# Patient Record
Sex: FEMALE | Race: WHITE | NOT HISPANIC OR LATINO
[De-identification: names, ages, dates, MRNs, and addresses within clinical notes are randomized per-mention and may not be internally consistent; named-entity substitution may affect disease eponyms.]

---

## 2017-01-27 ENCOUNTER — APPOINTMENT (OUTPATIENT)
Dept: PULMONOLOGY | Facility: CLINIC | Age: 67
End: 2017-01-27

## 2017-01-27 VITALS
WEIGHT: 261 LBS | BODY MASS INDEX: 46.25 KG/M2 | RESPIRATION RATE: 17 BRPM | HEART RATE: 82 BPM | DIASTOLIC BLOOD PRESSURE: 80 MMHG | SYSTOLIC BLOOD PRESSURE: 130 MMHG | OXYGEN SATURATION: 95 % | HEIGHT: 63 IN

## 2017-01-27 DIAGNOSIS — K44.9 DIAPHRAGMATIC HERNIA W/OUT OBSTRUCTION OR GANGRENE: ICD-10-CM

## 2017-05-05 ENCOUNTER — APPOINTMENT (OUTPATIENT)
Dept: CT IMAGING | Facility: CLINIC | Age: 67
End: 2017-05-05

## 2017-05-05 ENCOUNTER — OUTPATIENT (OUTPATIENT)
Dept: OUTPATIENT SERVICES | Facility: HOSPITAL | Age: 67
LOS: 1 days | End: 2017-05-05
Payer: COMMERCIAL

## 2017-05-05 DIAGNOSIS — Z98.89 OTHER SPECIFIED POSTPROCEDURAL STATES: Chronic | ICD-10-CM

## 2017-05-05 DIAGNOSIS — Z00.8 ENCOUNTER FOR OTHER GENERAL EXAMINATION: ICD-10-CM

## 2017-05-05 PROCEDURE — 70450 CT HEAD/BRAIN W/O DYE: CPT

## 2017-07-13 ENCOUNTER — APPOINTMENT (OUTPATIENT)
Dept: PULMONOLOGY | Facility: CLINIC | Age: 67
End: 2017-07-13

## 2017-07-13 VITALS
RESPIRATION RATE: 15 BRPM | OXYGEN SATURATION: 93 % | HEIGHT: 63 IN | BODY MASS INDEX: 39.69 KG/M2 | HEART RATE: 65 BPM | SYSTOLIC BLOOD PRESSURE: 127 MMHG | WEIGHT: 224 LBS | DIASTOLIC BLOOD PRESSURE: 80 MMHG

## 2018-01-16 ENCOUNTER — APPOINTMENT (OUTPATIENT)
Dept: PULMONOLOGY | Facility: CLINIC | Age: 68
End: 2018-01-16
Payer: COMMERCIAL

## 2018-01-16 VITALS
DIASTOLIC BLOOD PRESSURE: 70 MMHG | HEART RATE: 70 BPM | SYSTOLIC BLOOD PRESSURE: 110 MMHG | OXYGEN SATURATION: 100 % | WEIGHT: 200 LBS | BODY MASS INDEX: 35.44 KG/M2 | RESPIRATION RATE: 17 BRPM | HEIGHT: 63 IN

## 2018-01-16 PROCEDURE — 99214 OFFICE O/P EST MOD 30 MIN: CPT | Mod: 25

## 2018-01-16 PROCEDURE — 94010 BREATHING CAPACITY TEST: CPT

## 2018-05-18 ENCOUNTER — APPOINTMENT (OUTPATIENT)
Dept: MRI IMAGING | Facility: CLINIC | Age: 68
End: 2018-05-18
Payer: COMMERCIAL

## 2018-05-18 ENCOUNTER — OUTPATIENT (OUTPATIENT)
Dept: OUTPATIENT SERVICES | Facility: HOSPITAL | Age: 68
LOS: 1 days | End: 2018-05-18
Payer: COMMERCIAL

## 2018-05-18 DIAGNOSIS — Z98.89 OTHER SPECIFIED POSTPROCEDURAL STATES: Chronic | ICD-10-CM

## 2018-05-18 DIAGNOSIS — Z00.8 ENCOUNTER FOR OTHER GENERAL EXAMINATION: ICD-10-CM

## 2018-05-18 PROCEDURE — A9585: CPT

## 2018-05-18 PROCEDURE — C8908: CPT

## 2018-05-18 PROCEDURE — 0159T: CPT | Mod: 26

## 2018-05-18 PROCEDURE — 82565 ASSAY OF CREATININE: CPT

## 2018-05-18 PROCEDURE — C8937: CPT

## 2018-05-18 PROCEDURE — 77059 MRI BREAST BILATERAL: CPT | Mod: 26

## 2018-05-22 ENCOUNTER — RESULT REVIEW (OUTPATIENT)
Age: 68
End: 2018-05-22

## 2018-05-22 ENCOUNTER — OUTPATIENT (OUTPATIENT)
Dept: OUTPATIENT SERVICES | Facility: HOSPITAL | Age: 68
LOS: 1 days | End: 2018-05-22
Payer: COMMERCIAL

## 2018-05-22 DIAGNOSIS — Z98.89 OTHER SPECIFIED POSTPROCEDURAL STATES: Chronic | ICD-10-CM

## 2018-05-22 DIAGNOSIS — D05.12 INTRADUCTAL CARCINOMA IN SITU OF LEFT BREAST: ICD-10-CM

## 2018-05-22 PROCEDURE — 88321 CONSLTJ&REPRT SLD PREP ELSWR: CPT

## 2018-05-23 LAB — SURGICAL PATHOLOGY STUDY: SIGNIFICANT CHANGE UP

## 2018-05-31 ENCOUNTER — TRANSCRIPTION ENCOUNTER (OUTPATIENT)
Age: 68
End: 2018-05-31

## 2018-06-06 ENCOUNTER — OUTPATIENT (OUTPATIENT)
Dept: OUTPATIENT SERVICES | Facility: HOSPITAL | Age: 68
LOS: 1 days | End: 2018-06-06
Payer: COMMERCIAL

## 2018-06-06 ENCOUNTER — APPOINTMENT (OUTPATIENT)
Dept: ULTRASOUND IMAGING | Facility: IMAGING CENTER | Age: 68
End: 2018-06-06
Payer: COMMERCIAL

## 2018-06-06 DIAGNOSIS — R92.8 OTHER ABNORMAL AND INCONCLUSIVE FINDINGS ON DIAGNOSTIC IMAGING OF BREAST: ICD-10-CM

## 2018-06-06 DIAGNOSIS — Z98.89 OTHER SPECIFIED POSTPROCEDURAL STATES: Chronic | ICD-10-CM

## 2018-06-06 PROCEDURE — 76642 ULTRASOUND BREAST LIMITED: CPT | Mod: 26,LT

## 2018-06-06 PROCEDURE — C1739: CPT

## 2018-06-06 PROCEDURE — 19285 PERQ DEV BREAST 1ST US IMAG: CPT | Mod: LT

## 2018-06-06 PROCEDURE — 76642 ULTRASOUND BREAST LIMITED: CPT

## 2018-06-06 PROCEDURE — 19285 PERQ DEV BREAST 1ST US IMAG: CPT

## 2018-06-12 ENCOUNTER — RESULT REVIEW (OUTPATIENT)
Age: 68
End: 2018-06-12

## 2018-06-12 ENCOUNTER — APPOINTMENT (OUTPATIENT)
Dept: MAMMOGRAPHY | Facility: IMAGING CENTER | Age: 68
End: 2018-06-12
Payer: COMMERCIAL

## 2018-06-12 ENCOUNTER — APPOINTMENT (OUTPATIENT)
Dept: MRI IMAGING | Facility: IMAGING CENTER | Age: 68
End: 2018-06-12
Payer: COMMERCIAL

## 2018-06-12 ENCOUNTER — OUTPATIENT (OUTPATIENT)
Dept: OUTPATIENT SERVICES | Facility: HOSPITAL | Age: 68
LOS: 1 days | End: 2018-06-12
Payer: COMMERCIAL

## 2018-06-12 DIAGNOSIS — Z98.89 OTHER SPECIFIED POSTPROCEDURAL STATES: Chronic | ICD-10-CM

## 2018-06-12 DIAGNOSIS — R92.8 OTHER ABNORMAL AND INCONCLUSIVE FINDINGS ON DIAGNOSTIC IMAGING OF BREAST: ICD-10-CM

## 2018-06-12 PROCEDURE — 88305 TISSUE EXAM BY PATHOLOGIST: CPT

## 2018-06-12 PROCEDURE — 88305 TISSUE EXAM BY PATHOLOGIST: CPT | Mod: 26

## 2018-06-12 PROCEDURE — 77065 DX MAMMO INCL CAD UNI: CPT

## 2018-06-12 PROCEDURE — C1739: CPT

## 2018-06-12 PROCEDURE — 19085 BX BREAST 1ST LESION MR IMAG: CPT | Mod: LT

## 2018-06-12 PROCEDURE — 77065 DX MAMMO INCL CAD UNI: CPT | Mod: 26,LT

## 2018-06-12 PROCEDURE — 88360 TUMOR IMMUNOHISTOCHEM/MANUAL: CPT | Mod: 26

## 2018-06-12 PROCEDURE — 19281 PERQ DEVICE BREAST 1ST IMAG: CPT | Mod: LT,59

## 2018-06-12 PROCEDURE — A9585: CPT

## 2018-06-12 PROCEDURE — 88360 TUMOR IMMUNOHISTOCHEM/MANUAL: CPT

## 2018-06-12 PROCEDURE — 19085 BX BREAST 1ST LESION MR IMAG: CPT

## 2018-06-12 PROCEDURE — 19281 PERQ DEVICE BREAST 1ST IMAG: CPT

## 2018-06-20 ENCOUNTER — OUTPATIENT (OUTPATIENT)
Dept: OUTPATIENT SERVICES | Facility: HOSPITAL | Age: 68
LOS: 1 days | Discharge: HOME | End: 2018-06-20
Payer: COMMERCIAL

## 2018-06-20 DIAGNOSIS — Z98.89 OTHER SPECIFIED POSTPROCEDURAL STATES: Chronic | ICD-10-CM

## 2018-06-20 DIAGNOSIS — C50.919 MALIGNANT NEOPLASM OF UNSPECIFIED SITE OF UNSPECIFIED FEMALE BREAST: ICD-10-CM

## 2018-06-21 ENCOUNTER — LABORATORY RESULT (OUTPATIENT)
Age: 68
End: 2018-06-21

## 2018-06-21 ENCOUNTER — RESULT REVIEW (OUTPATIENT)
Age: 68
End: 2018-06-21

## 2018-06-21 ENCOUNTER — APPOINTMENT (OUTPATIENT)
Dept: HEMATOLOGY ONCOLOGY | Facility: CLINIC | Age: 68
End: 2018-06-21
Payer: COMMERCIAL

## 2018-06-21 VITALS
DIASTOLIC BLOOD PRESSURE: 81 MMHG | HEIGHT: 61.61 IN | RESPIRATION RATE: 16 BRPM | WEIGHT: 200.62 LBS | HEART RATE: 68 BPM | SYSTOLIC BLOOD PRESSURE: 133 MMHG | BODY MASS INDEX: 37.39 KG/M2 | OXYGEN SATURATION: 100 % | TEMPERATURE: 98.1 F

## 2018-06-21 LAB
HCT VFR BLD CALC: 40 % — SIGNIFICANT CHANGE UP (ref 34.5–45)
HGB BLD-MCNC: 13.9 G/DL — SIGNIFICANT CHANGE UP (ref 11.5–15.5)
MCHC RBC-ENTMCNC: 31.8 PG — SIGNIFICANT CHANGE UP (ref 27–34)
MCHC RBC-ENTMCNC: 34.7 G/DL — SIGNIFICANT CHANGE UP (ref 32–36)
MCV RBC AUTO: 91.4 FL — SIGNIFICANT CHANGE UP (ref 80–100)
PLATELET # BLD AUTO: 259 K/UL — SIGNIFICANT CHANGE UP (ref 150–400)
RBC # BLD: 4.37 M/UL — SIGNIFICANT CHANGE UP (ref 3.8–5.2)
RBC # FLD: 12 % — SIGNIFICANT CHANGE UP (ref 10.3–14.5)
WBC # BLD: 6 K/UL — SIGNIFICANT CHANGE UP (ref 3.8–10.5)
WBC # FLD AUTO: 6 K/UL — SIGNIFICANT CHANGE UP (ref 3.8–10.5)

## 2018-06-21 PROCEDURE — 99245 OFF/OP CONSLTJ NEW/EST HI 55: CPT

## 2018-06-27 ENCOUNTER — OUTPATIENT (OUTPATIENT)
Dept: OUTPATIENT SERVICES | Facility: HOSPITAL | Age: 68
LOS: 1 days | End: 2018-06-27
Payer: COMMERCIAL

## 2018-06-27 ENCOUNTER — APPOINTMENT (OUTPATIENT)
Dept: CV DIAGNOSITCS | Facility: HOSPITAL | Age: 68
End: 2018-06-27

## 2018-06-27 DIAGNOSIS — I25.10 ATHEROSCLEROTIC HEART DISEASE OF NATIVE CORONARY ARTERY WITHOUT ANGINA PECTORIS: ICD-10-CM

## 2018-06-27 DIAGNOSIS — Z98.89 OTHER SPECIFIED POSTPROCEDURAL STATES: Chronic | ICD-10-CM

## 2018-06-27 PROCEDURE — 93306 TTE W/DOPPLER COMPLETE: CPT | Mod: 26

## 2018-06-27 PROCEDURE — 93306 TTE W/DOPPLER COMPLETE: CPT

## 2018-06-27 PROCEDURE — 0399T: CPT

## 2018-06-28 ENCOUNTER — MED ADMIN CHARGE (OUTPATIENT)
Age: 68
End: 2018-06-28

## 2018-07-03 ENCOUNTER — OUTPATIENT (OUTPATIENT)
Dept: OUTPATIENT SERVICES | Facility: HOSPITAL | Age: 68
LOS: 1 days | End: 2018-07-03
Payer: COMMERCIAL

## 2018-07-03 VITALS
TEMPERATURE: 98 F | WEIGHT: 199.96 LBS | HEIGHT: 62 IN | OXYGEN SATURATION: 97 % | DIASTOLIC BLOOD PRESSURE: 78 MMHG | RESPIRATION RATE: 15 BRPM | SYSTOLIC BLOOD PRESSURE: 126 MMHG | HEART RATE: 68 BPM

## 2018-07-03 DIAGNOSIS — Z98.89 OTHER SPECIFIED POSTPROCEDURAL STATES: Chronic | ICD-10-CM

## 2018-07-03 DIAGNOSIS — C50.919 MALIGNANT NEOPLASM OF UNSPECIFIED SITE OF UNSPECIFIED FEMALE BREAST: ICD-10-CM

## 2018-07-03 DIAGNOSIS — Z90.49 ACQUIRED ABSENCE OF OTHER SPECIFIED PARTS OF DIGESTIVE TRACT: Chronic | ICD-10-CM

## 2018-07-03 DIAGNOSIS — Z90.3 ACQUIRED ABSENCE OF STOMACH [PART OF]: Chronic | ICD-10-CM

## 2018-07-03 DIAGNOSIS — Z98.890 OTHER SPECIFIED POSTPROCEDURAL STATES: Chronic | ICD-10-CM

## 2018-07-03 DIAGNOSIS — Z01.818 ENCOUNTER FOR OTHER PREPROCEDURAL EXAMINATION: ICD-10-CM

## 2018-07-03 RX ORDER — LIDOCAINE HCL 20 MG/ML
0.2 VIAL (ML) INJECTION ONCE
Qty: 0 | Refills: 0 | Status: DISCONTINUED | OUTPATIENT
Start: 2018-07-11 | End: 2018-07-26

## 2018-07-03 RX ORDER — SODIUM CHLORIDE 9 MG/ML
3 INJECTION INTRAMUSCULAR; INTRAVENOUS; SUBCUTANEOUS EVERY 8 HOURS
Qty: 0 | Refills: 0 | Status: DISCONTINUED | OUTPATIENT
Start: 2018-07-11 | End: 2018-07-26

## 2018-07-03 RX ORDER — ACETAMINOPHEN 500 MG
1000 TABLET ORAL ONCE
Qty: 0 | Refills: 0 | Status: COMPLETED | OUTPATIENT
Start: 2018-07-11 | End: 2018-07-11

## 2018-07-03 NOTE — H&P PST ADULT - HISTORY OF PRESENT ILLNESS
68 y/o female with PMHx of 66 y/o female with PMHx of obesity (s/p gastric sleeve 2014), GERD (diet controlled) had left breast pain and while palpating her breast felt a lump. s/p left breast biopsy 4/2018 revealed a malignant neoplasm per pt. Presents to PST for a scheduled Mediport insertion on 7/11/2018. 68 y/o female with PMHx of obesity (s/p gastric sleeve 2014), GERD (diet controlled) had left breast pain, while palpating her breast she felt a lump. s/p left breast biopsy 4/2018 revealed a malignant neoplasm per pt. Presents to PST for a scheduled Mediport insertion on 7/11/2018.

## 2018-07-03 NOTE — H&P PST ADULT - PMH
Esophagitis    GERD (gastroesophageal reflux disease)    Hiatal hernia    Migraine    Morbid obesity with BMI of 40.0-44.9, adult    MVP (mitral valve prolapse)  recent echo 5/14/14  Shingles outbreak  left orbital area 5/7/14 Esophagitis    GERD (gastroesophageal reflux disease)    Hiatal hernia    Malignant neoplasm of breast    Migraine    Morbid obesity with BMI of 40.0-44.9, adult    MVP (mitral valve prolapse)  recent echo 5/14/14  Shingles outbreak  left orbital area 5/7/14 Esophagitis    GERD (gastroesophageal reflux disease)  diet-controlled  Hiatal hernia    Malignant neoplasm of breast  left, diagnosed 4/2018  Migraine    Morbid obesity with BMI of 40.0-44.9, adult  s/p sleeve gastrectomy 2014  MVP (mitral valve prolapse)  recent echo 6/27/18  Shingles outbreak  left orbital area 5/7/14

## 2018-07-03 NOTE — H&P PST ADULT - PROBLEM SELECTOR PLAN 1
Scheduled mediport insertion on 7/11/2018  CBC reviewed and in [facility]  Pre-op instructions given to pt including chlorhexidine soap

## 2018-07-03 NOTE — H&P PST ADULT - NSANTHOSAYNRD_GEN_A_CORE
No. XOCHITL screening performed.  STOP BANG Legend: 0-2 = LOW Risk; 3-4 = INTERMEDIATE Risk; 5-8 = HIGH Risk

## 2018-07-03 NOTE — H&P PST ADULT - OTHER CARE PROVIDERS
nahomi campoverde   Lorne Tan  ([facility]) [phone]//Андрей Rashid ([facility]) [phone]

## 2018-07-03 NOTE — H&P PST ADULT - ACTIVITY
walks 0.5 miles 3x week walks 0.5 miles 3x week, stationary bicycle, grocery shopping, climbs steps daily, able to participate in strenuous activity walks 3x week, stationary bicycle, grocery shopping, climbs steps daily, able to participate in strenuous activity

## 2018-07-03 NOTE — H&P PST ADULT - PSH
H/O hernia repair  2014  S/P appendectomy    S/P   x3 H/O hernia repair  Tamiko, Cristal  History of sleeve gastrectomy  2014  S/P appendectomy  1967  S/P   x3 (, , )  S/P laparoscopic cholecystectomy  3/8/18

## 2018-07-10 ENCOUNTER — TRANSCRIPTION ENCOUNTER (OUTPATIENT)
Age: 68
End: 2018-07-10

## 2018-07-11 ENCOUNTER — OUTPATIENT (OUTPATIENT)
Dept: OUTPATIENT SERVICES | Facility: HOSPITAL | Age: 68
LOS: 1 days | End: 2018-07-11
Payer: COMMERCIAL

## 2018-07-11 VITALS
OXYGEN SATURATION: 96 % | DIASTOLIC BLOOD PRESSURE: 60 MMHG | SYSTOLIC BLOOD PRESSURE: 125 MMHG | TEMPERATURE: 98 F | RESPIRATION RATE: 16 BRPM | HEART RATE: 56 BPM

## 2018-07-11 VITALS
OXYGEN SATURATION: 96 % | HEIGHT: 62 IN | WEIGHT: 199.96 LBS | DIASTOLIC BLOOD PRESSURE: 83 MMHG | HEART RATE: 69 BPM | SYSTOLIC BLOOD PRESSURE: 155 MMHG | TEMPERATURE: 98 F | RESPIRATION RATE: 18 BRPM

## 2018-07-11 DIAGNOSIS — Z98.890 OTHER SPECIFIED POSTPROCEDURAL STATES: Chronic | ICD-10-CM

## 2018-07-11 DIAGNOSIS — Z98.89 OTHER SPECIFIED POSTPROCEDURAL STATES: Chronic | ICD-10-CM

## 2018-07-11 DIAGNOSIS — Z90.49 ACQUIRED ABSENCE OF OTHER SPECIFIED PARTS OF DIGESTIVE TRACT: Chronic | ICD-10-CM

## 2018-07-11 DIAGNOSIS — Z90.3 ACQUIRED ABSENCE OF STOMACH [PART OF]: Chronic | ICD-10-CM

## 2018-07-11 DIAGNOSIS — C50.919 MALIGNANT NEOPLASM OF UNSPECIFIED SITE OF UNSPECIFIED FEMALE BREAST: ICD-10-CM

## 2018-07-11 PROCEDURE — G0463: CPT

## 2018-07-11 PROCEDURE — C1788: CPT

## 2018-07-11 PROCEDURE — 71045 X-RAY EXAM CHEST 1 VIEW: CPT

## 2018-07-11 PROCEDURE — 71045 X-RAY EXAM CHEST 1 VIEW: CPT | Mod: 26

## 2018-07-11 PROCEDURE — 36561 INSERT TUNNELED CV CATH: CPT

## 2018-07-11 PROCEDURE — 76000 FLUOROSCOPY <1 HR PHYS/QHP: CPT

## 2018-07-11 RX ORDER — CELECOXIB 200 MG/1
200 CAPSULE ORAL ONCE
Qty: 0 | Refills: 0 | Status: DISCONTINUED | OUTPATIENT
Start: 2018-07-11 | End: 2018-07-26

## 2018-07-11 RX ORDER — FAMOTIDINE 10 MG/ML
20 INJECTION INTRAVENOUS ONCE
Qty: 0 | Refills: 0 | Status: COMPLETED | OUTPATIENT
Start: 2018-07-11 | End: 2018-07-11

## 2018-07-11 RX ORDER — CELECOXIB 200 MG/1
200 CAPSULE ORAL ONCE
Qty: 0 | Refills: 0 | Status: COMPLETED | OUTPATIENT
Start: 2018-07-11 | End: 2018-07-11

## 2018-07-11 RX ORDER — SODIUM CHLORIDE 9 MG/ML
1000 INJECTION, SOLUTION INTRAVENOUS
Qty: 0 | Refills: 0 | Status: DISCONTINUED | OUTPATIENT
Start: 2018-07-11 | End: 2018-07-26

## 2018-07-11 RX ORDER — OXYCODONE HYDROCHLORIDE 5 MG/1
10 TABLET ORAL ONCE
Qty: 0 | Refills: 0 | Status: DISCONTINUED | OUTPATIENT
Start: 2018-07-11 | End: 2018-07-11

## 2018-07-11 RX ORDER — ONDANSETRON 8 MG/1
4 TABLET, FILM COATED ORAL ONCE
Qty: 0 | Refills: 0 | Status: DISCONTINUED | OUTPATIENT
Start: 2018-07-11 | End: 2018-07-26

## 2018-07-11 RX ORDER — OXYCODONE HYDROCHLORIDE 5 MG/1
5 TABLET ORAL ONCE
Qty: 0 | Refills: 0 | Status: DISCONTINUED | OUTPATIENT
Start: 2018-07-11 | End: 2018-07-11

## 2018-07-11 RX ADMIN — CELECOXIB 200 MILLIGRAM(S): 200 CAPSULE ORAL at 11:00

## 2018-07-11 RX ADMIN — Medication 1000 MILLIGRAM(S): at 11:00

## 2018-07-11 RX ADMIN — FAMOTIDINE 20 MILLIGRAM(S): 10 INJECTION INTRAVENOUS at 13:26

## 2018-07-11 NOTE — ASU PATIENT PROFILE, ADULT - PMH
Esophagitis    GERD (gastroesophageal reflux disease)  diet-controlled  Hiatal hernia    Malignant neoplasm of breast  left, diagnosed 4/2018  Migraine    Morbid obesity with BMI of 40.0-44.9, adult  s/p sleeve gastrectomy 2014  MVP (mitral valve prolapse)  recent echo 6/27/18  Shingles outbreak  left orbital area 5/7/14

## 2018-07-12 ENCOUNTER — APPOINTMENT (OUTPATIENT)
Dept: INFUSION THERAPY | Facility: HOSPITAL | Age: 68
End: 2018-07-12

## 2018-07-12 ENCOUNTER — RESULT REVIEW (OUTPATIENT)
Age: 68
End: 2018-07-12

## 2018-07-12 ENCOUNTER — APPOINTMENT (OUTPATIENT)
Dept: HEMATOLOGY ONCOLOGY | Facility: CLINIC | Age: 68
End: 2018-07-12
Payer: COMMERCIAL

## 2018-07-12 LAB
HCT VFR BLD CALC: 37.7 % — SIGNIFICANT CHANGE UP (ref 34.5–45)
HGB BLD-MCNC: 12.6 G/DL — SIGNIFICANT CHANGE UP (ref 11.5–15.5)
MCHC RBC-ENTMCNC: 30.3 PG — SIGNIFICANT CHANGE UP (ref 27–34)
MCHC RBC-ENTMCNC: 33.5 G/DL — SIGNIFICANT CHANGE UP (ref 32–36)
MCV RBC AUTO: 90.4 FL — SIGNIFICANT CHANGE UP (ref 80–100)
PLATELET # BLD AUTO: 266 K/UL — SIGNIFICANT CHANGE UP (ref 150–400)
RBC # BLD: 4.17 M/UL — SIGNIFICANT CHANGE UP (ref 3.8–5.2)
RBC # FLD: 11.9 % — SIGNIFICANT CHANGE UP (ref 10.3–14.5)
WBC # BLD: 11.6 K/UL — HIGH (ref 3.8–10.5)
WBC # FLD AUTO: 11.6 K/UL — HIGH (ref 3.8–10.5)

## 2018-07-12 PROCEDURE — 99213 OFFICE O/P EST LOW 20 MIN: CPT

## 2018-07-12 PROCEDURE — 93010 ELECTROCARDIOGRAM REPORT: CPT

## 2018-07-13 DIAGNOSIS — Z51.11 ENCOUNTER FOR ANTINEOPLASTIC CHEMOTHERAPY: ICD-10-CM

## 2018-07-13 DIAGNOSIS — Z51.89 ENCOUNTER FOR OTHER SPECIFIED AFTERCARE: ICD-10-CM

## 2018-07-13 DIAGNOSIS — R11.2 NAUSEA WITH VOMITING, UNSPECIFIED: ICD-10-CM

## 2018-07-16 ENCOUNTER — NON-APPOINTMENT (OUTPATIENT)
Age: 68
End: 2018-07-16

## 2018-07-16 ENCOUNTER — APPOINTMENT (OUTPATIENT)
Dept: PULMONOLOGY | Facility: CLINIC | Age: 68
End: 2018-07-16
Payer: COMMERCIAL

## 2018-07-16 VITALS
HEIGHT: 62 IN | BODY MASS INDEX: 36.8 KG/M2 | DIASTOLIC BLOOD PRESSURE: 70 MMHG | SYSTOLIC BLOOD PRESSURE: 120 MMHG | WEIGHT: 200 LBS | OXYGEN SATURATION: 96 % | RESPIRATION RATE: 17 BRPM | HEART RATE: 59 BPM

## 2018-07-16 PROCEDURE — 99214 OFFICE O/P EST MOD 30 MIN: CPT | Mod: 25

## 2018-07-16 PROCEDURE — 94010 BREATHING CAPACITY TEST: CPT

## 2018-07-17 PROBLEM — C50.919 MALIGNANT NEOPLASM OF UNSPECIFIED SITE OF UNSPECIFIED FEMALE BREAST: Chronic | Status: ACTIVE | Noted: 2018-07-03

## 2018-07-19 ENCOUNTER — OUTPATIENT (OUTPATIENT)
Dept: OUTPATIENT SERVICES | Facility: HOSPITAL | Age: 68
LOS: 1 days | Discharge: HOME | End: 2018-07-19

## 2018-07-19 ENCOUNTER — FORM ENCOUNTER (OUTPATIENT)
Age: 68
End: 2018-07-19

## 2018-07-19 DIAGNOSIS — C50.919 MALIGNANT NEOPLASM OF UNSPECIFIED SITE OF UNSPECIFIED FEMALE BREAST: ICD-10-CM

## 2018-07-19 DIAGNOSIS — Z98.89 OTHER SPECIFIED POSTPROCEDURAL STATES: Chronic | ICD-10-CM

## 2018-07-19 DIAGNOSIS — Z98.890 OTHER SPECIFIED POSTPROCEDURAL STATES: Chronic | ICD-10-CM

## 2018-07-19 DIAGNOSIS — Z90.3 ACQUIRED ABSENCE OF STOMACH [PART OF]: Chronic | ICD-10-CM

## 2018-07-19 DIAGNOSIS — Z90.49 ACQUIRED ABSENCE OF OTHER SPECIFIED PARTS OF DIGESTIVE TRACT: Chronic | ICD-10-CM

## 2018-07-20 ENCOUNTER — OUTPATIENT (OUTPATIENT)
Dept: OUTPATIENT SERVICES | Facility: HOSPITAL | Age: 68
LOS: 1 days | End: 2018-07-20
Payer: COMMERCIAL

## 2018-07-20 ENCOUNTER — APPOINTMENT (OUTPATIENT)
Dept: ULTRASOUND IMAGING | Facility: IMAGING CENTER | Age: 68
End: 2018-07-20
Payer: COMMERCIAL

## 2018-07-20 DIAGNOSIS — C50.912 MALIGNANT NEOPLASM OF UNSPECIFIED SITE OF LEFT FEMALE BREAST: ICD-10-CM

## 2018-07-20 DIAGNOSIS — Z90.3 ACQUIRED ABSENCE OF STOMACH [PART OF]: Chronic | ICD-10-CM

## 2018-07-20 DIAGNOSIS — Z90.49 ACQUIRED ABSENCE OF OTHER SPECIFIED PARTS OF DIGESTIVE TRACT: Chronic | ICD-10-CM

## 2018-07-20 DIAGNOSIS — Z98.890 OTHER SPECIFIED POSTPROCEDURAL STATES: Chronic | ICD-10-CM

## 2018-07-20 DIAGNOSIS — Z98.89 OTHER SPECIFIED POSTPROCEDURAL STATES: Chronic | ICD-10-CM

## 2018-07-20 PROCEDURE — 76641 ULTRASOUND BREAST COMPLETE: CPT

## 2018-07-20 PROCEDURE — 76641 ULTRASOUND BREAST COMPLETE: CPT | Mod: 26,LT

## 2018-07-26 ENCOUNTER — APPOINTMENT (OUTPATIENT)
Dept: HEMATOLOGY ONCOLOGY | Facility: CLINIC | Age: 68
End: 2018-07-26
Payer: COMMERCIAL

## 2018-07-26 ENCOUNTER — APPOINTMENT (OUTPATIENT)
Dept: INFUSION THERAPY | Facility: HOSPITAL | Age: 68
End: 2018-07-26

## 2018-07-26 ENCOUNTER — RESULT REVIEW (OUTPATIENT)
Age: 68
End: 2018-07-26

## 2018-07-26 ENCOUNTER — LABORATORY RESULT (OUTPATIENT)
Age: 68
End: 2018-07-26

## 2018-07-26 VITALS
WEIGHT: 201.72 LBS | RESPIRATION RATE: 16 BRPM | SYSTOLIC BLOOD PRESSURE: 120 MMHG | DIASTOLIC BLOOD PRESSURE: 72 MMHG | HEART RATE: 74 BPM | TEMPERATURE: 98.2 F | OXYGEN SATURATION: 98 % | BODY MASS INDEX: 36.9 KG/M2

## 2018-07-26 DIAGNOSIS — K59.00 CONSTIPATION, UNSPECIFIED: ICD-10-CM

## 2018-07-26 LAB
HCT VFR BLD CALC: 36.4 % — SIGNIFICANT CHANGE UP (ref 34.5–45)
HGB BLD-MCNC: 12.2 G/DL — SIGNIFICANT CHANGE UP (ref 11.5–15.5)
MCHC RBC-ENTMCNC: 30.9 PG — SIGNIFICANT CHANGE UP (ref 27–34)
MCHC RBC-ENTMCNC: 33.6 G/DL — SIGNIFICANT CHANGE UP (ref 32–36)
MCV RBC AUTO: 91.9 FL — SIGNIFICANT CHANGE UP (ref 80–100)
PLATELET # BLD AUTO: 230 K/UL — SIGNIFICANT CHANGE UP (ref 150–400)
RBC # BLD: 3.96 M/UL — SIGNIFICANT CHANGE UP (ref 3.8–5.2)
RBC # FLD: 12.3 % — SIGNIFICANT CHANGE UP (ref 10.3–14.5)
WBC # BLD: 10.8 K/UL — HIGH (ref 3.8–10.5)
WBC # FLD AUTO: 10.8 K/UL — HIGH (ref 3.8–10.5)

## 2018-07-26 PROCEDURE — 99214 OFFICE O/P EST MOD 30 MIN: CPT

## 2018-07-27 DIAGNOSIS — Z51.11 ENCOUNTER FOR ANTINEOPLASTIC CHEMOTHERAPY: ICD-10-CM

## 2018-07-27 DIAGNOSIS — Z51.89 ENCOUNTER FOR OTHER SPECIFIED AFTERCARE: ICD-10-CM

## 2018-07-27 DIAGNOSIS — R11.2 NAUSEA WITH VOMITING, UNSPECIFIED: ICD-10-CM

## 2018-08-09 ENCOUNTER — LABORATORY RESULT (OUTPATIENT)
Age: 68
End: 2018-08-09

## 2018-08-09 ENCOUNTER — APPOINTMENT (OUTPATIENT)
Dept: HEMATOLOGY ONCOLOGY | Facility: CLINIC | Age: 68
End: 2018-08-09
Payer: COMMERCIAL

## 2018-08-09 ENCOUNTER — APPOINTMENT (OUTPATIENT)
Dept: INFUSION THERAPY | Facility: HOSPITAL | Age: 68
End: 2018-08-09

## 2018-08-09 ENCOUNTER — RESULT REVIEW (OUTPATIENT)
Age: 68
End: 2018-08-09

## 2018-08-09 VITALS
OXYGEN SATURATION: 96 % | DIASTOLIC BLOOD PRESSURE: 74 MMHG | RESPIRATION RATE: 16 BRPM | WEIGHT: 203.93 LBS | BODY MASS INDEX: 37.3 KG/M2 | HEART RATE: 80 BPM | SYSTOLIC BLOOD PRESSURE: 120 MMHG | TEMPERATURE: 97.8 F

## 2018-08-09 LAB
HCT VFR BLD CALC: 32.8 % — LOW (ref 34.5–45)
HGB BLD-MCNC: 10.8 G/DL — LOW (ref 11.5–15.5)
MCHC RBC-ENTMCNC: 30.1 PG — SIGNIFICANT CHANGE UP (ref 27–34)
MCHC RBC-ENTMCNC: 33.1 G/DL — SIGNIFICANT CHANGE UP (ref 32–36)
MCV RBC AUTO: 90.9 FL — SIGNIFICANT CHANGE UP (ref 80–100)
PLATELET # BLD AUTO: 382 K/UL — SIGNIFICANT CHANGE UP (ref 150–400)
RBC # BLD: 3.6 M/UL — LOW (ref 3.8–5.2)
RBC # FLD: 12.6 % — SIGNIFICANT CHANGE UP (ref 10.3–14.5)
WBC # BLD: 10.3 K/UL — SIGNIFICANT CHANGE UP (ref 3.8–10.5)
WBC # FLD AUTO: 10.3 K/UL — SIGNIFICANT CHANGE UP (ref 3.8–10.5)

## 2018-08-09 PROCEDURE — 99215 OFFICE O/P EST HI 40 MIN: CPT

## 2018-08-09 RX ORDER — AMOXICILLIN 500 MG/1
500 CAPSULE ORAL
Qty: 21 | Refills: 0 | Status: DISCONTINUED | COMMUNITY
Start: 2018-08-02

## 2018-08-09 RX ORDER — OXYCODONE AND ACETAMINOPHEN 5; 325 MG/1; MG/1
5-325 TABLET ORAL
Qty: 14 | Refills: 0 | Status: DISCONTINUED | COMMUNITY
Start: 2018-03-08

## 2018-08-15 ENCOUNTER — OUTPATIENT (OUTPATIENT)
Dept: OUTPATIENT SERVICES | Facility: HOSPITAL | Age: 68
LOS: 1 days | Discharge: HOME | End: 2018-08-15

## 2018-08-15 DIAGNOSIS — Z98.89 OTHER SPECIFIED POSTPROCEDURAL STATES: Chronic | ICD-10-CM

## 2018-08-15 DIAGNOSIS — Z90.49 ACQUIRED ABSENCE OF OTHER SPECIFIED PARTS OF DIGESTIVE TRACT: Chronic | ICD-10-CM

## 2018-08-15 DIAGNOSIS — C50.919 MALIGNANT NEOPLASM OF UNSPECIFIED SITE OF UNSPECIFIED FEMALE BREAST: ICD-10-CM

## 2018-08-15 DIAGNOSIS — Z90.3 ACQUIRED ABSENCE OF STOMACH [PART OF]: Chronic | ICD-10-CM

## 2018-08-15 DIAGNOSIS — Z98.890 OTHER SPECIFIED POSTPROCEDURAL STATES: Chronic | ICD-10-CM

## 2018-08-23 ENCOUNTER — LABORATORY RESULT (OUTPATIENT)
Age: 68
End: 2018-08-23

## 2018-08-23 ENCOUNTER — APPOINTMENT (OUTPATIENT)
Dept: HEMATOLOGY ONCOLOGY | Facility: CLINIC | Age: 68
End: 2018-08-23
Payer: COMMERCIAL

## 2018-08-23 ENCOUNTER — APPOINTMENT (OUTPATIENT)
Dept: INFUSION THERAPY | Facility: HOSPITAL | Age: 68
End: 2018-08-23

## 2018-08-23 ENCOUNTER — RESULT REVIEW (OUTPATIENT)
Age: 68
End: 2018-08-23

## 2018-08-23 VITALS
BODY MASS INDEX: 36.29 KG/M2 | RESPIRATION RATE: 16 BRPM | DIASTOLIC BLOOD PRESSURE: 68 MMHG | WEIGHT: 198.42 LBS | TEMPERATURE: 97.9 F | OXYGEN SATURATION: 95 % | HEART RATE: 85 BPM | SYSTOLIC BLOOD PRESSURE: 96 MMHG

## 2018-08-23 LAB
HCT VFR BLD CALC: 34.3 % — LOW (ref 34.5–45)
HGB BLD-MCNC: 11.6 G/DL — SIGNIFICANT CHANGE UP (ref 11.5–15.5)
MCHC RBC-ENTMCNC: 31 PG — SIGNIFICANT CHANGE UP (ref 27–34)
MCHC RBC-ENTMCNC: 33.8 G/DL — SIGNIFICANT CHANGE UP (ref 32–36)
MCV RBC AUTO: 91.9 FL — SIGNIFICANT CHANGE UP (ref 80–100)
PLATELET # BLD AUTO: 335 K/UL — SIGNIFICANT CHANGE UP (ref 150–400)
RBC # BLD: 3.73 M/UL — LOW (ref 3.8–5.2)
RBC # FLD: 13.9 % — SIGNIFICANT CHANGE UP (ref 10.3–14.5)
WBC # BLD: 13.1 K/UL — HIGH (ref 3.8–10.5)
WBC # FLD AUTO: 13.1 K/UL — HIGH (ref 3.8–10.5)

## 2018-08-23 PROCEDURE — 99214 OFFICE O/P EST MOD 30 MIN: CPT

## 2018-08-24 DIAGNOSIS — Z51.11 ENCOUNTER FOR ANTINEOPLASTIC CHEMOTHERAPY: ICD-10-CM

## 2018-08-24 DIAGNOSIS — R11.2 NAUSEA WITH VOMITING, UNSPECIFIED: ICD-10-CM

## 2018-08-24 DIAGNOSIS — Z51.89 ENCOUNTER FOR OTHER SPECIFIED AFTERCARE: ICD-10-CM

## 2018-09-05 ENCOUNTER — FORM ENCOUNTER (OUTPATIENT)
Age: 68
End: 2018-09-05

## 2018-09-06 ENCOUNTER — APPOINTMENT (OUTPATIENT)
Dept: INFUSION THERAPY | Facility: HOSPITAL | Age: 68
End: 2018-09-06

## 2018-09-06 ENCOUNTER — RESULT REVIEW (OUTPATIENT)
Age: 68
End: 2018-09-06

## 2018-09-06 ENCOUNTER — APPOINTMENT (OUTPATIENT)
Dept: ULTRASOUND IMAGING | Facility: IMAGING CENTER | Age: 68
End: 2018-09-06
Payer: COMMERCIAL

## 2018-09-06 ENCOUNTER — OUTPATIENT (OUTPATIENT)
Dept: OUTPATIENT SERVICES | Facility: HOSPITAL | Age: 68
LOS: 1 days | End: 2018-09-06
Payer: COMMERCIAL

## 2018-09-06 DIAGNOSIS — Z98.89 OTHER SPECIFIED POSTPROCEDURAL STATES: Chronic | ICD-10-CM

## 2018-09-06 DIAGNOSIS — Z98.890 OTHER SPECIFIED POSTPROCEDURAL STATES: Chronic | ICD-10-CM

## 2018-09-06 DIAGNOSIS — Z00.8 ENCOUNTER FOR OTHER GENERAL EXAMINATION: ICD-10-CM

## 2018-09-06 DIAGNOSIS — Z90.3 ACQUIRED ABSENCE OF STOMACH [PART OF]: Chronic | ICD-10-CM

## 2018-09-06 DIAGNOSIS — Z90.49 ACQUIRED ABSENCE OF OTHER SPECIFIED PARTS OF DIGESTIVE TRACT: Chronic | ICD-10-CM

## 2018-09-06 LAB
HCT VFR BLD CALC: 30.1 % — LOW (ref 34.5–45)
HGB BLD-MCNC: 10.2 G/DL — LOW (ref 11.5–15.5)
MCHC RBC-ENTMCNC: 31.4 PG — SIGNIFICANT CHANGE UP (ref 27–34)
MCHC RBC-ENTMCNC: 33.7 G/DL — SIGNIFICANT CHANGE UP (ref 32–36)
MCV RBC AUTO: 93.2 FL — SIGNIFICANT CHANGE UP (ref 80–100)
PLATELET # BLD AUTO: 429 K/UL — HIGH (ref 150–400)
RBC # BLD: 3.23 M/UL — LOW (ref 3.8–5.2)
RBC # FLD: 14.2 % — SIGNIFICANT CHANGE UP (ref 10.3–14.5)
WBC # BLD: 12.1 K/UL — HIGH (ref 3.8–10.5)
WBC # FLD AUTO: 12.1 K/UL — HIGH (ref 3.8–10.5)

## 2018-09-06 PROCEDURE — 76641 ULTRASOUND BREAST COMPLETE: CPT | Mod: 26,50

## 2018-09-06 PROCEDURE — 76641 ULTRASOUND BREAST COMPLETE: CPT

## 2018-09-10 RX ORDER — APREPITANT 80 MG/1
80 CAPSULE ORAL DAILY
Qty: 2 | Refills: 3 | Status: DISCONTINUED | COMMUNITY
Start: 2018-06-21 | End: 2018-09-10

## 2018-09-10 RX ORDER — DEXAMETHASONE 4 MG/1
4 TABLET ORAL
Qty: 10 | Refills: 0 | Status: DISCONTINUED | COMMUNITY
Start: 2018-08-23 | End: 2018-09-10

## 2018-09-10 RX ORDER — DEXAMETHASONE 4 MG/1
4 TABLET ORAL DAILY
Qty: 6 | Refills: 3 | Status: DISCONTINUED | COMMUNITY
Start: 2018-06-21 | End: 2018-09-10

## 2018-09-12 ENCOUNTER — APPOINTMENT (OUTPATIENT)
Dept: HEMATOLOGY ONCOLOGY | Facility: CLINIC | Age: 68
End: 2018-09-12
Payer: COMMERCIAL

## 2018-09-12 ENCOUNTER — NON-APPOINTMENT (OUTPATIENT)
Age: 68
End: 2018-09-12

## 2018-09-12 ENCOUNTER — APPOINTMENT (OUTPATIENT)
Dept: INFUSION THERAPY | Facility: HOSPITAL | Age: 68
End: 2018-09-12

## 2018-09-12 ENCOUNTER — LABORATORY RESULT (OUTPATIENT)
Age: 68
End: 2018-09-12

## 2018-09-12 ENCOUNTER — RESULT REVIEW (OUTPATIENT)
Age: 68
End: 2018-09-12

## 2018-09-12 VITALS
OXYGEN SATURATION: 99 % | BODY MASS INDEX: 36.9 KG/M2 | WEIGHT: 201.72 LBS | HEART RATE: 92 BPM | TEMPERATURE: 98.4 F | DIASTOLIC BLOOD PRESSURE: 97 MMHG | SYSTOLIC BLOOD PRESSURE: 126 MMHG | RESPIRATION RATE: 16 BRPM

## 2018-09-12 LAB
HCT VFR BLD CALC: 28.2 % — LOW (ref 34.5–45)
HGB BLD-MCNC: 9.4 G/DL — LOW (ref 11.5–15.5)
MCHC RBC-ENTMCNC: 30.9 PG — SIGNIFICANT CHANGE UP (ref 27–34)
MCHC RBC-ENTMCNC: 33.2 G/DL — SIGNIFICANT CHANGE UP (ref 32–36)
MCV RBC AUTO: 93.2 FL — SIGNIFICANT CHANGE UP (ref 80–100)
PLATELET # BLD AUTO: 427 K/UL — HIGH (ref 150–400)
RBC # BLD: 3.02 M/UL — LOW (ref 3.8–5.2)
RBC # FLD: 13.8 % — SIGNIFICANT CHANGE UP (ref 10.3–14.5)
WBC # BLD: 4.6 K/UL — SIGNIFICANT CHANGE UP (ref 3.8–10.5)
WBC # FLD AUTO: 4.6 K/UL — SIGNIFICANT CHANGE UP (ref 3.8–10.5)

## 2018-09-12 PROCEDURE — 99214 OFFICE O/P EST MOD 30 MIN: CPT

## 2018-09-12 RX ORDER — AMOXICILLIN 500 MG/1
500 TABLET, FILM COATED ORAL
Qty: 21 | Refills: 0 | Status: DISCONTINUED | COMMUNITY
Start: 2018-08-22 | End: 2018-09-12

## 2018-09-13 ENCOUNTER — OUTPATIENT (OUTPATIENT)
Dept: OUTPATIENT SERVICES | Facility: HOSPITAL | Age: 68
LOS: 1 days | Discharge: HOME | End: 2018-09-13

## 2018-09-13 DIAGNOSIS — Z90.49 ACQUIRED ABSENCE OF OTHER SPECIFIED PARTS OF DIGESTIVE TRACT: Chronic | ICD-10-CM

## 2018-09-13 DIAGNOSIS — Z90.3 ACQUIRED ABSENCE OF STOMACH [PART OF]: Chronic | ICD-10-CM

## 2018-09-13 DIAGNOSIS — Z98.89 OTHER SPECIFIED POSTPROCEDURAL STATES: Chronic | ICD-10-CM

## 2018-09-13 DIAGNOSIS — Z98.890 OTHER SPECIFIED POSTPROCEDURAL STATES: Chronic | ICD-10-CM

## 2018-09-13 DIAGNOSIS — C50.919 MALIGNANT NEOPLASM OF UNSPECIFIED SITE OF UNSPECIFIED FEMALE BREAST: ICD-10-CM

## 2018-09-17 ENCOUNTER — EMERGENCY (EMERGENCY)
Facility: HOSPITAL | Age: 68
LOS: 1 days | Discharge: HOME | End: 2018-09-17
Attending: EMERGENCY MEDICINE | Admitting: EMERGENCY MEDICINE
Payer: COMMERCIAL

## 2018-09-17 VITALS
HEART RATE: 95 BPM | RESPIRATION RATE: 18 BRPM | SYSTOLIC BLOOD PRESSURE: 129 MMHG | DIASTOLIC BLOOD PRESSURE: 62 MMHG | TEMPERATURE: 98 F | OXYGEN SATURATION: 98 %

## 2018-09-17 VITALS
TEMPERATURE: 99 F | HEART RATE: 77 BPM | SYSTOLIC BLOOD PRESSURE: 114 MMHG | RESPIRATION RATE: 18 BRPM | OXYGEN SATURATION: 98 % | DIASTOLIC BLOOD PRESSURE: 57 MMHG

## 2018-09-17 DIAGNOSIS — Z90.3 ACQUIRED ABSENCE OF STOMACH [PART OF]: Chronic | ICD-10-CM

## 2018-09-17 DIAGNOSIS — Z98.89 OTHER SPECIFIED POSTPROCEDURAL STATES: Chronic | ICD-10-CM

## 2018-09-17 DIAGNOSIS — Z90.49 ACQUIRED ABSENCE OF OTHER SPECIFIED PARTS OF DIGESTIVE TRACT: Chronic | ICD-10-CM

## 2018-09-17 DIAGNOSIS — Z98.890 OTHER SPECIFIED POSTPROCEDURAL STATES: Chronic | ICD-10-CM

## 2018-09-17 LAB
ALBUMIN SERPL ELPH-MCNC: 3.8 G/DL — SIGNIFICANT CHANGE UP (ref 3.3–5)
ALP SERPL-CCNC: 60 U/L — SIGNIFICANT CHANGE UP (ref 40–120)
ALT FLD-CCNC: 13 U/L — SIGNIFICANT CHANGE UP (ref 4–33)
ANISOCYTOSIS BLD QL: SLIGHT — SIGNIFICANT CHANGE UP
AST SERPL-CCNC: 15 U/L — SIGNIFICANT CHANGE UP (ref 4–32)
BASE EXCESS BLDV CALC-SCNC: 1.9 MMOL/L — SIGNIFICANT CHANGE UP
BASOPHILS # BLD AUTO: 0.01 K/UL — SIGNIFICANT CHANGE UP (ref 0–0.2)
BASOPHILS NFR BLD AUTO: 1 % — SIGNIFICANT CHANGE UP (ref 0–2)
BASOPHILS NFR SPEC: 2 % — SIGNIFICANT CHANGE UP (ref 0–2)
BILIRUB SERPL-MCNC: 0.2 MG/DL — SIGNIFICANT CHANGE UP (ref 0.2–1.2)
BUN SERPL-MCNC: 21 MG/DL — SIGNIFICANT CHANGE UP (ref 7–23)
CALCIUM SERPL-MCNC: 8.9 MG/DL — SIGNIFICANT CHANGE UP (ref 8.4–10.5)
CHLORIDE SERPL-SCNC: 101 MMOL/L — SIGNIFICANT CHANGE UP (ref 98–107)
CO2 SERPL-SCNC: 24 MMOL/L — SIGNIFICANT CHANGE UP (ref 22–31)
CREAT SERPL-MCNC: 0.82 MG/DL — SIGNIFICANT CHANGE UP (ref 0.5–1.3)
EOSINOPHIL # BLD AUTO: 0 K/UL — SIGNIFICANT CHANGE UP (ref 0–0.5)
EOSINOPHIL NFR BLD AUTO: 0 % — SIGNIFICANT CHANGE UP (ref 0–6)
EOSINOPHIL NFR FLD: 0 % — SIGNIFICANT CHANGE UP (ref 0–6)
GAS PNL BLDV: 133 MMOL/L — LOW (ref 136–146)
GLUCOSE BLDV-MCNC: 111 — HIGH (ref 70–99)
GLUCOSE SERPL-MCNC: 112 MG/DL — HIGH (ref 70–99)
HCO3 BLDV-SCNC: 25 MMOL/L — SIGNIFICANT CHANGE UP (ref 20–27)
HCT VFR BLD CALC: 28 % — LOW (ref 34.5–45)
HCT VFR BLDV CALC: 30 % — LOW (ref 34.5–45)
HGB BLD-MCNC: 9.1 G/DL — LOW (ref 11.5–15.5)
HGB BLDV-MCNC: 9.7 G/DL — LOW (ref 11.5–15.5)
HYPOCHROMIA BLD QL: SLIGHT — SIGNIFICANT CHANGE UP
IMM GRANULOCYTES # BLD AUTO: 0.02 # — SIGNIFICANT CHANGE UP
IMM GRANULOCYTES NFR BLD AUTO: 2.1 % — HIGH (ref 0–1.5)
LYMPHOCYTES # BLD AUTO: 0.13 K/UL — LOW (ref 1–3.3)
LYMPHOCYTES # BLD AUTO: 13.4 % — SIGNIFICANT CHANGE UP (ref 13–44)
LYMPHOCYTES NFR SPEC AUTO: 12 % — LOW (ref 13–44)
MANUAL SMEAR VERIFICATION: SIGNIFICANT CHANGE UP
MCHC RBC-ENTMCNC: 29.9 PG — SIGNIFICANT CHANGE UP (ref 27–34)
MCHC RBC-ENTMCNC: 32.5 % — SIGNIFICANT CHANGE UP (ref 32–36)
MCV RBC AUTO: 92.1 FL — SIGNIFICANT CHANGE UP (ref 80–100)
MONOCYTES # BLD AUTO: 0.32 K/UL — SIGNIFICANT CHANGE UP (ref 0–0.9)
MONOCYTES NFR BLD AUTO: 33 % — HIGH (ref 2–14)
MONOCYTES NFR BLD: 24 % — HIGH (ref 2–9)
NEUTROPHIL AB SER-ACNC: 54 % — SIGNIFICANT CHANGE UP (ref 43–77)
NEUTROPHILS # BLD AUTO: 0.49 K/UL — LOW (ref 1.8–7.4)
NEUTROPHILS NFR BLD AUTO: 50.5 % — SIGNIFICANT CHANGE UP (ref 43–77)
NEUTS BAND # BLD: 8 % — HIGH (ref 0–6)
NRBC # BLD: 0 /100WBC — SIGNIFICANT CHANGE UP
NRBC # FLD: 0 — SIGNIFICANT CHANGE UP
PCO2 BLDV: 40 MMHG — LOW (ref 41–51)
PH BLDV: 7.42 PH — SIGNIFICANT CHANGE UP (ref 7.32–7.43)
PLATELET # BLD AUTO: 243 K/UL — SIGNIFICANT CHANGE UP (ref 150–400)
PLATELET COUNT - ESTIMATE: NORMAL — SIGNIFICANT CHANGE UP
PMV BLD: 10.1 FL — SIGNIFICANT CHANGE UP (ref 7–13)
PO2 BLDV: 31 MMHG — LOW (ref 35–40)
POTASSIUM BLDV-SCNC: 4 MMOL/L — SIGNIFICANT CHANGE UP (ref 3.4–4.5)
POTASSIUM SERPL-MCNC: 4.3 MMOL/L — SIGNIFICANT CHANGE UP (ref 3.5–5.3)
POTASSIUM SERPL-SCNC: 4.3 MMOL/L — SIGNIFICANT CHANGE UP (ref 3.5–5.3)
PROT SERPL-MCNC: 6.3 G/DL — SIGNIFICANT CHANGE UP (ref 6–8.3)
RBC # BLD: 3.04 M/UL — LOW (ref 3.8–5.2)
RBC # FLD: 15.4 % — HIGH (ref 10.3–14.5)
SAO2 % BLDV: 51.5 % — LOW (ref 60–85)
SODIUM SERPL-SCNC: 137 MMOL/L — SIGNIFICANT CHANGE UP (ref 135–145)
WBC # BLD: 1.08 K/UL — CRITICAL LOW (ref 3.8–10.5)
WBC # FLD AUTO: 1.08 K/UL — CRITICAL LOW (ref 3.8–10.5)

## 2018-09-17 PROCEDURE — 93010 ELECTROCARDIOGRAM REPORT: CPT | Mod: NC

## 2018-09-17 PROCEDURE — 99285 EMERGENCY DEPT VISIT HI MDM: CPT | Mod: 25

## 2018-09-17 PROCEDURE — 70450 CT HEAD/BRAIN W/O DYE: CPT | Mod: 26

## 2018-09-17 PROCEDURE — 71046 X-RAY EXAM CHEST 2 VIEWS: CPT | Mod: 26

## 2018-09-17 RX ORDER — SODIUM CHLORIDE 9 MG/ML
1000 INJECTION, SOLUTION INTRAVENOUS ONCE
Qty: 0 | Refills: 0 | Status: COMPLETED | OUTPATIENT
Start: 2018-09-17 | End: 2018-09-17

## 2018-09-17 RX ORDER — SODIUM CHLORIDE 9 MG/ML
1000 INJECTION INTRAMUSCULAR; INTRAVENOUS; SUBCUTANEOUS ONCE
Qty: 0 | Refills: 0 | Status: COMPLETED | OUTPATIENT
Start: 2018-09-17 | End: 2018-09-17

## 2018-09-17 RX ADMIN — SODIUM CHLORIDE 1000 MILLILITER(S): 9 INJECTION INTRAMUSCULAR; INTRAVENOUS; SUBCUTANEOUS at 15:59

## 2018-09-17 RX ADMIN — SODIUM CHLORIDE 1000 MILLILITER(S): 9 INJECTION INTRAMUSCULAR; INTRAVENOUS; SUBCUTANEOUS at 14:47

## 2018-09-17 RX ADMIN — SODIUM CHLORIDE 1000 MILLILITER(S): 9 INJECTION, SOLUTION INTRAVENOUS at 15:59

## 2018-09-17 NOTE — ED PROVIDER NOTE - OBJECTIVE STATEMENT
67M w/ pmh breast CA (on chemo), h/o bariatric sleeve (1 yr ago) p/w syncope. Pt was standing up for 15 min against the wall in a meeting when felt nauseous, malaised and passed out, sliding down wall to sitting position; witnessed by MD and pt was told may have had seizure; no head trauma was witnessed, also no convulsive activity, no tongue biting or incontinence; pt was out for less than 1 minute, came back to consciousness immediately and was not confused at that time. Reports decr po 2/2 mouth sores, decr appetite. No sob, cough, fever, chest / abd pain. Urinating, stooling normally w/out issue. No recent travel, illness, or hospitalization. Was switched from last chemo onto tamoxifen 2 wks ago, has only taken that one dose. 1 wk ago full body rash     Onc: Arian Hussein ([facility]) [phone]  Surg: Sam Price

## 2018-09-17 NOTE — ED PROVIDER NOTE - PSH
H/O hernia repair  Tamiko, Cristal  History of sleeve gastrectomy  2014  S/P appendectomy  1967  S/P   x3 (, , )  S/P laparoscopic cholecystectomy  3/8/18

## 2018-09-17 NOTE — ED PROVIDER NOTE - MEDICAL DECISION MAKING DETAILS
67F w/ breast cancer on chemo p/w syncopal episodes well appearing now - will check cth, cxr, ua, labs, d/w oncologist, reassess

## 2018-09-17 NOTE — ED PROVIDER NOTE - ATTENDING CONTRIBUTION TO CARE
67F p/w syncope, was standing during the meeting, prodrome of nausea, lightheadedness, no convulsions.  Out for about 1 minute, quickly back to baseline and not confused.  2 similar episodes in the past couple of weeks.  Both were similar and occurred while in the bathroom.  H/o breast CA, on chemo.  Also c/o rash that onc believes is r/t taxol.  H/o bariatric sx 1 year ago.  Reports decr PO intake due to sores in mouth.  No SOB.  Feeling well at present 67F p/w syncope, was standing during the meeting, prodrome of nausea, lightheadedness, no convulsions.  Out for about 1 minute, quickly back to baseline and not confused.  2 similar episodes in the past couple of weeks.  Both were similar and occurred while in the bathroom.  H/o breast CA, on chemo.  Also c/o rash that onc believes is r/t taxol.  H/o bariatric sx 1 year ago.  Reports decr PO intake due to sores in mouth.  No SOB.  Feeling well at present.  Was rx'ed mouthwash to deal with her sores, hasn't picked it up.  Plan - rx fluids, check labs, CE, reassess, to be d/w H/O.  VS:  unremarkable    GEN - NAD; malaise; A+O x3   HEAD - NC/AT     ENT - PEERL, EOMI, mucous membranes dry , no discharge      NECK: Neck supple, non-tender without lymphadenopathy, no masses, no JVD  PULM - CTA b/l,  symmetric breath sounds  COR -  normal heart sounds    ABD - , ND, NT, soft,  BACK - no CVA tenderness, nontender spine     EXTREMS - no edema, no deformity, warm and well perfused    SKIN - diffuse MP rash to trunk, no bruising      NEUROLOGIC - alert, CN 2-12 intact, sensation nl, motor no focal deficit.

## 2018-09-17 NOTE — ED ADULT NURSE REASSESSMENT NOTE - NS ED NURSE REASSESS COMMENT FT1
pt received from day RN- pt in NAD awake, a/ox3, put on cardiac monitor, pending radiology testing, pt denies any CP, SOB, dizziness, weakness, IV fluids running as ordered, will continue to monitor

## 2018-09-17 NOTE — ED ADULT TRIAGE NOTE - CHIEF COMPLAINT QUOTE
p/t with hx breast ca on chemo had an episode of  syncope this afternoon, witness by MD possible seizures, p/t denies any discomfort @ present no neuro deficits noted

## 2018-09-17 NOTE — ED ADULT NURSE NOTE - PMH
Asthma    Breast cancer, left    Chronic fatigue syndrome    Esophagitis    GERD (gastroesophageal reflux disease)  diet-controlled  Hiatal hernia    Malignant neoplasm of breast  left, diagnosed 4/2018  Migraine    Morbid obesity with BMI of 40.0-44.9, adult  s/p sleeve gastrectomy 2014  MVP (mitral valve prolapse)  recent echo 6/27/18  Shingles outbreak  left orbital area 5/7/14

## 2018-09-17 NOTE — ED ADULT NURSE NOTE - OBJECTIVE STATEMENT
pt alert and oriented, states that she is receiving chemo for breast cancer. pt states today around 1130 AM she was standing against a wall for a meeting when she passed out. pt states was witnessed, witnesses states no hitting her head, states she was out for under 1 minute. pt states when she came to she was fine, no period of confusion. pt states in the past few weeks this happened two other times. all three times pt states she was 'not feeling right' all day. pt denies any other symptoms, no pain, no nausea, no feeling of dizziness currently.

## 2018-09-17 NOTE — ED PROVIDER NOTE - PHYSICAL EXAMINATION
*GEN:   comfortable, in no acute distress, AOx3    ///    *EYES:   pupils equally round and reactive to light, extra-occular movements intact    ///    *HEENT:   airway patent, moist mucosal membranes    ///    *CV:   regular rate and rhythm    ///    *RESP:   clear to auscultation bilaterally, non-labored    ///    *ABD:   soft, non-tender    ///    *:   no cva/flank tenderness    ///    *MSK:   no MSK tenderness or limited ROM    ///    *SKIN:   dry, intact    ///    *NEURO:   AOx3, cranial nerves intact throughout, strength 5/5, no focal loss of sensation, no pronator drift, finger/nose normal, ambulating w/ normal gait

## 2018-09-17 NOTE — ED ADULT NURSE NOTE - NSIMPLEMENTINTERV_GEN_ALL_ED
Implemented All Fall Risk Interventions:  [place] to call system. Call bell, personal items and telephone within reach. Instruct patient to call for assistance. Room bathroom lighting operational. Non-slip footwear when patient is off stretcher. Physically safe environment: no spills, clutter or unnecessary equipment. Stretcher in lowest position, wheels locked, appropriate side rails in place. Provide visual cue, wrist band, yellow gown, etc. Monitor gait and stability. Monitor for mental status changes and reorient to person, place, and time. Review medications for side effects contributing to fall risk. Reinforce activity limits and safety measures with patient and family.

## 2018-09-17 NOTE — ED PROVIDER NOTE - PROGRESS NOTE DETAILS
Daryl Blanchard PGY2: d/w Dr. Hussein onc - agreed w/ plan, thinks outpatient cards workup would be appropriate if testing wnl Daryl Blanchard PGY2: d/w onc w/ neutropenia - still agrees with original plan for dc w/ close outpt f/u tomorrow

## 2018-09-20 ENCOUNTER — APPOINTMENT (OUTPATIENT)
Dept: HEMATOLOGY ONCOLOGY | Facility: CLINIC | Age: 68
End: 2018-09-20
Payer: COMMERCIAL

## 2018-09-20 ENCOUNTER — RESULT REVIEW (OUTPATIENT)
Age: 68
End: 2018-09-20

## 2018-09-20 ENCOUNTER — APPOINTMENT (OUTPATIENT)
Dept: INFUSION THERAPY | Facility: HOSPITAL | Age: 68
End: 2018-09-20

## 2018-09-20 VITALS
SYSTOLIC BLOOD PRESSURE: 102 MMHG | WEIGHT: 191.8 LBS | TEMPERATURE: 98.8 F | BODY MASS INDEX: 35.08 KG/M2 | HEART RATE: 85 BPM | DIASTOLIC BLOOD PRESSURE: 69 MMHG | OXYGEN SATURATION: 98 % | RESPIRATION RATE: 16 BRPM

## 2018-09-20 LAB
BASOPHILS # BLD AUTO: 0 K/UL — SIGNIFICANT CHANGE UP (ref 0–0.2)
EOSINOPHIL # BLD AUTO: 0.1 K/UL — SIGNIFICANT CHANGE UP (ref 0–0.5)
EOSINOPHIL NFR BLD AUTO: 1 % — SIGNIFICANT CHANGE UP (ref 0–6)
HCT VFR BLD CALC: 31.5 % — LOW (ref 34.5–45)
HGB BLD-MCNC: 10.5 G/DL — LOW (ref 11.5–15.5)
LYMPHOCYTES # BLD AUTO: 0.3 K/UL — LOW (ref 1–3.3)
LYMPHOCYTES # BLD AUTO: 12 % — LOW (ref 13–44)
MCHC RBC-ENTMCNC: 30.2 PG — SIGNIFICANT CHANGE UP (ref 27–34)
MCHC RBC-ENTMCNC: 33.3 G/DL — SIGNIFICANT CHANGE UP (ref 32–36)
MCV RBC AUTO: 90.7 FL — SIGNIFICANT CHANGE UP (ref 80–100)
MONOCYTES # BLD AUTO: 0.6 K/UL — SIGNIFICANT CHANGE UP (ref 0–0.9)
MONOCYTES NFR BLD AUTO: 33 % — HIGH (ref 2–14)
NEUTROPHILS # BLD AUTO: 0.7 K/UL — LOW (ref 1.8–7.4)
NEUTROPHILS NFR BLD AUTO: 53 % — SIGNIFICANT CHANGE UP (ref 43–77)
NEUTS BAND # BLD: 1 % — SIGNIFICANT CHANGE UP (ref 0–8)
PLAT MORPH BLD: NORMAL — SIGNIFICANT CHANGE UP
PLATELET # BLD AUTO: 167 K/UL — SIGNIFICANT CHANGE UP (ref 150–400)
RBC # BLD: 3.48 M/UL — LOW (ref 3.8–5.2)
RBC # FLD: 14.6 % — HIGH (ref 10.3–14.5)
RBC BLD AUTO: SIGNIFICANT CHANGE UP
WBC # BLD: 1.7 K/UL — LOW (ref 3.8–10.5)
WBC # FLD AUTO: 1.7 K/UL — LOW (ref 3.8–10.5)

## 2018-09-20 PROCEDURE — 99215 OFFICE O/P EST HI 40 MIN: CPT

## 2018-09-20 RX ORDER — METHYLPREDNISOLONE 4 MG/1
4 TABLET ORAL
Qty: 1 | Refills: 0 | Status: DISCONTINUED | COMMUNITY
Start: 2018-09-12 | End: 2018-09-20

## 2018-09-21 DIAGNOSIS — R11.2 NAUSEA WITH VOMITING, UNSPECIFIED: ICD-10-CM

## 2018-09-21 DIAGNOSIS — E86.0 DEHYDRATION: ICD-10-CM

## 2018-09-22 ENCOUNTER — RESULT REVIEW (OUTPATIENT)
Age: 68
End: 2018-09-22

## 2018-09-22 ENCOUNTER — APPOINTMENT (OUTPATIENT)
Dept: INFUSION THERAPY | Facility: HOSPITAL | Age: 68
End: 2018-09-22

## 2018-09-22 LAB
BASOPHILS # BLD AUTO: 0 K/UL — SIGNIFICANT CHANGE UP (ref 0–0.2)
EOSINOPHIL # BLD AUTO: 0 K/UL — SIGNIFICANT CHANGE UP (ref 0–0.5)
HCT VFR BLD CALC: 29.1 % — LOW (ref 34.5–45)
HGB BLD-MCNC: 10 G/DL — LOW (ref 11.5–15.5)
LYMPHOCYTES # BLD AUTO: 0.5 K/UL — LOW (ref 1–3.3)
LYMPHOCYTES # BLD AUTO: 21 % — SIGNIFICANT CHANGE UP (ref 13–44)
MCHC RBC-ENTMCNC: 31.4 PG — SIGNIFICANT CHANGE UP (ref 27–34)
MCHC RBC-ENTMCNC: 34.3 G/DL — SIGNIFICANT CHANGE UP (ref 32–36)
MCV RBC AUTO: 91.5 FL — SIGNIFICANT CHANGE UP (ref 80–100)
MONOCYTES # BLD AUTO: 1 K/UL — HIGH (ref 0–0.9)
MONOCYTES NFR BLD AUTO: 52 % — HIGH (ref 2–14)
NEUTROPHILS # BLD AUTO: 0.6 K/UL — LOW (ref 1.8–7.4)
NEUTROPHILS NFR BLD AUTO: 22 % — LOW (ref 43–77)
NEUTS BAND # BLD: 5 % — SIGNIFICANT CHANGE UP (ref 0–8)
PLAT MORPH BLD: NORMAL — SIGNIFICANT CHANGE UP
PLATELET # BLD AUTO: 199 K/UL — SIGNIFICANT CHANGE UP (ref 150–400)
RBC # BLD: 3.18 M/UL — LOW (ref 3.8–5.2)
RBC # FLD: 14.5 % — SIGNIFICANT CHANGE UP (ref 10.3–14.5)
RBC BLD AUTO: SIGNIFICANT CHANGE UP
WBC # BLD: 2.2 K/UL — LOW (ref 3.8–10.5)
WBC # FLD AUTO: 2.2 K/UL — LOW (ref 3.8–10.5)

## 2018-09-26 ENCOUNTER — RESULT REVIEW (OUTPATIENT)
Age: 68
End: 2018-09-26

## 2018-09-26 ENCOUNTER — APPOINTMENT (OUTPATIENT)
Dept: INFUSION THERAPY | Facility: HOSPITAL | Age: 68
End: 2018-09-26

## 2018-09-26 ENCOUNTER — LABORATORY RESULT (OUTPATIENT)
Age: 68
End: 2018-09-26

## 2018-09-26 ENCOUNTER — APPOINTMENT (OUTPATIENT)
Dept: HEMATOLOGY ONCOLOGY | Facility: CLINIC | Age: 68
End: 2018-09-26
Payer: COMMERCIAL

## 2018-09-26 VITALS — SYSTOLIC BLOOD PRESSURE: 98 MMHG | DIASTOLIC BLOOD PRESSURE: 64 MMHG | HEART RATE: 91 BPM | TEMPERATURE: 98.8 F

## 2018-09-26 VITALS — HEART RATE: 102 BPM | SYSTOLIC BLOOD PRESSURE: 88 MMHG | DIASTOLIC BLOOD PRESSURE: 60 MMHG

## 2018-09-26 VITALS — DIASTOLIC BLOOD PRESSURE: 66 MMHG | HEART RATE: 91 BPM | SYSTOLIC BLOOD PRESSURE: 98 MMHG

## 2018-09-26 DIAGNOSIS — B37.0 CANDIDAL STOMATITIS: ICD-10-CM

## 2018-09-26 DIAGNOSIS — T45.1X5A AGRANULOCYTOSIS SECONDARY TO CANCER CHEMOTHERAPY: ICD-10-CM

## 2018-09-26 DIAGNOSIS — D70.1 AGRANULOCYTOSIS SECONDARY TO CANCER CHEMOTHERAPY: ICD-10-CM

## 2018-09-26 LAB
BASOPHILS # BLD AUTO: 0 K/UL — SIGNIFICANT CHANGE UP (ref 0–0.2)
BASOPHILS NFR BLD AUTO: 0.1 % — SIGNIFICANT CHANGE UP (ref 0–2)
EOSINOPHIL # BLD AUTO: 0 K/UL — SIGNIFICANT CHANGE UP (ref 0–0.5)
EOSINOPHIL NFR BLD AUTO: 0.9 % — SIGNIFICANT CHANGE UP (ref 0–6)
HCT VFR BLD CALC: 30.2 % — LOW (ref 34.5–45)
HGB BLD-MCNC: 10 G/DL — LOW (ref 11.5–15.5)
LYMPHOCYTES # BLD AUTO: 0.9 K/UL — LOW (ref 1–3.3)
LYMPHOCYTES # BLD AUTO: 18.2 % — SIGNIFICANT CHANGE UP (ref 13–44)
MCHC RBC-ENTMCNC: 29.9 PG — SIGNIFICANT CHANGE UP (ref 27–34)
MCHC RBC-ENTMCNC: 33.1 G/DL — SIGNIFICANT CHANGE UP (ref 32–36)
MCV RBC AUTO: 90.4 FL — SIGNIFICANT CHANGE UP (ref 80–100)
MONOCYTES # BLD AUTO: 1.5 K/UL — HIGH (ref 0–0.9)
MONOCYTES NFR BLD AUTO: 32.4 % — HIGH (ref 2–14)
NEUTROPHILS # BLD AUTO: 2.3 K/UL — SIGNIFICANT CHANGE UP (ref 1.8–7.4)
NEUTROPHILS NFR BLD AUTO: 48.3 % — SIGNIFICANT CHANGE UP (ref 43–77)
PLATELET # BLD AUTO: 352 K/UL — SIGNIFICANT CHANGE UP (ref 150–400)
RBC # BLD: 3.35 M/UL — LOW (ref 3.8–5.2)
RBC # FLD: 15 % — HIGH (ref 10.3–14.5)
WBC # BLD: 4.7 K/UL — SIGNIFICANT CHANGE UP (ref 3.8–10.5)
WBC # FLD AUTO: 4.7 K/UL — SIGNIFICANT CHANGE UP (ref 3.8–10.5)

## 2018-09-26 PROCEDURE — 99214 OFFICE O/P EST MOD 30 MIN: CPT

## 2018-09-27 DIAGNOSIS — Z51.11 ENCOUNTER FOR ANTINEOPLASTIC CHEMOTHERAPY: ICD-10-CM

## 2018-09-27 DIAGNOSIS — Z51.89 ENCOUNTER FOR OTHER SPECIFIED AFTERCARE: ICD-10-CM

## 2018-09-29 ENCOUNTER — RESULT REVIEW (OUTPATIENT)
Age: 68
End: 2018-09-29

## 2018-09-29 ENCOUNTER — APPOINTMENT (OUTPATIENT)
Dept: INFUSION THERAPY | Facility: HOSPITAL | Age: 68
End: 2018-09-29

## 2018-09-29 ENCOUNTER — LABORATORY RESULT (OUTPATIENT)
Age: 68
End: 2018-09-29

## 2018-09-29 LAB
BASOPHILS # BLD AUTO: 0 K/UL — SIGNIFICANT CHANGE UP (ref 0–0.2)
EOSINOPHIL # BLD AUTO: 0.1 K/UL — SIGNIFICANT CHANGE UP (ref 0–0.5)
EOSINOPHIL NFR BLD AUTO: 1 % — SIGNIFICANT CHANGE UP (ref 0–6)
HCT VFR BLD CALC: 28.1 % — LOW (ref 34.5–45)
HGB BLD-MCNC: 9.4 G/DL — LOW (ref 11.5–15.5)
LYMPHOCYTES # BLD AUTO: 0.7 K/UL — LOW (ref 1–3.3)
LYMPHOCYTES # BLD AUTO: 2 % — LOW (ref 13–44)
MCHC RBC-ENTMCNC: 30.4 PG — SIGNIFICANT CHANGE UP (ref 27–34)
MCHC RBC-ENTMCNC: 33.5 G/DL — SIGNIFICANT CHANGE UP (ref 32–36)
MCV RBC AUTO: 90.7 FL — SIGNIFICANT CHANGE UP (ref 80–100)
METAMYELOCYTES # FLD: 1 % — HIGH (ref 0–0)
MONOCYTES # BLD AUTO: 0.3 K/UL — SIGNIFICANT CHANGE UP (ref 0–0.9)
MONOCYTES NFR BLD AUTO: 2 % — SIGNIFICANT CHANGE UP (ref 2–14)
NEUTROPHILS # BLD AUTO: 31.6 K/UL — HIGH (ref 1.8–7.4)
NEUTROPHILS NFR BLD AUTO: 86 % — HIGH (ref 43–77)
NEUTS BAND # BLD: 8 % — SIGNIFICANT CHANGE UP (ref 0–8)
PLAT MORPH BLD: NORMAL — SIGNIFICANT CHANGE UP
PLATELET # BLD AUTO: 299 K/UL — SIGNIFICANT CHANGE UP (ref 150–400)
RBC # BLD: 3.1 M/UL — LOW (ref 3.8–5.2)
RBC # FLD: 14.8 % — HIGH (ref 10.3–14.5)
RBC BLD AUTO: SIGNIFICANT CHANGE UP
WBC # BLD: 32.8 K/UL — HIGH (ref 3.8–10.5)
WBC # FLD AUTO: 32.8 K/UL — HIGH (ref 3.8–10.5)

## 2018-10-01 ENCOUNTER — OUTPATIENT (OUTPATIENT)
Dept: OUTPATIENT SERVICES | Facility: HOSPITAL | Age: 68
LOS: 1 days | End: 2018-10-01
Payer: COMMERCIAL

## 2018-10-01 ENCOUNTER — APPOINTMENT (OUTPATIENT)
Dept: CV DIAGNOSITCS | Facility: HOSPITAL | Age: 68
End: 2018-10-01

## 2018-10-01 DIAGNOSIS — Z98.89 OTHER SPECIFIED POSTPROCEDURAL STATES: Chronic | ICD-10-CM

## 2018-10-01 DIAGNOSIS — I25.10 ATHEROSCLEROTIC HEART DISEASE OF NATIVE CORONARY ARTERY WITHOUT ANGINA PECTORIS: ICD-10-CM

## 2018-10-01 DIAGNOSIS — Z98.890 OTHER SPECIFIED POSTPROCEDURAL STATES: Chronic | ICD-10-CM

## 2018-10-01 DIAGNOSIS — Z90.49 ACQUIRED ABSENCE OF OTHER SPECIFIED PARTS OF DIGESTIVE TRACT: Chronic | ICD-10-CM

## 2018-10-01 DIAGNOSIS — Z90.3 ACQUIRED ABSENCE OF STOMACH [PART OF]: Chronic | ICD-10-CM

## 2018-10-01 PROCEDURE — 93306 TTE W/DOPPLER COMPLETE: CPT | Mod: 26

## 2018-10-01 PROCEDURE — 0399T: CPT

## 2018-10-01 PROCEDURE — 93306 TTE W/DOPPLER COMPLETE: CPT

## 2018-10-03 ENCOUNTER — LABORATORY RESULT (OUTPATIENT)
Age: 68
End: 2018-10-03

## 2018-10-03 ENCOUNTER — RESULT REVIEW (OUTPATIENT)
Age: 68
End: 2018-10-03

## 2018-10-03 ENCOUNTER — APPOINTMENT (OUTPATIENT)
Dept: INFUSION THERAPY | Facility: HOSPITAL | Age: 68
End: 2018-10-03

## 2018-10-03 LAB
HCT VFR BLD CALC: 26.4 % — LOW (ref 34.5–45)
HGB BLD-MCNC: 8.7 G/DL — LOW (ref 11.5–15.5)
MCHC RBC-ENTMCNC: 30.2 PG — SIGNIFICANT CHANGE UP (ref 27–34)
MCHC RBC-ENTMCNC: 33.1 G/DL — SIGNIFICANT CHANGE UP (ref 32–36)
MCV RBC AUTO: 91.3 FL — SIGNIFICANT CHANGE UP (ref 80–100)
PLATELET # BLD AUTO: 245 K/UL — SIGNIFICANT CHANGE UP (ref 150–400)
RBC # BLD: 2.89 M/UL — LOW (ref 3.8–5.2)
RBC # FLD: 15.3 % — HIGH (ref 10.3–14.5)
WBC # BLD: 18.6 K/UL — HIGH (ref 3.8–10.5)
WBC # FLD AUTO: 18.6 K/UL — HIGH (ref 3.8–10.5)

## 2018-10-09 ENCOUNTER — OUTPATIENT (OUTPATIENT)
Dept: OUTPATIENT SERVICES | Facility: HOSPITAL | Age: 68
LOS: 1 days | Discharge: HOME | End: 2018-10-09

## 2018-10-09 DIAGNOSIS — C50.919 MALIGNANT NEOPLASM OF UNSPECIFIED SITE OF UNSPECIFIED FEMALE BREAST: ICD-10-CM

## 2018-10-09 DIAGNOSIS — Z98.89 OTHER SPECIFIED POSTPROCEDURAL STATES: Chronic | ICD-10-CM

## 2018-10-09 DIAGNOSIS — Z98.890 OTHER SPECIFIED POSTPROCEDURAL STATES: Chronic | ICD-10-CM

## 2018-10-09 DIAGNOSIS — Z90.3 ACQUIRED ABSENCE OF STOMACH [PART OF]: Chronic | ICD-10-CM

## 2018-10-09 DIAGNOSIS — Z90.49 ACQUIRED ABSENCE OF OTHER SPECIFIED PARTS OF DIGESTIVE TRACT: Chronic | ICD-10-CM

## 2018-10-10 ENCOUNTER — APPOINTMENT (OUTPATIENT)
Dept: HEMATOLOGY ONCOLOGY | Facility: CLINIC | Age: 68
End: 2018-10-10
Payer: COMMERCIAL

## 2018-10-10 ENCOUNTER — RESULT REVIEW (OUTPATIENT)
Age: 68
End: 2018-10-10

## 2018-10-10 VITALS
WEIGHT: 198.57 LBS | SYSTOLIC BLOOD PRESSURE: 121 MMHG | RESPIRATION RATE: 16 BRPM | OXYGEN SATURATION: 97 % | BODY MASS INDEX: 36.32 KG/M2 | HEART RATE: 70 BPM | TEMPERATURE: 98.6 F | DIASTOLIC BLOOD PRESSURE: 75 MMHG

## 2018-10-10 LAB
HCT VFR BLD CALC: 28.5 % — LOW (ref 34.5–45)
HGB BLD-MCNC: 9.4 G/DL — LOW (ref 11.5–15.5)
MCHC RBC-ENTMCNC: 30.5 PG — SIGNIFICANT CHANGE UP (ref 27–34)
MCHC RBC-ENTMCNC: 33 G/DL — SIGNIFICANT CHANGE UP (ref 32–36)
MCV RBC AUTO: 92.5 FL — SIGNIFICANT CHANGE UP (ref 80–100)
PLATELET # BLD AUTO: 304 K/UL — SIGNIFICANT CHANGE UP (ref 150–400)
RBC # BLD: 3.09 M/UL — LOW (ref 3.8–5.2)
RBC # FLD: 16.1 % — HIGH (ref 10.3–14.5)
WBC # BLD: 9.5 K/UL — SIGNIFICANT CHANGE UP (ref 3.8–10.5)
WBC # FLD AUTO: 9.5 K/UL — SIGNIFICANT CHANGE UP (ref 3.8–10.5)

## 2018-10-10 PROCEDURE — 99214 OFFICE O/P EST MOD 30 MIN: CPT

## 2018-10-10 RX ORDER — NYSTATIN 100000 [USP'U]/ML
100000 SUSPENSION ORAL
Qty: 200 | Refills: 0 | Status: DISCONTINUED | COMMUNITY
Start: 2018-09-22 | End: 2018-10-10

## 2018-10-10 RX ORDER — LEVOFLOXACIN 500 MG/1
500 TABLET, FILM COATED ORAL DAILY
Qty: 7 | Refills: 0 | Status: DISCONTINUED | COMMUNITY
Start: 2018-09-20 | End: 2018-10-10

## 2018-10-11 ENCOUNTER — APPOINTMENT (OUTPATIENT)
Dept: INFUSION THERAPY | Facility: HOSPITAL | Age: 68
End: 2018-10-11

## 2018-10-12 DIAGNOSIS — Z51.11 ENCOUNTER FOR ANTINEOPLASTIC CHEMOTHERAPY: ICD-10-CM

## 2018-10-12 DIAGNOSIS — R11.2 NAUSEA WITH VOMITING, UNSPECIFIED: ICD-10-CM

## 2018-10-12 DIAGNOSIS — Z51.89 ENCOUNTER FOR OTHER SPECIFIED AFTERCARE: ICD-10-CM

## 2018-10-18 ENCOUNTER — APPOINTMENT (OUTPATIENT)
Dept: INFUSION THERAPY | Facility: HOSPITAL | Age: 68
End: 2018-10-18

## 2018-10-18 ENCOUNTER — LABORATORY RESULT (OUTPATIENT)
Age: 68
End: 2018-10-18

## 2018-10-18 ENCOUNTER — RESULT REVIEW (OUTPATIENT)
Age: 68
End: 2018-10-18

## 2018-10-18 LAB
HCT VFR BLD CALC: 27.9 % — LOW (ref 34.5–45)
HGB BLD-MCNC: 9.3 G/DL — LOW (ref 11.5–15.5)
MCHC RBC-ENTMCNC: 30.9 PG — SIGNIFICANT CHANGE UP (ref 27–34)
MCHC RBC-ENTMCNC: 33.4 G/DL — SIGNIFICANT CHANGE UP (ref 32–36)
MCV RBC AUTO: 92.5 FL — SIGNIFICANT CHANGE UP (ref 80–100)
PLATELET # BLD AUTO: 309 K/UL — SIGNIFICANT CHANGE UP (ref 150–400)
RBC # BLD: 3.02 M/UL — LOW (ref 3.8–5.2)
RBC # FLD: 16.2 % — HIGH (ref 10.3–14.5)
WBC # BLD: 18.6 K/UL — HIGH (ref 3.8–10.5)
WBC # FLD AUTO: 18.6 K/UL — HIGH (ref 3.8–10.5)

## 2018-10-24 ENCOUNTER — APPOINTMENT (OUTPATIENT)
Dept: HEMATOLOGY ONCOLOGY | Facility: CLINIC | Age: 68
End: 2018-10-24
Payer: COMMERCIAL

## 2018-10-24 VITALS
DIASTOLIC BLOOD PRESSURE: 70 MMHG | OXYGEN SATURATION: 97 % | BODY MASS INDEX: 35.77 KG/M2 | SYSTOLIC BLOOD PRESSURE: 109 MMHG | HEART RATE: 77 BPM | WEIGHT: 195.55 LBS | TEMPERATURE: 98.2 F | RESPIRATION RATE: 16 BRPM

## 2018-10-24 DIAGNOSIS — K13.79 OTHER LESIONS OF ORAL MUCOSA: ICD-10-CM

## 2018-10-24 DIAGNOSIS — Z87.898 PERSONAL HISTORY OF OTHER SPECIFIED CONDITIONS: ICD-10-CM

## 2018-10-24 DIAGNOSIS — Z87.2 PERSONAL HISTORY OF DISEASES OF THE SKIN AND SUBCUTANEOUS TISSUE: ICD-10-CM

## 2018-10-24 PROCEDURE — 99214 OFFICE O/P EST MOD 30 MIN: CPT

## 2018-10-25 ENCOUNTER — RESULT REVIEW (OUTPATIENT)
Age: 68
End: 2018-10-25

## 2018-10-25 ENCOUNTER — APPOINTMENT (OUTPATIENT)
Dept: INFUSION THERAPY | Facility: HOSPITAL | Age: 68
End: 2018-10-25

## 2018-10-25 LAB
BASOPHILS # BLD AUTO: 0 K/UL — SIGNIFICANT CHANGE UP (ref 0–0.2)
BASOPHILS NFR BLD AUTO: 0.5 % — SIGNIFICANT CHANGE UP (ref 0–2)
EOSINOPHIL # BLD AUTO: 0.4 K/UL — SIGNIFICANT CHANGE UP (ref 0–0.5)
EOSINOPHIL NFR BLD AUTO: 4.1 % — SIGNIFICANT CHANGE UP (ref 0–6)
HCT VFR BLD CALC: 31.1 % — LOW (ref 34.5–45)
HGB BLD-MCNC: 10.2 G/DL — LOW (ref 11.5–15.5)
LYMPHOCYTES # BLD AUTO: 0.8 K/UL — LOW (ref 1–3.3)
LYMPHOCYTES # BLD AUTO: 7.9 % — LOW (ref 13–44)
MCHC RBC-ENTMCNC: 30.5 PG — SIGNIFICANT CHANGE UP (ref 27–34)
MCHC RBC-ENTMCNC: 32.8 G/DL — SIGNIFICANT CHANGE UP (ref 32–36)
MCV RBC AUTO: 92.8 FL — SIGNIFICANT CHANGE UP (ref 80–100)
MONOCYTES # BLD AUTO: 0.8 K/UL — SIGNIFICANT CHANGE UP (ref 0–0.9)
MONOCYTES NFR BLD AUTO: 7.8 % — SIGNIFICANT CHANGE UP (ref 2–14)
NEUTROPHILS # BLD AUTO: 8.1 K/UL — HIGH (ref 1.8–7.4)
NEUTROPHILS NFR BLD AUTO: 79.8 % — HIGH (ref 43–77)
PLATELET # BLD AUTO: 295 K/UL — SIGNIFICANT CHANGE UP (ref 150–400)
RBC # BLD: 3.35 M/UL — LOW (ref 3.8–5.2)
RBC # FLD: 16.6 % — HIGH (ref 10.3–14.5)
WBC # BLD: 10.1 K/UL — SIGNIFICANT CHANGE UP (ref 3.8–10.5)
WBC # FLD AUTO: 10.1 K/UL — SIGNIFICANT CHANGE UP (ref 3.8–10.5)

## 2018-10-27 PROBLEM — Z87.898 HISTORY OF NAUSEA: Status: RESOLVED | Noted: 2018-10-10 | Resolved: 2018-10-27

## 2018-10-27 PROBLEM — Z87.2 HISTORY OF DERMATITIS: Status: RESOLVED | Noted: 2018-09-12 | Resolved: 2018-10-27

## 2018-10-27 PROBLEM — K13.79 MOUTH SORES: Status: RESOLVED | Noted: 2018-09-17 | Resolved: 2018-10-27

## 2018-10-27 RX ORDER — IBUPROFEN 600 MG/1
600 TABLET, FILM COATED ORAL
Qty: 15 | Refills: 0 | Status: DISCONTINUED | COMMUNITY
Start: 2018-08-22

## 2018-10-27 RX ORDER — AMOXICILLIN AND CLAVULANATE POTASSIUM 875; 125 MG/1; MG/1
875-125 TABLET, COATED ORAL
Qty: 20 | Refills: 0 | Status: DISCONTINUED | COMMUNITY
Start: 2018-08-29

## 2018-11-01 ENCOUNTER — RESULT REVIEW (OUTPATIENT)
Age: 68
End: 2018-11-01

## 2018-11-01 ENCOUNTER — APPOINTMENT (OUTPATIENT)
Dept: INFUSION THERAPY | Facility: HOSPITAL | Age: 68
End: 2018-11-01

## 2018-11-01 ENCOUNTER — APPOINTMENT (OUTPATIENT)
Dept: HEMATOLOGY ONCOLOGY | Facility: CLINIC | Age: 68
End: 2018-11-01
Payer: COMMERCIAL

## 2018-11-01 ENCOUNTER — LABORATORY RESULT (OUTPATIENT)
Age: 68
End: 2018-11-01

## 2018-11-01 VITALS
OXYGEN SATURATION: 97 % | DIASTOLIC BLOOD PRESSURE: 77 MMHG | SYSTOLIC BLOOD PRESSURE: 110 MMHG | WEIGHT: 190.48 LBS | TEMPERATURE: 98.1 F | HEART RATE: 89 BPM | RESPIRATION RATE: 16 BRPM | BODY MASS INDEX: 34.84 KG/M2

## 2018-11-01 LAB
HCT VFR BLD CALC: 30.6 % — LOW (ref 34.5–45)
HGB BLD-MCNC: 10.3 G/DL — LOW (ref 11.5–15.5)
MCHC RBC-ENTMCNC: 31.4 PG — SIGNIFICANT CHANGE UP (ref 27–34)
MCHC RBC-ENTMCNC: 33.6 G/DL — SIGNIFICANT CHANGE UP (ref 32–36)
MCV RBC AUTO: 93.5 FL — SIGNIFICANT CHANGE UP (ref 80–100)
PLATELET # BLD AUTO: 293 K/UL — SIGNIFICANT CHANGE UP (ref 150–400)
RBC # BLD: 3.27 M/UL — LOW (ref 3.8–5.2)
RBC # FLD: 16.6 % — HIGH (ref 10.3–14.5)
WBC # BLD: 17.3 K/UL — HIGH (ref 3.8–10.5)
WBC # FLD AUTO: 17.3 K/UL — HIGH (ref 3.8–10.5)

## 2018-11-01 PROCEDURE — 99214 OFFICE O/P EST MOD 30 MIN: CPT

## 2018-11-01 RX ORDER — LIDOCAINE HYDROCHLORIDE 20 MG/ML
2 SOLUTION OROPHARYNGEAL
Qty: 150 | Refills: 0 | Status: DISCONTINUED | COMMUNITY
Start: 2018-09-17 | End: 2018-11-01

## 2018-11-13 ENCOUNTER — MOBILE ON CALL (OUTPATIENT)
Age: 68
End: 2018-11-13

## 2018-11-14 ENCOUNTER — OUTPATIENT (OUTPATIENT)
Dept: OUTPATIENT SERVICES | Facility: HOSPITAL | Age: 68
LOS: 1 days | End: 2018-11-14
Payer: COMMERCIAL

## 2018-11-14 VITALS
HEIGHT: 62 IN | DIASTOLIC BLOOD PRESSURE: 85 MMHG | WEIGHT: 192.02 LBS | TEMPERATURE: 98 F | HEART RATE: 80 BPM | OXYGEN SATURATION: 99 % | RESPIRATION RATE: 18 BRPM | SYSTOLIC BLOOD PRESSURE: 131 MMHG

## 2018-11-14 DIAGNOSIS — C50.912 MALIGNANT NEOPLASM OF UNSPECIFIED SITE OF LEFT FEMALE BREAST: ICD-10-CM

## 2018-11-14 DIAGNOSIS — C50.919 MALIGNANT NEOPLASM OF UNSPECIFIED SITE OF UNSPECIFIED FEMALE BREAST: Chronic | ICD-10-CM

## 2018-11-14 DIAGNOSIS — Z98.890 OTHER SPECIFIED POSTPROCEDURAL STATES: Chronic | ICD-10-CM

## 2018-11-14 DIAGNOSIS — Z90.49 ACQUIRED ABSENCE OF OTHER SPECIFIED PARTS OF DIGESTIVE TRACT: Chronic | ICD-10-CM

## 2018-11-14 DIAGNOSIS — D72.829 ELEVATED WHITE BLOOD CELL COUNT, UNSPECIFIED: ICD-10-CM

## 2018-11-14 DIAGNOSIS — Z98.89 OTHER SPECIFIED POSTPROCEDURAL STATES: Chronic | ICD-10-CM

## 2018-11-14 DIAGNOSIS — D48.62 NEOPLASM OF UNCERTAIN BEHAVIOR OF LEFT BREAST: ICD-10-CM

## 2018-11-14 DIAGNOSIS — Z90.3 ACQUIRED ABSENCE OF STOMACH [PART OF]: Chronic | ICD-10-CM

## 2018-11-14 DIAGNOSIS — Z01.818 ENCOUNTER FOR OTHER PREPROCEDURAL EXAMINATION: ICD-10-CM

## 2018-11-14 LAB
ANION GAP SERPL CALC-SCNC: 15 MMOL/L — SIGNIFICANT CHANGE UP (ref 5–17)
BUN SERPL-MCNC: 12 MG/DL — SIGNIFICANT CHANGE UP (ref 7–23)
CALCIUM SERPL-MCNC: 9.5 MG/DL — SIGNIFICANT CHANGE UP (ref 8.4–10.5)
CHLORIDE SERPL-SCNC: 105 MMOL/L — SIGNIFICANT CHANGE UP (ref 96–108)
CO2 SERPL-SCNC: 23 MMOL/L — SIGNIFICANT CHANGE UP (ref 22–31)
CREAT SERPL-MCNC: 0.68 MG/DL — SIGNIFICANT CHANGE UP (ref 0.5–1.3)
GLUCOSE SERPL-MCNC: 85 MG/DL — SIGNIFICANT CHANGE UP (ref 70–99)
HCT VFR BLD CALC: 33.6 % — LOW (ref 34.5–45)
HGB BLD-MCNC: 10.8 G/DL — LOW (ref 11.5–15.5)
MCHC RBC-ENTMCNC: 30.9 PG — SIGNIFICANT CHANGE UP (ref 27–34)
MCHC RBC-ENTMCNC: 32.1 GM/DL — SIGNIFICANT CHANGE UP (ref 32–36)
MCV RBC AUTO: 96 FL — SIGNIFICANT CHANGE UP (ref 80–100)
PLATELET # BLD AUTO: 392 K/UL — SIGNIFICANT CHANGE UP (ref 150–400)
POTASSIUM SERPL-MCNC: 4.2 MMOL/L — SIGNIFICANT CHANGE UP (ref 3.5–5.3)
POTASSIUM SERPL-SCNC: 4.2 MMOL/L — SIGNIFICANT CHANGE UP (ref 3.5–5.3)
RBC # BLD: 3.5 M/UL — LOW (ref 3.8–5.2)
RBC # FLD: 17.3 % — HIGH (ref 10.3–14.5)
SODIUM SERPL-SCNC: 143 MMOL/L — SIGNIFICANT CHANGE UP (ref 135–145)
WBC # BLD: 7.23 K/UL — SIGNIFICANT CHANGE UP (ref 3.8–10.5)
WBC # FLD AUTO: 7.23 K/UL — SIGNIFICANT CHANGE UP (ref 3.8–10.5)

## 2018-11-14 PROCEDURE — 80048 BASIC METABOLIC PNL TOTAL CA: CPT

## 2018-11-14 PROCEDURE — G0463: CPT

## 2018-11-14 PROCEDURE — 85027 COMPLETE CBC AUTOMATED: CPT

## 2018-11-14 RX ORDER — SODIUM CHLORIDE 9 MG/ML
3 INJECTION INTRAMUSCULAR; INTRAVENOUS; SUBCUTANEOUS EVERY 8 HOURS
Qty: 0 | Refills: 0 | Status: DISCONTINUED | OUTPATIENT
Start: 2018-11-28 | End: 2018-12-13

## 2018-11-14 RX ORDER — LIDOCAINE HCL 20 MG/ML
0.2 VIAL (ML) INJECTION ONCE
Qty: 0 | Refills: 0 | Status: DISCONTINUED | OUTPATIENT
Start: 2018-11-28 | End: 2018-12-13

## 2018-11-14 NOTE — H&P PST ADULT - HISTORY OF PRESENT ILLNESS
This is a 68 year old female with left breast cancer diagnosed 5-2018.  Pt had laine ascout x 2 placed 6-2018.  Pt was going to have surgery but decision to do chemotherapy prior to surgery.  Pt had chemotherapy from 5-2018- 10-25-18.  Now scheduled for  left breast lumpectomy post david / left breast sentinel lymph node biopsy on 11-28-18

## 2018-11-14 NOTE — H&P PST ADULT - PMH
Asthma  prior to gastric sleeve/ no meds  Breast cancer, left    Chronic fatigue syndrome  prior to gastric sleeve  Esophagitis    GERD (gastroesophageal reflux disease)  diet-controlled  Hiatal hernia    Malignant neoplasm of breast  left, diagnosed 4/2018  Migraine    Morbid obesity with BMI of 40.0-44.9, adult  s/p sleeve gastrectomy 2014  MVP (mitral valve prolapse)  recent echo 6/27/18  Shingles outbreak  left orbital area 5/7/14 Anemia  with chemotherapy  Asthma  prior to gastric sleeve/ no meds  Breast cancer, left  dx 5-2018  laine  6-2018 x 2 chemotherapy from 5-2018-10-25-18  Chronic fatigue syndrome  prior to gastric sleeve  Esophagitis    GERD (gastroesophageal reflux disease)  diet-controlled  Hiatal hernia  s/p repair with gastric sleeve  Leukocytosis  with chemotherapy  Malignant neoplasm of breast  left, diagnosed 4/2018  Migraine  last 5-2018  Morbid obesity with BMI of 40.0-44.9, adult  s/p sleeve gastrectomy 2017 pt lost 65 pounds  MVP (mitral valve prolapse)  recent echo 6/27/18  Shingles outbreak  left orbital area 5/7/14

## 2018-11-14 NOTE — H&P PST ADULT - PSH
H/O hernia repair  Tamiko, Cristal  History of sleeve gastrectomy  2017  S/P appendectomy  1967  S/P   x3 (, , )  S/P laparoscopic cholecystectomy  3/8/18 Breast cancer  mediport placed 7-18  H/O hernia repair  Tamiko, Cristal  History of sleeve gastrectomy  2017 with repair of hiatal hernia  S/P appendectomy  1967  S/P breast biopsy, left  left breast cancer   S/P   x3 (, , )  S/P laparoscopic cholecystectomy  3/8/18  S/P laparoscopy  repair of diaphragmatic hernia

## 2018-11-15 PROBLEM — C50.912 MALIGNANT NEOPLASM OF UNSPECIFIED SITE OF LEFT FEMALE BREAST: Chronic | Status: ACTIVE | Noted: 2018-07-11

## 2018-11-15 PROBLEM — J45.909 UNSPECIFIED ASTHMA, UNCOMPLICATED: Chronic | Status: ACTIVE | Noted: 2018-07-11

## 2018-11-15 PROBLEM — R53.82 CHRONIC FATIGUE, UNSPECIFIED: Chronic | Status: ACTIVE | Noted: 2018-07-11

## 2018-11-21 ENCOUNTER — APPOINTMENT (OUTPATIENT)
Dept: RADIATION ONCOLOGY | Facility: CLINIC | Age: 68
End: 2018-11-21
Payer: COMMERCIAL

## 2018-11-21 ENCOUNTER — OUTPATIENT (OUTPATIENT)
Dept: OUTPATIENT SERVICES | Facility: HOSPITAL | Age: 68
LOS: 1 days | Discharge: HOME | End: 2018-11-21
Payer: COMMERCIAL

## 2018-11-21 VITALS
RESPIRATION RATE: 16 BRPM | HEIGHT: 62 IN | DIASTOLIC BLOOD PRESSURE: 83 MMHG | OXYGEN SATURATION: 97 % | TEMPERATURE: 97.88 F | WEIGHT: 193.12 LBS | SYSTOLIC BLOOD PRESSURE: 148 MMHG | HEART RATE: 74 BPM | BODY MASS INDEX: 35.54 KG/M2

## 2018-11-21 DIAGNOSIS — C50.919 MALIGNANT NEOPLASM OF UNSPECIFIED SITE OF UNSPECIFIED FEMALE BREAST: Chronic | ICD-10-CM

## 2018-11-21 DIAGNOSIS — Z90.3 ACQUIRED ABSENCE OF STOMACH [PART OF]: Chronic | ICD-10-CM

## 2018-11-21 DIAGNOSIS — Z98.89 OTHER SPECIFIED POSTPROCEDURAL STATES: Chronic | ICD-10-CM

## 2018-11-21 DIAGNOSIS — Z90.49 ACQUIRED ABSENCE OF OTHER SPECIFIED PARTS OF DIGESTIVE TRACT: Chronic | ICD-10-CM

## 2018-11-21 DIAGNOSIS — Z98.890 OTHER SPECIFIED POSTPROCEDURAL STATES: Chronic | ICD-10-CM

## 2018-11-21 PROBLEM — D64.9 ANEMIA, UNSPECIFIED: Chronic | Status: ACTIVE | Noted: 2018-11-14

## 2018-11-21 PROBLEM — D72.829 ELEVATED WHITE BLOOD CELL COUNT, UNSPECIFIED: Chronic | Status: ACTIVE | Noted: 2018-11-14

## 2018-11-21 PROCEDURE — 99205 OFFICE O/P NEW HI 60 MIN: CPT | Mod: 25,GC

## 2018-11-21 NOTE — REASON FOR VISIT
[Consideration of Curative Therapy] : consideration of curative therapy for [Breast Cancer] : breast cancer [Spouse] : spouse

## 2018-11-21 NOTE — REVIEW OF SYSTEMS
[Negative] : Endocrine [Constipation: Grade 0] : Constipation: Grade 0 [Diarrhea: Grade 0] : Diarrhea: Grade 0 [Nausea: Grade 0] : Nausea: Grade 0 [Vomiting: Grade 0] : Vomiting: Grade 0 [Fatigue: Grade 1 - Fatigue relieved by rest] : Fatigue: Grade 1 - Fatigue relieved by rest [Breast Pain: Grade 1 - Mild pain] : Breast Pain: Grade 1 - Mild pain

## 2018-11-23 NOTE — PHYSICAL EXAM
[Breast Abnormal Lactation (Galactorrhea)] : no nipple discharge [No UE Edema] : there is no upper extremity edema [Sclera] : the sclera and conjunctiva were normal [Extraocular Movements] : extraocular movements were intact [Cervical Lymph Nodes Enlarged Posterior Bilaterally] : posterior cervical [Cervical Lymph Nodes Enlarged Anterior Bilaterally] : anterior cervical [Supraclavicular Lymph Nodes Enlarged Bilaterally] : supraclavicular [Axillary Lymph Nodes Enlarged Bilaterally] : axillary [Normal] : normal spine exam without palpable tenderness, no kyphosis or scoliosis [No Focal Deficits] : no focal deficits [de-identified] : mild induration at left upper slightly inner breast with no discrete palpable modules

## 2018-11-23 NOTE — HISTORY OF PRESENT ILLNESS
[FreeTextEntry1] : Ms. Colunga is a 68 years old female with clinical T2N0 left breast cancer, s/p neoadjuvant chemotherapy.  She is scheduled for left breast lumpectomy / sentinel node biopsy.\par \par She noticed left breast pain / burning sensation and felt lump in April 2018.  Bilateral mammogram and ultrasound on 4/27/18 showed a 2.4 X 1.6 cm lobulated mass in the upper inner left breast, corresponding to the area of palpable concern.  Sonogram identified a 2.2 X 1.9 X 1.7 cm  heterogeneous ill-defined mass at 11:00, which correlates with the left upper inner quadrant lobulated mammographic mass.   She had left breast 11:00 biopsy on 5/8/2018 which showed high grade malignant neoplasm that was JYOTSNA 3 positive, ER negative, OK 3% positive, and Her2-madhuri negative, and Ki 67 of 70%.\par Bilateral breast MRI on 5/18/18 showed 2.o cm mass at 11:00 with surrounding non-mass enhancement at 11:00.  Located 1.7 cm posterior and lateral to the index mass in the left breast, there was a 5 mm enhancing nodule which is suspicious for a satellite lesion. MRI guided biopsy of the left breast upper outer lesion on 6/12/18 showed low nuclear grade DCIS cribriform pattern, ER >98% and OK 80%. \par \par  She saw Dr Rossi who referred her for neoadjuvant chemotherapy prior to breast conservation surgery. She had [employer] genetic testing that did not show any actionable mutations. Under the care of Dr. Hussein, patient received 4 cycles of dose dense AC from 7/12/18 to 8/23/18.  She then started Paclitaxel on 9/6/18 and completed treatment on 10/25/18.  She tolerated chemotherapy well with good clinical response.  Of note, followup breast sonogram on 9/6/18 revealed decrease in the size of the left breast 11:00 mass to 1.3 cm (from 2.9 cm in June).\par \par Patient is scheduled for left breast partial mastectomy on 11.28/18 by Dr. Rossi.  She present here today for further discussion about the role of radiation therapy. She denies breast pain, nipple discharge and lymphedema. \par \par

## 2018-11-23 NOTE — OB/GYN HISTORY
[Definite:  ___ (Date)] : the last menstrual period was [unfilled] [History of Birth Control Pills] : Patient has a history of taking birth control pills [Menopause Age: ____] : patient was [unfilled] years old at menopause [___] : Living: [unfilled] [Spotting Between  Menses] : no spotting between menses [History of Hormone Replacement Therapy] : no history of hormone replacement therapy [Currently In Menopause] : not currently in menopause [Experiencing Menopausal Sxs] : not experiencing menopausal symptoms

## 2018-11-23 NOTE — VITALS
[Least Pain Intensity: 0/10] : 0/10 [90: Able to carry normal activity; minor signs or symptoms of disease.] : 90: Able to carry normal activity; minor signs or symptoms of disease.  [ECOG Performance Status: 1 - Restricted in physically strenuous activity but ambulatory and able to carry out work of a light or sedentary nature] : Performance Status: 1 - Restricted in physically strenuous activity but ambulatory and able to carry out work of a light or sedentary nature, e.g., light house work, office work [Maximal Pain Intensity: 1/10] : 1/10

## 2018-11-27 ENCOUNTER — TRANSCRIPTION ENCOUNTER (OUTPATIENT)
Age: 68
End: 2018-11-27

## 2018-11-28 ENCOUNTER — RESULT REVIEW (OUTPATIENT)
Age: 68
End: 2018-11-28

## 2018-11-28 ENCOUNTER — APPOINTMENT (OUTPATIENT)
Dept: NUCLEAR MEDICINE | Facility: HOSPITAL | Age: 68
End: 2018-11-28

## 2018-11-28 ENCOUNTER — OUTPATIENT (OUTPATIENT)
Dept: OUTPATIENT SERVICES | Facility: HOSPITAL | Age: 68
LOS: 1 days | End: 2018-11-28
Payer: COMMERCIAL

## 2018-11-28 VITALS
HEART RATE: 68 BPM | TEMPERATURE: 97 F | OXYGEN SATURATION: 97 % | RESPIRATION RATE: 16 BRPM | SYSTOLIC BLOOD PRESSURE: 114 MMHG | DIASTOLIC BLOOD PRESSURE: 64 MMHG

## 2018-11-28 VITALS
TEMPERATURE: 98 F | HEIGHT: 62 IN | HEART RATE: 79 BPM | SYSTOLIC BLOOD PRESSURE: 121 MMHG | WEIGHT: 192.02 LBS | RESPIRATION RATE: 18 BRPM | DIASTOLIC BLOOD PRESSURE: 78 MMHG | OXYGEN SATURATION: 98 %

## 2018-11-28 DIAGNOSIS — Z98.89 OTHER SPECIFIED POSTPROCEDURAL STATES: Chronic | ICD-10-CM

## 2018-11-28 DIAGNOSIS — C50.919 MALIGNANT NEOPLASM OF UNSPECIFIED SITE OF UNSPECIFIED FEMALE BREAST: Chronic | ICD-10-CM

## 2018-11-28 DIAGNOSIS — Z98.890 OTHER SPECIFIED POSTPROCEDURAL STATES: Chronic | ICD-10-CM

## 2018-11-28 DIAGNOSIS — Z90.3 ACQUIRED ABSENCE OF STOMACH [PART OF]: Chronic | ICD-10-CM

## 2018-11-28 DIAGNOSIS — D48.62 NEOPLASM OF UNCERTAIN BEHAVIOR OF LEFT BREAST: ICD-10-CM

## 2018-11-28 DIAGNOSIS — Z90.49 ACQUIRED ABSENCE OF OTHER SPECIFIED PARTS OF DIGESTIVE TRACT: Chronic | ICD-10-CM

## 2018-11-28 DIAGNOSIS — D05.12 INTRADUCTAL CARCINOMA IN SITU OF LEFT BREAST: ICD-10-CM

## 2018-11-28 PROCEDURE — 88305 TISSUE EXAM BY PATHOLOGIST: CPT

## 2018-11-28 PROCEDURE — 88307 TISSUE EXAM BY PATHOLOGIST: CPT | Mod: 26

## 2018-11-28 PROCEDURE — 38525 BIOPSY/REMOVAL LYMPH NODES: CPT | Mod: LT

## 2018-11-28 PROCEDURE — 76098 X-RAY EXAM SURGICAL SPECIMEN: CPT

## 2018-11-28 PROCEDURE — 76098 X-RAY EXAM SURGICAL SPECIMEN: CPT | Mod: 26

## 2018-11-28 PROCEDURE — 38792 RA TRACER ID OF SENTINL NODE: CPT | Mod: LT

## 2018-11-28 PROCEDURE — A9541: CPT

## 2018-11-28 PROCEDURE — 14001 TIS TRNFR TRUNK 10.1-30SQCM: CPT

## 2018-11-28 PROCEDURE — 19301 PARTIAL MASTECTOMY: CPT | Mod: LT

## 2018-11-28 PROCEDURE — 88307 TISSUE EXAM BY PATHOLOGIST: CPT

## 2018-11-28 PROCEDURE — 88305 TISSUE EXAM BY PATHOLOGIST: CPT | Mod: 26

## 2018-11-28 RX ORDER — CELECOXIB 200 MG/1
200 CAPSULE ORAL ONCE
Qty: 0 | Refills: 0 | Status: COMPLETED | OUTPATIENT
Start: 2018-11-28 | End: 2018-11-28

## 2018-11-28 RX ORDER — OXYCODONE HYDROCHLORIDE 5 MG/1
10 TABLET ORAL ONCE
Qty: 0 | Refills: 0 | Status: DISCONTINUED | OUTPATIENT
Start: 2018-11-28 | End: 2018-11-28

## 2018-11-28 RX ORDER — APREPITANT 80 MG/1
40 CAPSULE ORAL ONCE
Qty: 0 | Refills: 0 | Status: COMPLETED | OUTPATIENT
Start: 2018-11-28 | End: 2018-11-28

## 2018-11-28 RX ORDER — SODIUM CHLORIDE 9 MG/ML
1000 INJECTION, SOLUTION INTRAVENOUS
Qty: 0 | Refills: 0 | Status: DISCONTINUED | OUTPATIENT
Start: 2018-11-28 | End: 2018-12-13

## 2018-11-28 RX ORDER — OXYCODONE HYDROCHLORIDE 5 MG/1
5 TABLET ORAL ONCE
Qty: 0 | Refills: 0 | Status: DISCONTINUED | OUTPATIENT
Start: 2018-11-28 | End: 2018-11-28

## 2018-11-28 RX ORDER — CEFAZOLIN SODIUM 1 G
2000 VIAL (EA) INJECTION ONCE
Qty: 0 | Refills: 0 | Status: COMPLETED | OUTPATIENT
Start: 2018-11-28 | End: 2018-11-28

## 2018-11-28 RX ORDER — CELECOXIB 200 MG/1
200 CAPSULE ORAL ONCE
Qty: 0 | Refills: 0 | Status: DISCONTINUED | OUTPATIENT
Start: 2018-11-28 | End: 2018-12-13

## 2018-11-28 RX ORDER — ACETAMINOPHEN WITH CODEINE 300MG-30MG
1 TABLET ORAL
Qty: 20 | Refills: 0
Start: 2018-11-28 | End: 2018-12-02

## 2018-11-28 RX ORDER — ONDANSETRON 8 MG/1
4 TABLET, FILM COATED ORAL ONCE
Qty: 0 | Refills: 0 | Status: DISCONTINUED | OUTPATIENT
Start: 2018-11-28 | End: 2018-12-13

## 2018-11-28 RX ORDER — FAMOTIDINE 10 MG/ML
20 INJECTION INTRAVENOUS ONCE
Qty: 0 | Refills: 0 | Status: COMPLETED | OUTPATIENT
Start: 2018-11-28 | End: 2018-11-28

## 2018-11-28 RX ORDER — ACETAMINOPHEN 500 MG
1000 TABLET ORAL ONCE
Qty: 0 | Refills: 0 | Status: COMPLETED | OUTPATIENT
Start: 2018-11-28 | End: 2018-11-28

## 2018-11-28 RX ADMIN — Medication 1000 MILLIGRAM(S): at 10:05

## 2018-11-28 RX ADMIN — APREPITANT 40 MILLIGRAM(S): 80 CAPSULE ORAL at 10:06

## 2018-11-28 RX ADMIN — FAMOTIDINE 20 MILLIGRAM(S): 10 INJECTION INTRAVENOUS at 10:05

## 2018-11-28 RX ADMIN — CELECOXIB 200 MILLIGRAM(S): 200 CAPSULE ORAL at 10:05

## 2018-11-28 NOTE — BRIEF OPERATIVE NOTE - SPECIMENS
left axillary sentinal lymph nodes, left breast cancer 2:00, posterior margin 2:00 left breast, left breast cancer 11:00, medial margin 11:00 left breast

## 2018-11-28 NOTE — PRE-ANESTHESIA EVALUATION ADULT - NSANTHPMHFT_GEN_ALL_CORE
Asthma like condition before gastric sleeve 1 year ago, no more problems, lost weight  GERD with PRN omeprazole last time took 2 weeks . Hiatal hernia repaired 1 year ago with the gastric surgery

## 2018-11-28 NOTE — BRIEF OPERATIVE NOTE - PROCEDURE
<<-----Click on this checkbox to enter Procedure Lumpectomy of right breast with sentinel node biopsy  11/28/2018  Esthela  localization  Active  [id number]

## 2018-11-28 NOTE — ASU PATIENT PROFILE, ADULT - PMH
Anemia  with chemotherapy  Asthma  prior to gastric sleeve/ no meds  Breast cancer, left  dx 5-2018  laine  6-2018 x 2 chemotherapy from 5-2018-10-25-18  Chronic fatigue syndrome  prior to gastric sleeve  Esophagitis    GERD (gastroesophageal reflux disease)  diet-controlled  Hiatal hernia  s/p repair with gastric sleeve  Leukocytosis  with chemotherapy  Malignant neoplasm of breast  left, diagnosed 4/2018  Migraine  last 5-2018  Morbid obesity with BMI of 40.0-44.9, adult  s/p sleeve gastrectomy 2017 pt lost 65 pounds  MVP (mitral valve prolapse)  recent echo 6/27/18  Shingles outbreak  left orbital area 5/7/14

## 2018-11-28 NOTE — ASU DISCHARGE PLAN (ADULT/PEDIATRIC). - SPECIAL INSTRUCTIONS
Please keep the wound sites dry for 24 hours. You may shower tomorrow. Wear surgical bra as instructed.

## 2018-11-28 NOTE — ASU PATIENT PROFILE, ADULT - PSH
Breast cancer  mediport placed 7-18  H/O hernia repair  Tamiko, Cristal  History of sleeve gastrectomy  2017 with repair of hiatal hernia  S/P appendectomy  1967  S/P breast biopsy, left  left breast cancer   S/P   x3 (, , )  S/P laparoscopic cholecystectomy  3/8/18  S/P laparoscopy  repair of diaphragmatic hernia

## 2018-11-30 NOTE — HISTORY OF PRESENT ILLNESS
[Disease: _____________________] : Disease: [unfilled] [AJCC Stage: ____] : AJCC Stage: [unfilled] [de-identified] : Age 67: left breast cancer \par She had noticed left breast pain: burning sensation and felt lump. She had left breast 11:00 biopsy in 4/2018 which showed high grade malignant neoplasm that was JYOTSNA 3 positive, ER negative, MD 3%, and Kwy8zab negative Ki 67 of 70%. She had MRI of the breast that showed satellite lesion in the left upper outer quadrant which showed DCIS cribiform pattern with low grade nuclear atypia that was ER >98% and MD 80%. Since her initial mammogram and MRI, she feels the breast mass is growing larger and more tender. She saw Dr Rossi who has referred her for neoadjuvant chemotherapy prior to breast conservation surgery. She had [employer] genetic testing that did not show any actionable mutations. She consented to dose dense ACT neoadjuvant chemotherapy and started on 7/12/18. She tolerated AC well but developed presyncopal event and neutropenia Grade 3 after 1st cycle of paclitaxel.  [de-identified] : ER negative, AZ 70%, Mpe1eic negative  [de-identified] : Genetic testing: [employer]: no actionable mutations \par dose dense AC 7/12/18 to 8/23/18 \par dose dense paclitaxel: 9/6/18 -10/25\par PLANNED LUMPECTOMY  11/28/18- Dr. Rosis [de-identified] : She reports that she is feeling better since Wednesday\par She has  had some weight loss with continued taste dysgeusia and dry mouth. No new painful mouth sores.  \par Reduced appetite \par Denies abdominal pain/cramping/diarrhea \par GERD resolved\par Alternating Bms but prior IBS pattern prior to chemo. \par +Hemorrhoid . Denies rectal bleeding. \par Arthralgias. +Tramadol use at bedtime only\par Denies fevers, chills, SOB, OSMAN, Chest pain\par Denies urinary issues. \par Denies any breast changes, pain.  \par Denies inappropriate bleeding\par Denies any current neuropathic complaints to the distal extremities\par Denies falls or trauma. \par She has seen Dr. Stoddard and will have lumpectomy. \par She saw Dr. Rossi and will have lumpectomy 11/28/18 m pre surg clearance on 11/14

## 2018-11-30 NOTE — PHYSICAL EXAM
[Restricted in physically strenuous activity but ambulatory and able to carry out work of a light or sedentary nature] : Status 1- Restricted in physically strenuous activity but ambulatory and able to carry out work of a light or sedentary nature, e.g., light house work, office work [Normal] : affect appropriate [de-identified] : right cw mediport CDI   [de-identified] : nonpalpable left breast mass

## 2018-11-30 NOTE — ASSESSMENT
[Curative] : Goals of care discussed with patient: Curative [FreeTextEntry1] : 67 y/o  BRCA negative F with left clinical Stage II to III breast cancer that is ER negative, OR 3%, and Lar5qug negative. She completed AC x 4 Taxol X 4 10.25.18. She has resolving fatigue and myalgias that is improving and better tolerated with hydration and rest.. She has resolved mouth sores and dermatitis with no current complaints of neuropathy.  She has followed with  Dr Rossi for surgical options and she will be pursing lumpectomy.  She was made aware of need for complete tumor resection and subsequent radiation that would follow lumpectomy.  She has been referred to radiation oncology and has given her consent for contact.  She will proceed with pre surgical testing 11.14 and lumpectomy 11.28.18.   Her counts are stable. Next follow up in December.

## 2018-11-30 NOTE — REASON FOR VISIT
[Follow-Up Visit] : a follow-up [Spouse] : spouse [FreeTextEntry2] : follow up for paclitaxel s/p Cycle 4

## 2018-11-30 NOTE — REVIEW OF SYSTEMS
[Negative] : Allergic/Immunologic [Joint Pain] : joint pain [Fever] : no fever [Chills] : no chills [Night Sweats] : no night sweats [Fatigue] : no fatigue [Recent Change In Weight] : ~T no recent weight change [Joint Stiffness] : no joint stiffness [Muscle Pain] : no muscle pain [Muscle Weakness] : no muscle weakness [Confused] : no confusion [Dizziness] : no dizziness [Fainting] : no fainting [Difficulty Walking] : no difficulty walking [FreeTextEntry2] : Improving fatigue  [de-identified] : numbness over fingers

## 2018-12-06 LAB — SURGICAL PATHOLOGY STUDY: SIGNIFICANT CHANGE UP

## 2018-12-06 NOTE — H&P PST ADULT - AS BP NONINV METHOD
Patient Instructions by Shawn Curtis MD at 08/02/17 05:20 PM     Author:  Shawn Curtis MD Service:  (none) Author Type:  Physician     Filed:  08/02/17 05:20 PM Encounter Date:  8/2/2017 Status:  Signed     :  Shawn Curtis MD (Physician)              PATIENT INFORMATION  Follow-Up  - Return for your yearly well child visit.    7-8 years old Health and Safety Tips - The following hyperlinks are available to access via MyChart    [employer] Futures 7-8 Years Old Parent Education  [employer] Futures 7-8 Years Old Child Education    Futuros Brillantes 7 a 8 años de edad (Educación para los padres) - Español  Futuros [employer]illantes 7 a 8 años de edad (Educación para los niños) - Español    Additional Educational Resources:  For additional resources regarding your symptoms, diagnosis, or further health information, please visit the Health Resources section on [email or web address] or the Online Health Resources section in [employer].      Revision History        User Key Date/Time User Provider Type Action    > [N/A] 08/02/17 05:20 PM Shawn Curtis MD Physician Sign             electronic

## 2018-12-14 ENCOUNTER — OUTPATIENT (OUTPATIENT)
Dept: OUTPATIENT SERVICES | Facility: HOSPITAL | Age: 68
LOS: 1 days | Discharge: HOME | End: 2018-12-14

## 2018-12-14 DIAGNOSIS — C50.919 MALIGNANT NEOPLASM OF UNSPECIFIED SITE OF UNSPECIFIED FEMALE BREAST: Chronic | ICD-10-CM

## 2018-12-14 DIAGNOSIS — Z98.890 OTHER SPECIFIED POSTPROCEDURAL STATES: Chronic | ICD-10-CM

## 2018-12-14 DIAGNOSIS — C50.919 MALIGNANT NEOPLASM OF UNSPECIFIED SITE OF UNSPECIFIED FEMALE BREAST: ICD-10-CM

## 2018-12-14 DIAGNOSIS — Z98.89 OTHER SPECIFIED POSTPROCEDURAL STATES: Chronic | ICD-10-CM

## 2018-12-14 DIAGNOSIS — Z90.49 ACQUIRED ABSENCE OF OTHER SPECIFIED PARTS OF DIGESTIVE TRACT: Chronic | ICD-10-CM

## 2018-12-14 DIAGNOSIS — Z90.3 ACQUIRED ABSENCE OF STOMACH [PART OF]: Chronic | ICD-10-CM

## 2018-12-19 ENCOUNTER — RESULT REVIEW (OUTPATIENT)
Age: 68
End: 2018-12-19

## 2018-12-19 ENCOUNTER — APPOINTMENT (OUTPATIENT)
Dept: INFUSION THERAPY | Facility: HOSPITAL | Age: 68
End: 2018-12-19

## 2018-12-19 ENCOUNTER — LABORATORY RESULT (OUTPATIENT)
Age: 68
End: 2018-12-19

## 2018-12-19 ENCOUNTER — APPOINTMENT (OUTPATIENT)
Dept: HEMATOLOGY ONCOLOGY | Facility: CLINIC | Age: 68
End: 2018-12-19
Payer: COMMERCIAL

## 2018-12-19 VITALS
DIASTOLIC BLOOD PRESSURE: 83 MMHG | TEMPERATURE: 209.48 F | OXYGEN SATURATION: 99 % | SYSTOLIC BLOOD PRESSURE: 124 MMHG | HEART RATE: 75 BPM | RESPIRATION RATE: 16 BRPM | BODY MASS INDEX: 35.67 KG/M2 | WEIGHT: 195 LBS

## 2018-12-19 DIAGNOSIS — Z79.899 OTHER LONG TERM (CURRENT) DRUG THERAPY: ICD-10-CM

## 2018-12-19 LAB
HCT VFR BLD CALC: 34.3 % — LOW (ref 34.5–45)
HGB BLD-MCNC: 11.6 G/DL — SIGNIFICANT CHANGE UP (ref 11.5–15.5)
MCHC RBC-ENTMCNC: 30.1 PG — SIGNIFICANT CHANGE UP (ref 27–34)
MCHC RBC-ENTMCNC: 33.8 G/DL — SIGNIFICANT CHANGE UP (ref 32–36)
MCV RBC AUTO: 88.9 FL — SIGNIFICANT CHANGE UP (ref 80–100)
PLATELET # BLD AUTO: 308 K/UL — SIGNIFICANT CHANGE UP (ref 150–400)
RBC # BLD: 3.85 M/UL — SIGNIFICANT CHANGE UP (ref 3.8–5.2)
RBC # FLD: 14.3 % — SIGNIFICANT CHANGE UP (ref 10.3–14.5)
WBC # BLD: 4.7 K/UL — SIGNIFICANT CHANGE UP (ref 3.8–10.5)
WBC # FLD AUTO: 4.7 K/UL — SIGNIFICANT CHANGE UP (ref 3.8–10.5)

## 2018-12-19 PROCEDURE — 99215 OFFICE O/P EST HI 40 MIN: CPT

## 2018-12-19 RX ORDER — TRAMADOL HYDROCHLORIDE 50 MG/1
50 TABLET, COATED ORAL
Qty: 20 | Refills: 0 | Status: DISCONTINUED | COMMUNITY
Start: 2018-10-11 | End: 2018-12-19

## 2018-12-19 NOTE — REASON FOR VISIT
[Follow-Up Visit] : a follow-up [Spouse] : spouse [FreeTextEntry2] : follow up Breast Cancer s/p lumpectomy with sentinel lymph node biopsy

## 2018-12-19 NOTE — REVIEW OF SYSTEMS
[Joint Pain] : joint pain [Negative] : Constitutional [Fever] : no fever [Chills] : no chills [Night Sweats] : no night sweats [Fatigue] : no fatigue [Recent Change In Weight] : ~T no recent weight change [Joint Stiffness] : no joint stiffness [Muscle Pain] : no muscle pain [Muscle Weakness] : no muscle weakness [Confused] : no confusion [Dizziness] : no dizziness [Fainting] : no fainting [Difficulty Walking] : no difficulty walking [FreeTextEntry2] : Improving fatigue  [de-identified] : numbness over feet and unsteady after getting up

## 2018-12-19 NOTE — CONSULT LETTER
[Dear  ___] : Dear  [unfilled], [Courtesy Letter:] : I had the pleasure of seeing your patient, [unfilled], in my office today. [Please see my note below.] : Please see my note below. [Consult Closing:] : Thank you very much for allowing me to participate in the care of this patient.  If you have any questions, please do not hesitate to contact me. [Sincerely,] : Sincerely, [DrCassie  ___] : Dr. ANDERSON [FreeTextEntry2] : Sara Rossi MD\par [address]\[place] [address] [FreeTextEntry3] : Arian Hussein MD\par Attending\par [facility]\par  [DrCassie ___] : Dr. ANDERSON

## 2018-12-19 NOTE — ASSESSMENT
[Curative] : Goals of care discussed with patient: Curative [FreeTextEntry1] : 67 y/o  BRCA negative F with left clinical Stage II to III breast cancer that is ER negative, OR 3%, and Whw9jjc negative. She completed AC x 4 Taxol X 4 10/25/18. She had pathologic CR with her triple negative breast cancer. We reviewed her residual feet neuropathy and adaptations/ supportive care to prevent falls. We reviewed breast cancer survivorship and follow up every 3 months for the first 2 years. We reviewed port maintenance every 6 to 8 weeks. We reviewed signs or symptoms of breast cancer recurrence. We reviewed low fat diet and exercise and 2 clinical trials that are available at [facility]: NIH sponsored aspirin study and weight control study: cancer prevention trials. Information about both trials were given to her. She understands she does not have to participate in clinical trials but is showing interest. Questions answered to her satisfaction. Next follow up in 6 weeks. 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

## 2018-12-19 NOTE — DISCUSSION/SUMMARY
[Cancer Type / Location / Histology Subtype: ________] : Cancer Type / Location / Histology Subtype: [unfilled] [Diagnosis Date (year): ____] : Diagnosis Date (year): [unfilled] [Surgery] : Surgery: Yes [Follow up with Oncologist in _____] : Follow up with Oncologist in [unfilled] [Follow up with Surgeon in _____] : Follow up with Surgeon in [unfilled] [Primary care physician] : primary care physician [Colonoscopy] : colonoscopy [Mammogram] : Mammogram [Lung screening] : lung screening [PAP Test] : PAP test [Emotional and mental health] : Emotional and mental health [Fatigue] : Fatigue [Bridge to Survivorship Breast Cancer] : Bridge to Survivorship Breast Cancer [Surgery Date(s) (year): ____] : Surgery Date(s) (year): [unfilled] [Body Area Treated: _________] : Body Area Treated: [unfilled] [Persistent symptoms or side effects at completion of treatment?  If Yes, list types ___] : Persistent symptoms or side effects at completion of treatment? Yes, [unfilled] [Follow up with Radiation MD in _____] : Follow up with Radiation MD in [unfilled] [II] : II [Surgical Procedure / Location / Findings: _________] : Surgical Procedure / Location / Findings: [unfilled] [FreeTextEntry1] : Dose Dense  Adriamycin, Cytoxan + Taxol  07/12/218/- 10/25/18 [FreeTextEntry2] : Radiation  [FreeTextEntry8] : Marci DE LOS SANTOS  [FreeTextEntry9] :  12/19/18

## 2018-12-19 NOTE — HISTORY OF PRESENT ILLNESS
[Disease: _____________________] : Disease: [unfilled] [AJCC Stage: ____] : AJCC Stage: [unfilled] [de-identified] : Age 67: left breast cancer \par She had noticed left breast pain: burning sensation and felt lump. She had left breast 11:00 biopsy in 4/2018 which showed high grade malignant neoplasm that was JYOTSNA 3 positive, ER negative, VA 3%, and Xbw2rvy negative Ki 67 of 70%. She had MRI of the breast that showed satellite lesion in the left upper outer quadrant which showed DCIS cribiform pattern with low grade nuclear atypia that was ER >98% and VA 80%. Since her initial mammogram and MRI, she feels the breast mass is growing larger and more tender. She saw Dr Rossi who has referred her for neoadjuvant chemotherapy prior to breast conservation surgery. She had [employer] genetic testing that did not show any actionable mutations. She consented to dose dense ACT neoadjuvant chemotherapy and started on 7/12/18. She tolerated AC well but developed presyncopal event and neutropenia Grade 3 after 1st cycle of paclitaxel. She completed neoadjuvant chemotherapy 10/25/18. On 11/28/18, she underwent left lumpectomy with sentinel lymph node biopsy with Dr Rossi. The pathology showed no residual invasive carcinoma, biopsy changes, and negative 7 lymph nodes.  [de-identified] : ER negative, ID 70%, Gbw9ftn negative  [de-identified] : Genetic testing: [facility]: no actionable mutations \par dose dense AC 7/12/18 to 8/23/18 \par dose dense paclitaxel: 9/6/18 -10/25\par 11/28/18- Dr. Rossi lumpectomy and sentinel lymph node biopsy  [de-identified] : She completed left breast lumpectomy with Dr. Rossi with pathological CR. She has seen Dr. Patel with Radiation Oncology for consultation for post lumpectomy RT. She has mild tenderness at the surgical site. She denies any further dizziness episodes or falls. She still feels her feet are a bit clumsy when getting up. She denies any pain over the feet or legs. She is eating normally. \par

## 2018-12-19 NOTE — PHYSICAL EXAM
[Restricted in physically strenuous activity but ambulatory and able to carry out work of a light or sedentary nature] : Status 1- Restricted in physically strenuous activity but ambulatory and able to carry out work of a light or sedentary nature, e.g., light house work, office work [Normal] : affect appropriate [de-identified] : right cw mediport CDI   [de-identified] : lumpectomy and sentinel lymph node site healed

## 2018-12-20 PROCEDURE — 77263 THER RADIOLOGY TX PLNG CPLX: CPT

## 2018-12-28 ENCOUNTER — APPOINTMENT (OUTPATIENT)
Dept: RADIATION ONCOLOGY | Facility: CLINIC | Age: 68
End: 2018-12-28
Payer: COMMERCIAL

## 2018-12-28 VITALS
WEIGHT: 196.21 LBS | DIASTOLIC BLOOD PRESSURE: 87 MMHG | TEMPERATURE: 97.52 F | BODY MASS INDEX: 35.89 KG/M2 | SYSTOLIC BLOOD PRESSURE: 155 MMHG | RESPIRATION RATE: 16 BRPM | HEART RATE: 91 BPM | OXYGEN SATURATION: 98 %

## 2018-12-28 PROCEDURE — 99213 OFFICE O/P EST LOW 20 MIN: CPT | Mod: 25

## 2018-12-28 PROCEDURE — 77290 THER RAD SIMULAJ FIELD CPLX: CPT | Mod: 26

## 2018-12-28 PROCEDURE — 77334 RADIATION TREATMENT AID(S): CPT | Mod: 26

## 2018-12-28 NOTE — REVIEW OF SYSTEMS
[Negative] : Psychiatric [Fatigue: Grade 0] : Fatigue: Grade 0 [Breast Pain: Grade 0] : Breast Pain: Grade 0

## 2018-12-31 NOTE — PHYSICAL EXAM
[Normal] : supple with no thyromegaly or masses appreciated [Breast Palpation Mass] : no palpable masses [Breast Abnormal Lactation (Galactorrhea)] : no nipple discharge [No UE Edema] : there is no upper extremity edema [Cervical Lymph Nodes Enlarged Posterior Bilaterally] : posterior cervical [Cervical Lymph Nodes Enlarged Anterior Bilaterally] : anterior cervical [Supraclavicular Lymph Nodes Enlarged Bilaterally] : supraclavicular [Axillary Lymph Nodes Enlarged Bilaterally] : axillary [No Focal Deficits] : no focal deficits [de-identified] : recently healed lumpectomy circumareolar scar  in the upper outer aspect and left sentinel node biopsy scar

## 2018-12-31 NOTE — HISTORY OF PRESENT ILLNESS
[FreeTextEntry1] : Patient is a 68 year old female with clinical T2N0 left breast cancer.  She had a left breast 11:00 biopsy which showed a high grade neoplasm ER negative, NV 3%, and Her-2 negative.  Breast MRI identified a left upper outer lesion which showed low nuclear grade ER/NV positive DCIS on biopsy.  She received dose dense AC and weekly paclitaxel and completed chemotherapy on 10/25/18.  She was noted to have excellent clinical and radiographic response.\par \par Patient underwent left breast lumpectomy (2 sites) with sentinel lymph node biopsy with Dr. Rossi on 11/28/18. Surgical pathology of the left breast 11:0 partial mastectomy  showed no residual invasive cancer.  The left breast 2:00 excision showed no residual DCIS.  Margins were negative.  [place] node biopsy showed that all 7 lymph nodes were negative. \par \par Patient present here today for further discussion about the role of radiation therapy. She denies breast pain, nipple discharge or lymphedema.  She followed up with medical oncology and will be considered for AI for the DCIS after radiation threrapy.\par

## 2019-01-08 ENCOUNTER — NON-APPOINTMENT (OUTPATIENT)
Age: 69
End: 2019-01-08

## 2019-01-08 ENCOUNTER — APPOINTMENT (OUTPATIENT)
Dept: PULMONOLOGY | Facility: CLINIC | Age: 69
End: 2019-01-08
Payer: COMMERCIAL

## 2019-01-08 VITALS
DIASTOLIC BLOOD PRESSURE: 80 MMHG | OXYGEN SATURATION: 97 % | SYSTOLIC BLOOD PRESSURE: 120 MMHG | BODY MASS INDEX: 36.62 KG/M2 | RESPIRATION RATE: 17 BRPM | HEART RATE: 70 BPM | HEIGHT: 62 IN | WEIGHT: 199 LBS

## 2019-01-08 DIAGNOSIS — K21.9 GASTRO-ESOPHAGEAL REFLUX DISEASE W/OUT ESOPHAGITIS: ICD-10-CM

## 2019-01-08 PROCEDURE — G0008: CPT

## 2019-01-08 PROCEDURE — 99214 OFFICE O/P EST MOD 30 MIN: CPT | Mod: 25

## 2019-01-08 PROCEDURE — 94010 BREATHING CAPACITY TEST: CPT

## 2019-01-08 PROCEDURE — 90662 IIV NO PRSV INCREASED AG IM: CPT

## 2019-01-08 PROCEDURE — 95012 NITRIC OXIDE EXP GAS DETER: CPT

## 2019-01-08 NOTE — PROCEDURE
[FreeTextEntry1] : PFT-spi reveals normal flows with FEV1 of  2.31    , which is   106% of predicted, normal flow volume loop \par \par FENO was 14; a normal value being less than 25\par Fractional exhaled nitric oxide (FENO) is regarded as a simple, noninvasive method for assessing eosinophilic airway inflammation. Produced by a variety of cells within the lung, nitric oxide (NO) concentrations are generally low in healthy individuals. However, high concentrations of NO appear to be involved in nonspecific host defense mechanisms and chronic inflammatory diseases such as asthma. The American Thoracic Society (ATS) therefore has recommended using FENO to aid in the diagnosis and monitoring of eosinophilic airway inflammation and asthma, and for identifying steroid responsive individuals whose chronic respiratory symptoms may be caused by airway inflammation.

## 2019-01-08 NOTE — HISTORY OF PRESENT ILLNESS
[FreeTextEntry1] : Ms. Colunga is a 67 year old female presenting to the office today with a history of allergic rhinitis, asthma, chronic fatigue, reflux, hiatal hernia, overweight, snoring, SOB and nocturnal leg cramps. Her chief complaint is health maintenance\par -she states she would like the flu shot today\par -she states her energy level is higher \par - she notes her energy levels are good \par -she notes she has mild joint pains \par -she states she has watery eyes\par -she states her bowels are regular \par -she states she has no SOB\par -she states her weight is stable\par -she notes her diet is good \par -she states she occasionally has reflux at night\par -she notes she doesn’t take any medication for the reflux\par -she states she gets up 1x/night to use the bathroom\par -she states she is taking biotin, vitamin D, and Calcium, but no new medications\par -she denies any headaches, nausea, vomiting, fever, chills, sweats, chest pain, chest pressure, diarrhea, constipation, dysphagia, dizziness, leg swelling, leg pain, itchy eyes, itchy ears, heartburn, reflux, or sour taste in the mouth.

## 2019-01-08 NOTE — ADDENDUM
[FreeTextEntry1] : Documented by Elina Cabrera acting as a scribe for Dr. Андрей Rashid on 01/08/2019.\par \par All medical record entries made by the Scribe were at my, Dr. Андрей Rashid's, direction and personally dictated by me on 01/08/2019. I have reviewed the chart and agree that the record accurately reflects my personal performance of the history, physical exam, assessment and plan. I have also personally directed, reviewed, and agree with the discharge instructions.

## 2019-01-08 NOTE — REASON FOR VISIT
[Follow-Up] : a follow-up visit [FreeTextEntry1] : allergic rhinitis, asthma, chronic fatigue, reflux, hiatal hernia, overweight, snoring, SOB and nocturnal leg cramps

## 2019-01-08 NOTE — PHYSICAL EXAM
[General Appearance - Well Developed] : well developed [Normal Appearance] : normal appearance [Well Groomed] : well groomed [General Appearance - Well Nourished] : well nourished [No Deformities] : no deformities [General Appearance - In No Acute Distress] : no acute distress [Normal Conjunctiva] : the conjunctiva exhibited no abnormalities [Eyelids - No Xanthelasma] : the eyelids demonstrated no xanthelasmas [Normal Oropharynx] : normal oropharynx [II] : II [Neck Appearance] : the appearance of the neck was normal [Neck Cervical Mass (___cm)] : no neck mass was observed [Jugular Venous Distention Increased] : there was no jugular-venous distention [Thyroid Diffuse Enlargement] : the thyroid was not enlarged [Thyroid Nodule] : there were no palpable thyroid nodules [Heart Rate And Rhythm] : heart rate and rhythm were normal [Heart Sounds] : normal S1 and S2 [Murmurs] : no murmurs present [Respiration, Rhythm And Depth] : normal respiratory rhythm and effort [Exaggerated Use Of Accessory Muscles For Inspiration] : no accessory muscle use [Auscultation Breath Sounds / Voice Sounds] : lungs were clear to auscultation bilaterally [Abdomen Soft] : soft [Abdomen Tenderness] : non-tender [Abdomen Mass (___ Cm)] : no abdominal mass palpated [Abnormal Walk] : normal gait [Gait - Sufficient For Exercise Testing] : the gait was sufficient for exercise testing [Nail Clubbing] : no clubbing of the fingernails [Cyanosis, Localized] : no localized cyanosis [Petechial Hemorrhages (___cm)] : no petechial hemorrhages [Skin Color & Pigmentation] : normal skin color and pigmentation [] : no rash [No Venous Stasis] : no venous stasis [Skin Lesions] : no skin lesions [No Skin Ulcers] : no skin ulcer [No Xanthoma] : no  xanthoma was observed [Deep Tendon Reflexes (DTR)] : deep tendon reflexes were 2+ and symmetric [Sensation] : the sensory exam was normal to light touch and pinprick [No Focal Deficits] : no focal deficits [Oriented To Time, Place, And Person] : oriented to person, place, and time [Impaired Insight] : insight and judgment were intact [Affect] : the affect was normal [FreeTextEntry1] : right sided chest Catherization port

## 2019-01-08 NOTE — ASSESSMENT
[FreeTextEntry1] : Ms. Colunga is a 68 year old female with a history of asthma, allergy, GERD, awaiting RT. She doing well from a pulmonary perspective she is currently on chemotherapy for breast cancer. Her shortness of breath is stable, though multifactorial due to:\par -mild asthma\par -poor breathing mechanics\par -obesity\par -underlying cardiac disease\par \par problem 1: mild asthma\par -continue to use ProAir PRN and before exercise\par -Asthma is believed to be caused by inherited (genetic) and environmental factor, but its exact cause is unknown. Asthma may be triggered by allergens, lung infections, or irritants in the air. Asthma triggers are different for each person \par -Inhaler technique reviewed as well as oral hygiene techniques reviewed with patient. Avoidance of cold air, extremes of temperature, rescue inhaler should be used before exercise. Order of medication reviewed with patient. Recommended use of a cool mist humidifier in the bedroom. \par \par problem 2: allergies and sinuses\par -continue to use Zaditor eye drops\par -continue to use OTC antihistamine PRN \par -Environmental measures for allergies were encouraged including mattress and pillow cover, air purifier, and environmental controls.\par \par problem 3: GERD\par -diet controlled, prior on Omeprazole and Zantac \par -Rule of 2s: avoid eating too much, eating too late, eating too spicy, eating two hours before bed\par -Things to avoid including overeating, spicy foods, tight clothing, eating within three hours of bed, this list is not all inclusive. \par -For treatment of reflux, possible options discussed including diet control, H2 blockers, PPIs, as well as coating motility agents discussed as treatment options. Timing of meals and proximity of last meal to sleep were discussed. If symptoms persist, a formal gastrointestinal evaluation is needed. \par \par problem 4: low vitamin d\par -continue to use vitamin D supplementation\par -Has been associated with asthma exacerbations and increased allergic symptoms. The goal based on recent information is maintaining levels between 50-70 and low normal is 30. Recommended 50,000 units every two weeks to once a month depending on the level. \par \par problem 5: breast cancer\par -s/p chemotherapy; await pending RT Rx\par \par problem 6: obesity\par -s/p gastric sleeve surgery \par -improved \par -Weight loss, exercise, and diet control were discussed and are highly encouraged. Treatment options were given such as, aqua therapy, and contacting a nutritionist. Recommended to use the elliptical, stationary bike, less use of treadmill. Obesity is associated with worsening asthma, shortness of breath, and potential for cardiac disease, diabetes, and other underlying medical conditions. \par \par problem 7: nocturia\par -she is being recommended to avoid drinking water 3 hours before bed \par \par problem 8: snoring\par -resolved with weight loss \par \par \par problem 9: health maintenance \par -s/p influenza vaccine (01.08.2019)\par -recommended strep pneumonia vaccines: Prevnar-13 vaccine (refused in past), followed by Pneumo vaccine 23 one year following\par -recommended early intervention for URIs\par -recommended regular osteoporosis evaluations\par -recommended early dermatological evaluations\par -recommended after the age of 50 to the age of 70, colonoscopy every 5 years \par \par F/u in 6 months\par She is encouraged to call with any changes, concerns, or questions. \par

## 2019-01-09 DIAGNOSIS — Z23 ENCOUNTER FOR IMMUNIZATION: ICD-10-CM

## 2019-01-09 PROCEDURE — 77334 RADIATION TREATMENT AID(S): CPT | Mod: 26

## 2019-01-09 PROCEDURE — 77295 3-D RADIOTHERAPY PLAN: CPT | Mod: 26

## 2019-01-09 PROCEDURE — 77300 RADIATION THERAPY DOSE PLAN: CPT | Mod: 26

## 2019-01-17 PROCEDURE — 77280 THER RAD SIMULAJ FIELD SMPL: CPT | Mod: 26

## 2019-01-18 PROCEDURE — 77387C: CUSTOM

## 2019-01-22 PROCEDURE — 77387C: CUSTOM

## 2019-01-23 PROCEDURE — 77387C: CUSTOM

## 2019-01-24 PROCEDURE — 77387C: CUSTOM

## 2019-01-25 PROCEDURE — 77427 RADIATION TX MANAGEMENT X5: CPT

## 2019-01-25 PROCEDURE — 77387C: CUSTOM

## 2019-01-27 NOTE — REVIEW OF SYSTEMS
[Fatigue: Grade 0] : Fatigue: Grade 0 [Breast Pain: Grade 0] : Breast Pain: Grade 0 [Constipation: Grade 0] : Constipation: Grade 0 [Diarrhea: Grade 0] : Diarrhea: Grade 0 [Dysphagia: Grade 0] : Dysphagia: Grade 0 [Dyspnea: Grade 0] : Dyspnea: Grade 0 [Negative] : Constitutional [Dermatitis Radiation: Grade 1 - Faint erythema or dry desquamation] : Dermatitis Radiation: Grade 1 - Faint erythema or dry desquamation [Skin Rash] : no skin rash

## 2019-01-27 NOTE — PHYSICAL EXAM
[Breast Palpation Mass] : no palpable masses [Breast Abnormal Lactation (Galactorrhea)] : no nipple discharge [No UE Edema] : there is no upper extremity edema [Cervical Lymph Nodes Enlarged Posterior Bilaterally] : posterior cervical [Cervical Lymph Nodes Enlarged Anterior Bilaterally] : anterior cervical [Supraclavicular Lymph Nodes Enlarged Bilaterally] : supraclavicular [Axillary Lymph Nodes Enlarged Bilaterally] : axillary [No Focal Deficits] : no focal deficits [Normal] : well developed, well nourished, in no acute distress [] : no respiratory distress [Oriented To Time, Place, And Person] : oriented to person, place, and time [de-identified] : healed surgical incisions left breast and axilla; minimal erythema of treated breast [de-identified] : mild erythema

## 2019-01-27 NOTE — HISTORY OF PRESENT ILLNESS
[FreeTextEntry1] : 1/22/19-OTV\par Pt. has received 530/5240cGy to the left breast.\par Today she states that she feels well with good energy level. Appetite good. Using aquaphor. Denies breast or chest pain.\par \par \par

## 2019-01-28 PROCEDURE — 77387C: CUSTOM

## 2019-01-29 PROCEDURE — 77387C: CUSTOM

## 2019-01-31 PROCEDURE — 77387C: CUSTOM

## 2019-02-01 PROCEDURE — 77387C: CUSTOM

## 2019-02-04 ENCOUNTER — OUTPATIENT (OUTPATIENT)
Dept: OUTPATIENT SERVICES | Facility: HOSPITAL | Age: 69
LOS: 1 days | Discharge: HOME | End: 2019-02-04

## 2019-02-04 DIAGNOSIS — Z98.89 OTHER SPECIFIED POSTPROCEDURAL STATES: Chronic | ICD-10-CM

## 2019-02-04 DIAGNOSIS — C50.919 MALIGNANT NEOPLASM OF UNSPECIFIED SITE OF UNSPECIFIED FEMALE BREAST: ICD-10-CM

## 2019-02-04 DIAGNOSIS — Z90.49 ACQUIRED ABSENCE OF OTHER SPECIFIED PARTS OF DIGESTIVE TRACT: Chronic | ICD-10-CM

## 2019-02-04 DIAGNOSIS — Z98.890 OTHER SPECIFIED POSTPROCEDURAL STATES: Chronic | ICD-10-CM

## 2019-02-04 DIAGNOSIS — C50.919 MALIGNANT NEOPLASM OF UNSPECIFIED SITE OF UNSPECIFIED FEMALE BREAST: Chronic | ICD-10-CM

## 2019-02-04 DIAGNOSIS — Z90.3 ACQUIRED ABSENCE OF STOMACH [PART OF]: Chronic | ICD-10-CM

## 2019-02-04 PROCEDURE — 77427 RADIATION TX MANAGEMENT X5: CPT

## 2019-02-04 PROCEDURE — 77387C: CUSTOM

## 2019-02-04 NOTE — PHYSICAL EXAM
[Breast Palpation Mass] : no palpable masses [Breast Abnormal Lactation (Galactorrhea)] : no nipple discharge [de-identified] : mild erythema of the treated breast

## 2019-02-04 NOTE — HISTORY OF PRESENT ILLNESS
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 \par \par 1/28/19-OTV-Pt. has received 1590/5240 cGy to the left breast.\par Today she states she is feeling well. Very mild redness to left breast, uses aquaphor twice daily. Denies pain, SOB, Fatigue.\par \par \par 1/22/19-OTV\par Pt. has received 530/5240cGy to the left breast.\par Today she states that she feels well with good energy level. Appetite good. Using aquaphor. Denies breast or chest pain.\par \par \par

## 2019-02-04 NOTE — REVIEW OF SYSTEMS
[Nausea: Grade 0] : Nausea: Grade 0 [Negative] : Heme/Lymph [Skin Hyperpigmentation: Grade 0] : Skin Hyperpigmentation: Grade 0 [Skin Induration: Grade 0] : Skin Induration: Grade 0 [Dermatitis Radiation: Grade 1 - Faint erythema or dry desquamation] : Dermatitis Radiation: Grade 1 - Faint erythema or dry desquamation [Skin Rash] : no skin rash

## 2019-02-04 NOTE — DISEASE MANAGEMENT
[TTNM] : 2 [NTNM] : 0 [MTNM] : 0 [de-identified] : 3574 [de-identified] : 3286 [de-identified] : left breast

## 2019-02-05 PROCEDURE — 77387C: CUSTOM

## 2019-02-05 PROCEDURE — 77280 THER RAD SIMULAJ FIELD SMPL: CPT | Mod: 26

## 2019-02-06 PROCEDURE — 77387C: CUSTOM

## 2019-02-07 PROCEDURE — 77387C: CUSTOM

## 2019-02-08 PROCEDURE — 77387C: CUSTOM

## 2019-02-09 NOTE — PHYSICAL EXAM
[Breast Palpation Mass] : no palpable masses [Breast Abnormal Lactation (Galactorrhea)] : no nipple discharge [de-identified] : mild skin reaction /erythema of treated breast, seroma stable

## 2019-02-09 NOTE — DISEASE MANAGEMENT
[Clinical] : TNM Stage: c [II] : II [TTNM] : 2 [NTNM] : 0 [MTNM] : 0 [de-identified] : 7541 [de-identified] : 1518 [de-identified] : left breast

## 2019-02-09 NOTE — REVIEW OF SYSTEMS
[Constipation: Grade 0] : Constipation: Grade 0 [Diarrhea: Grade 0] : Diarrhea: Grade 0 [Dysphagia: Grade 0] : Dysphagia: Grade 0 [Nausea: Grade 0] : Nausea: Grade 0 [Fatigue: Grade 0] : Fatigue: Grade 0 [Breast Pain: Grade 0] : Breast Pain: Grade 0 [Dyspnea: Grade 0] : Dyspnea: Grade 0 [Negative] : Heme/Lymph [Skin Atrophy: Grade 0] : Skin Atrophy: Grade 0 [Skin Hyperpigmentation: Grade 0] : Skin Hyperpigmentation: Grade 0 [Skin Induration: Grade 0] : Skin Induration: Grade 0 [Dermatitis Radiation: Grade 1 - Faint erythema or dry desquamation] : Dermatitis Radiation: Grade 1 - Faint erythema or dry desquamation [Skin Rash] : no skin rash

## 2019-02-09 NOTE — HISTORY OF PRESENT ILLNESS
[FreeTextEntry1] : Patient is a 68 year old female with clinical T2N0 triple negative high grade neoplasm and low nuclear grade ER/[place] positive DCIS of the left breast, s/p neoadjuvant chemotherapy with complete pathological response. \par \par \par 2/4/19--OTV- Pt. has received 2650/5240cGy to the left breast.\par Today she states she is feeling well. Slightly pink skin to the left breast. Using aquaphor. Intermittent shooting pains. No other compaints.\par \par \par 1/28/19-OTV-Pt. has received 1590/5240cGy to the left breast.\par Today she states she is feeling well. Very mild redness to left breast, uses aquaphor twice daily. Denies pain, SOB, Fatigue.\par \par \par 1/22/19-OTV\par Pt. has received 530/5240cGy to the left breast.\par Today she states that she feels well with good energy level. Appetite good. Using aquaphor. Denies breast or chest pain.\par \par \par

## 2019-02-11 PROCEDURE — 77427 RADIATION TX MANAGEMENT X5: CPT

## 2019-02-11 PROCEDURE — 77387C: CUSTOM

## 2019-02-12 PROCEDURE — 77387C: CUSTOM

## 2019-02-12 NOTE — DISEASE MANAGEMENT
[Clinical] : TNM Stage: c [II] : II [TTNM] : 2 [NTNM] : 0 [MTNM] : 0 [de-identified] : 3952 [de-identified] : 2106 [de-identified] : left breast

## 2019-02-12 NOTE — REVIEW OF SYSTEMS
[Constipation: Grade 0] : Constipation: Grade 0 [Diarrhea: Grade 0] : Diarrhea: Grade 0 [Dysphagia: Grade 0] : Dysphagia: Grade 0 [Dyspnea: Grade 0] : Dyspnea: Grade 0 [Skin Hyperpigmentation: Grade 0] : Skin Hyperpigmentation: Grade 0 [Dermatitis Radiation: Grade 1 - Faint erythema or dry desquamation] : Dermatitis Radiation: Grade 1 - Faint erythema or dry desquamation [Fatigue: Grade 1 - Fatigue relieved by rest] : Fatigue: Grade 1 - Fatigue relieved by rest [Breast Pain: Grade 1 - Mild pain] : Breast Pain: Grade 1 - Mild pain [Skin Induration: Grade 0] : Skin Induration: Grade 0

## 2019-02-12 NOTE — HISTORY OF PRESENT ILLNESS
[FreeTextEntry1] : Patient is a 68 year old female with clinical T2N0 triple negative high grade neoplasm and low nuclear grade ER/CA positive DCIS of the left breast, s/p neoadjuvant chemotherapy with complete pathological response. \par \par 2/11/19- OTV- Pt. has received 3975/5240 cGy to the left breast.\par Today she notes mild fatigue relieved with rest and intermittent shooting pains in left breast. Notes erythema to left breast for which she is using aquaphor.\par \par \par 2/4/19--OTV- Pt. has received 2650/5240cGy to the left breast.\par Today she states she is feeling well. Slightly pink skin to the left breast. Using aquaphor. Intermittent shooting pains. No other complaints.\par \par 1/28/19-OTV-Pt. has received 1590/5240cGy to the left breast.\par Today she states she is feeling well. Very mild redness to left breast, uses aquaphor twice daily. Denies pain, SOB, Fatigue.\par \par \par 1/22/19-OTV\par Pt. has received 530/5240cGy to the left breast.\par Today she states that she feels well with good energy level. Appetite good. Using aquaphor. Denies breast or chest pain.\par \par \par

## 2019-02-12 NOTE — PHYSICAL EXAM
[Breast Palpation Mass] : no palpable masses [Breast Abnormal Lactation (Galactorrhea)] : no nipple discharge [Normal] : well developed, well nourished, in no acute distress [Oriented To Time, Place, And Person] : oriented to person, place, and time [de-identified] : mild erythema of left  breast [de-identified] : mild erythema / rash of left  breast

## 2019-02-13 ENCOUNTER — APPOINTMENT (OUTPATIENT)
Dept: HEMATOLOGY ONCOLOGY | Facility: CLINIC | Age: 69
End: 2019-02-13
Payer: COMMERCIAL

## 2019-02-13 ENCOUNTER — APPOINTMENT (OUTPATIENT)
Dept: INFUSION THERAPY | Facility: HOSPITAL | Age: 69
End: 2019-02-13

## 2019-02-13 ENCOUNTER — LABORATORY RESULT (OUTPATIENT)
Age: 69
End: 2019-02-13

## 2019-02-13 ENCOUNTER — RESULT REVIEW (OUTPATIENT)
Age: 69
End: 2019-02-13

## 2019-02-13 VITALS
BODY MASS INDEX: 36.61 KG/M2 | DIASTOLIC BLOOD PRESSURE: 86 MMHG | SYSTOLIC BLOOD PRESSURE: 134 MMHG | HEART RATE: 66 BPM | RESPIRATION RATE: 16 BRPM | TEMPERATURE: 98.5 F | OXYGEN SATURATION: 100 % | WEIGHT: 200.18 LBS

## 2019-02-13 DIAGNOSIS — Z87.898 PERSONAL HISTORY OF OTHER SPECIFIED CONDITIONS: ICD-10-CM

## 2019-02-13 DIAGNOSIS — Z87.39 PERSONAL HISTORY OF OTHER DISEASES OF THE MUSCULOSKELETAL SYSTEM AND CONNECTIVE TISSUE: ICD-10-CM

## 2019-02-13 LAB
HCT VFR BLD CALC: 35.3 % — SIGNIFICANT CHANGE UP (ref 34.5–45)
HGB BLD-MCNC: 12 G/DL — SIGNIFICANT CHANGE UP (ref 11.5–15.5)
MCHC RBC-ENTMCNC: 30 PG — SIGNIFICANT CHANGE UP (ref 27–34)
MCHC RBC-ENTMCNC: 34 G/DL — SIGNIFICANT CHANGE UP (ref 32–36)
MCV RBC AUTO: 88.2 FL — SIGNIFICANT CHANGE UP (ref 80–100)
PLATELET # BLD AUTO: 288 K/UL — SIGNIFICANT CHANGE UP (ref 150–400)
RBC # BLD: 4 M/UL — SIGNIFICANT CHANGE UP (ref 3.8–5.2)
RBC # FLD: 12.7 % — SIGNIFICANT CHANGE UP (ref 10.3–14.5)
WBC # BLD: 4.4 K/UL — SIGNIFICANT CHANGE UP (ref 3.8–10.5)
WBC # FLD AUTO: 4.4 K/UL — SIGNIFICANT CHANGE UP (ref 3.8–10.5)

## 2019-02-13 PROCEDURE — 77387C: CUSTOM

## 2019-02-13 PROCEDURE — 99215 OFFICE O/P EST HI 40 MIN: CPT

## 2019-02-13 RX ORDER — TRAMADOL HYDROCHLORIDE 50 MG/1
50 TABLET, COATED ORAL
Qty: 30 | Refills: 0 | Status: DISCONTINUED | COMMUNITY
Start: 2018-09-12 | End: 2019-02-13

## 2019-02-13 RX ORDER — PROCHLORPERAZINE MALEATE 10 MG/1
10 TABLET ORAL EVERY 6 HOURS
Qty: 30 | Refills: 1 | Status: DISCONTINUED | COMMUNITY
Start: 2018-06-21 | End: 2019-02-13

## 2019-02-14 PROBLEM — Z87.39 HISTORY OF JOINT PAIN: Status: RESOLVED | Noted: 2018-09-12 | Resolved: 2019-02-14

## 2019-02-14 PROCEDURE — 77387C: CUSTOM

## 2019-02-14 NOTE — PHYSICAL EXAM
[Restricted in physically strenuous activity but ambulatory and able to carry out work of a light or sedentary nature] : Status 1- Restricted in physically strenuous activity but ambulatory and able to carry out work of a light or sedentary nature, e.g., light house work, office work [Normal] : affect appropriate [de-identified] : right cw mediport CDI   [de-identified] : lumpectomy and sentinel lymph node site healed

## 2019-02-14 NOTE — ASSESSMENT
[Curative] : Goals of care discussed with patient: Curative [FreeTextEntry1] : 69 y/o  BRCA negative F with left clinical Stage II to III breast cancer that is ER negative, MS 3%, and Dve1mmg negative. She completed AC x 4 Taxol X 4 10/25/18. She had pathologic CR with her triple negative breast cancer. She is completing RT to the breast and we reviewed consideration of endocrine therapy. We explained the ER negative; MS positive subtype and also reviewed DCIS. We would consider endocrine therapy but if there was any intolerance to therapy we would not continue therapy since she had low MS positivity although DCIS was ER/ MS positive. We reviewed anastrozole once a day for up to 5 years. We reviewed alternatives if any intolerance. We reviewed potential side effects including but not limited to: hot flash, GI upset, weight gain, and fatigue. Written information was given to her to review. She will review with next port flush. She is willing to flush port every 6 to 8 weeks. \par \par Be Well Study: we reviewed the nutrition study and the rationale for the study. She understands this is optional and she does not have to be part of study. She is willing to consider and written consent given to her. She will follow up in 6 weeks.

## 2019-02-14 NOTE — REVIEW OF SYSTEMS
[Joint Pain] : joint pain [Negative] : Allergic/Immunologic [Fever] : no fever [Chills] : no chills [Night Sweats] : no night sweats [Fatigue] : no fatigue [Recent Change In Weight] : ~T no recent weight change [Joint Stiffness] : no joint stiffness [Muscle Pain] : no muscle pain [Muscle Weakness] : no muscle weakness [Confused] : no confusion [Dizziness] : no dizziness [Fainting] : no fainting [Difficulty Walking] : no difficulty walking [FreeTextEntry2] : Improving fatigue  [de-identified] : numbness over feet and unsteady after getting up

## 2019-02-14 NOTE — CONSULT LETTER
[Dear  ___] : Dear  [unfilled], [Courtesy Letter:] : I had the pleasure of seeing your patient, [unfilled], in my office today. [Please see my note below.] : Please see my note below. [Consult Closing:] : Thank you very much for allowing me to participate in the care of this patient.  If you have any questions, please do not hesitate to contact me. [Sincerely,] : Sincerely, [DrCassie  ___] : Dr. ANDERSON [DrCassie ___] : Dr. ANDERSON [FreeTextEntry2] : Sara Rossi MD\par [address]\[place] [address] [FreeTextEntry3] : Arian Hussein MD\par Attending\par [facility]\par

## 2019-02-15 PROCEDURE — 77387C: CUSTOM

## 2019-02-18 NOTE — REVIEW OF SYSTEMS
[Fatigue: Grade 1 - Fatigue relieved by rest] : Fatigue: Grade 1 - Fatigue relieved by rest [Skin Hyperpigmentation: Grade 0] : Skin Hyperpigmentation: Grade 0 [Skin Induration: Grade 0] : Skin Induration: Grade 0 [Dermatitis Radiation: Grade 1 - Faint erythema or dry desquamation] : Dermatitis Radiation: Grade 1 - Faint erythema or dry desquamation

## 2019-02-19 PROCEDURE — 77427 RADIATION TX MANAGEMENT X5: CPT

## 2019-02-22 NOTE — HISTORY OF PRESENT ILLNESS
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 \par \par 2/19/19 OTV - 5240/5240cGy\par Today she states that she feels generally well with good energy level. Notes occasional fatigue which is relieved with rest. Denies breast or chest pain. \par \par \par \par \par \par \par \par \par 2/11/19- OTV- Pt. has received 3975/5240 cGy to the left breast.\par Today she notes mild fatigue relieved with rest and intermittent shooting pains in left breast. Notes erythema to left breast for which she is using aquaphor.\par \par \par 2/4/19--OTV- Pt. has received 2650/5240cGy to the left breast.\par Today she states she is feeling well. Slightly pink skin to the left breast. Using aquaphor. Intermittent shooting pains. No other complaints.\par \par 1/28/19-OTV-Pt. has received 1590/5240cGy to the left breast.\par Today she states she is feeling well. Very mild redness to left breast, uses aquaphor twice daily. Denies pain, SOB, Fatigue.\par \par \par 1/22/19-OTV\par Pt. has received 530/5240cGy to the left breast.\par Today she states that she feels well with good energy level. Appetite good. Using aquaphor. Denies breast or chest pain.\par \par \par

## 2019-02-22 NOTE — PHYSICAL EXAM
[Normal] : well developed, well nourished, in no acute distress [Breast Palpation Mass] : no palpable masses [Breast Abnormal Lactation (Galactorrhea)] : no nipple discharge [Oriented To Time, Place, And Person] : oriented to person, place, and time [de-identified] : mild erythema of left  breast

## 2019-03-02 ENCOUNTER — TRANSCRIPTION ENCOUNTER (OUTPATIENT)
Age: 69
End: 2019-03-02

## 2019-03-12 ENCOUNTER — RESULT REVIEW (OUTPATIENT)
Age: 69
End: 2019-03-12

## 2019-03-20 ENCOUNTER — APPOINTMENT (OUTPATIENT)
Dept: RADIATION ONCOLOGY | Facility: CLINIC | Age: 69
End: 2019-03-20
Payer: COMMERCIAL

## 2019-03-20 VITALS
RESPIRATION RATE: 16 BRPM | OXYGEN SATURATION: 97 % | HEART RATE: 59 BPM | BODY MASS INDEX: 36.45 KG/M2 | DIASTOLIC BLOOD PRESSURE: 83 MMHG | WEIGHT: 199.29 LBS | SYSTOLIC BLOOD PRESSURE: 144 MMHG

## 2019-03-20 PROCEDURE — 99024 POSTOP FOLLOW-UP VISIT: CPT

## 2019-03-20 NOTE — VITALS
[Least Pain Intensity: 0/10] : 0/10 [90: Able to carry normal activity; minor signs or symptoms of disease.] : 90: Able to carry normal activity; minor signs or symptoms of disease.  [ECOG Performance Status: 1 - Restricted in physically strenuous activity but ambulatory and able to carry out work of a light or sedentary nature] : Performance Status: 1 - Restricted in physically strenuous activity but ambulatory and able to carry out work of a light or sedentary nature, e.g., light house work, office work [Pain Description/Quality: ___] : Pain description/quality: [unfilled] [Maximal Pain Intensity: 4/10] : 4/10 [Pain Duration: ___] : Pain duration: [unfilled] [Pain Location: ___] : Pain Location: [unfilled]

## 2019-03-20 NOTE — REVIEW OF SYSTEMS
[Skin Induration: Grade 0] : Skin Induration: Grade 0 [Negative] : Respiratory [Fatigue: Grade 0] : Fatigue: Grade 0 [Skin Hyperpigmentation: Grade 0] : Skin Hyperpigmentation: Grade 0 [Dermatitis Radiation: Grade 1 - Faint erythema or dry desquamation] : Dermatitis Radiation: Grade 1 - Faint erythema or dry desquamation

## 2019-04-02 ENCOUNTER — OUTPATIENT (OUTPATIENT)
Dept: OUTPATIENT SERVICES | Facility: HOSPITAL | Age: 69
LOS: 1 days | Discharge: HOME | End: 2019-04-02

## 2019-04-02 DIAGNOSIS — Z98.890 OTHER SPECIFIED POSTPROCEDURAL STATES: Chronic | ICD-10-CM

## 2019-04-02 DIAGNOSIS — C50.919 MALIGNANT NEOPLASM OF UNSPECIFIED SITE OF UNSPECIFIED FEMALE BREAST: ICD-10-CM

## 2019-04-02 DIAGNOSIS — Z90.49 ACQUIRED ABSENCE OF OTHER SPECIFIED PARTS OF DIGESTIVE TRACT: Chronic | ICD-10-CM

## 2019-04-02 DIAGNOSIS — Z98.89 OTHER SPECIFIED POSTPROCEDURAL STATES: Chronic | ICD-10-CM

## 2019-04-02 DIAGNOSIS — C50.919 MALIGNANT NEOPLASM OF UNSPECIFIED SITE OF UNSPECIFIED FEMALE BREAST: Chronic | ICD-10-CM

## 2019-04-02 DIAGNOSIS — Z90.3 ACQUIRED ABSENCE OF STOMACH [PART OF]: Chronic | ICD-10-CM

## 2019-04-12 ENCOUNTER — APPOINTMENT (OUTPATIENT)
Dept: INFUSION THERAPY | Facility: HOSPITAL | Age: 69
End: 2019-04-12

## 2019-04-12 ENCOUNTER — RESULT REVIEW (OUTPATIENT)
Age: 69
End: 2019-04-12

## 2019-04-12 ENCOUNTER — LABORATORY RESULT (OUTPATIENT)
Age: 69
End: 2019-04-12

## 2019-04-12 ENCOUNTER — APPOINTMENT (OUTPATIENT)
Dept: HEMATOLOGY ONCOLOGY | Facility: CLINIC | Age: 69
End: 2019-04-12
Payer: COMMERCIAL

## 2019-04-12 VITALS
DIASTOLIC BLOOD PRESSURE: 76 MMHG | RESPIRATION RATE: 16 BRPM | OXYGEN SATURATION: 96 % | BODY MASS INDEX: 35.97 KG/M2 | HEART RATE: 67 BPM | SYSTOLIC BLOOD PRESSURE: 115 MMHG | WEIGHT: 196.65 LBS | TEMPERATURE: 98.7 F

## 2019-04-12 LAB
BASOPHILS # BLD AUTO: 0 K/UL — SIGNIFICANT CHANGE UP (ref 0–0.2)
BASOPHILS NFR BLD AUTO: 0.5 % — SIGNIFICANT CHANGE UP (ref 0–2)
EOSINOPHIL # BLD AUTO: 0.1 K/UL — SIGNIFICANT CHANGE UP (ref 0–0.5)
EOSINOPHIL NFR BLD AUTO: 2.4 % — SIGNIFICANT CHANGE UP (ref 0–6)
HCT VFR BLD CALC: 33.8 % — LOW (ref 34.5–45)
HGB BLD-MCNC: 11.8 G/DL — SIGNIFICANT CHANGE UP (ref 11.5–15.5)
LYMPHOCYTES # BLD AUTO: 0.7 K/UL — LOW (ref 1–3.3)
LYMPHOCYTES # BLD AUTO: 16.6 % — SIGNIFICANT CHANGE UP (ref 13–44)
MCHC RBC-ENTMCNC: 31.3 PG — SIGNIFICANT CHANGE UP (ref 27–34)
MCHC RBC-ENTMCNC: 34.8 G/DL — SIGNIFICANT CHANGE UP (ref 32–36)
MCV RBC AUTO: 90 FL — SIGNIFICANT CHANGE UP (ref 80–100)
MONOCYTES # BLD AUTO: 0.5 K/UL — SIGNIFICANT CHANGE UP (ref 0–0.9)
MONOCYTES NFR BLD AUTO: 12.1 % — SIGNIFICANT CHANGE UP (ref 2–14)
NEUTROPHILS # BLD AUTO: 3.1 K/UL — SIGNIFICANT CHANGE UP (ref 1.8–7.4)
NEUTROPHILS NFR BLD AUTO: 68.4 % — SIGNIFICANT CHANGE UP (ref 43–77)
PLATELET # BLD AUTO: 296 K/UL — SIGNIFICANT CHANGE UP (ref 150–400)
RBC # BLD: 3.76 M/UL — LOW (ref 3.8–5.2)
RBC # FLD: 13 % — SIGNIFICANT CHANGE UP (ref 10.3–14.5)
WBC # BLD: 4.5 K/UL — SIGNIFICANT CHANGE UP (ref 3.8–10.5)
WBC # FLD AUTO: 4.5 K/UL — SIGNIFICANT CHANGE UP (ref 3.8–10.5)

## 2019-04-12 PROCEDURE — 99214 OFFICE O/P EST MOD 30 MIN: CPT

## 2019-04-12 NOTE — HISTORY OF PRESENT ILLNESS
[FreeTextEntry1] : Ms. Colunga is a 68 years old female with clinical T2N0 left breast DCIS. ER negative, OH 3%, and Her-2 negative s/p neoadjuvant chemotherapy completed on 10/25/18 with complete pathological response. s/p left breast Lumpectomy with SLN biopsy.\par She received Radiation Therapy to a total of 5240 cGy from 1/18/19 -2/19/19, did not develop any grade 3 or higher RT toxicity.\par \par She presents for Post treatment evaluation. She had seroma drain on 3/11/19 by Dr Rossi. The lump is still hard, dry, no drainage noted. She is using Aquaphor and another cream cant remember name for redness. Brief intermittent shooting pains reported. Denies fatigue. T She will seen  Dr Hussein in April.

## 2019-04-12 NOTE — PHYSICAL EXAM
[Normal] : well developed, well nourished, in no acute distress [Breast Palpation Mass] : no palpable masses [Breast Abnormal Lactation (Galactorrhea)] : no nipple discharge [Oriented To Time, Place, And Person] : oriented to person, place, and time [de-identified] : residual mild erythema of left  breast

## 2019-04-20 NOTE — PHYSICAL EXAM
[Fully active, able to carry on all pre-disease performance without restriction] : Status 0 - Fully active, able to carry on all pre-disease performance without restriction [Normal] : affect appropriate [de-identified] : right cw mediport CDI   [de-identified] : lumpectomy and sentinel lymph node scar; mild erythema over L breast

## 2019-04-20 NOTE — REASON FOR VISIT
[Follow-Up Visit] : a follow-up [FreeTextEntry2] : follow up for breast cancer s/p completion of RT

## 2019-04-20 NOTE — HISTORY OF PRESENT ILLNESS
[Disease: _____________________] : Disease: [unfilled] [de-identified] : Age 67: left breast cancer \par She had noticed left breast pain: burning sensation and felt lump. She had left breast 11:00 biopsy in 4/2018 which showed high grade malignant neoplasm that was JYOTSNA 3 positive, ER negative, NH 3%, and Axh2krk negative Ki 67 of 70%. She had MRI of the breast that showed satellite lesion in the left upper outer quadrant which showed DCIS cribiform pattern with low grade nuclear atypia that was ER >98% and NH 80%. Since her initial mammogram and MRI, she feels the breast mass is growing larger and more tender. She saw Dr Rossi who has referred her for neoadjuvant chemotherapy prior to breast conservation surgery. She had [facility] genetic testing that did not show any actionable mutations. She consented to dose dense ACT neoadjuvant chemotherapy and started on 7/12/18. She tolerated AC well but developed presyncopal event and neutropenia Grade 3 after 1st cycle of paclitaxel. She completed neoadjuvant chemotherapy 10/25/18. On 11/28/18, she underwent left lumpectomy with sentinel lymph node biopsy with Dr Rossi. The pathology showed no residual invasive carcinoma, biopsy changes, and negative 7 lymph nodes.  [AJCC Stage: ____] : AJCC Stage: [unfilled] [de-identified] : ER negative, TX 70%, Ekh0sia negative  [de-identified] : Genetic testing: [facility]: no actionable mutations \par dose dense AC 7/12/18 to 8/23/18 \par dose dense paclitaxel: 9/6/18 -10/25\par 11/28/18- Dr. Rossi lumpectomy and sentinel lymph node biopsy \par RT Dr Patel: 2/2019 [de-identified] : She denies any pain or tenderness over radiation site. She continues to apply Aquaphor over the skin. She feels her energy is returned. She denies any port site tenderness and willing to have port flush.

## 2019-04-20 NOTE — REVIEW OF SYSTEMS
[Skin Rash] : no skin rash [Skin Wound] : no skin wound [de-identified] : mild redness over site of RT  [Negative] : Allergic/Immunologic

## 2019-04-20 NOTE — ASSESSMENT
[FreeTextEntry1] : 69 y/o  BRCA negative F with left clinical Stage II to III breast cancer that is ER negative, WA 3%, and Cyi5zgz negative. She completed AC x 4 Taxol X 4 10/25/18. She had pathologic CR with her triple negative breast cancer. She completed RT to the breast and we reviewed the role of anastrozole therapy for prior DCIS that was ER/ WA positive and breast cancer that was WA positive. We reviewed pros and cons of endocrine therapy and survivorship. She is willing to try anastrozole. We reviewed Be Well study and consented to study. Next follow up in 2 months.

## 2019-05-08 ENCOUNTER — APPOINTMENT (OUTPATIENT)
Dept: ULTRASOUND IMAGING | Facility: IMAGING CENTER | Age: 69
End: 2019-05-08
Payer: COMMERCIAL

## 2019-05-08 ENCOUNTER — OUTPATIENT (OUTPATIENT)
Dept: OUTPATIENT SERVICES | Facility: HOSPITAL | Age: 69
LOS: 1 days | End: 2019-05-08
Payer: COMMERCIAL

## 2019-05-08 ENCOUNTER — APPOINTMENT (OUTPATIENT)
Dept: MAMMOGRAPHY | Facility: IMAGING CENTER | Age: 69
End: 2019-05-08
Payer: COMMERCIAL

## 2019-05-08 DIAGNOSIS — Z98.890 OTHER SPECIFIED POSTPROCEDURAL STATES: Chronic | ICD-10-CM

## 2019-05-08 DIAGNOSIS — Z90.3 ACQUIRED ABSENCE OF STOMACH [PART OF]: Chronic | ICD-10-CM

## 2019-05-08 DIAGNOSIS — Z90.49 ACQUIRED ABSENCE OF OTHER SPECIFIED PARTS OF DIGESTIVE TRACT: Chronic | ICD-10-CM

## 2019-05-08 DIAGNOSIS — Z98.89 OTHER SPECIFIED POSTPROCEDURAL STATES: Chronic | ICD-10-CM

## 2019-05-08 DIAGNOSIS — Z00.8 ENCOUNTER FOR OTHER GENERAL EXAMINATION: ICD-10-CM

## 2019-05-08 DIAGNOSIS — C50.919 MALIGNANT NEOPLASM OF UNSPECIFIED SITE OF UNSPECIFIED FEMALE BREAST: Chronic | ICD-10-CM

## 2019-05-08 PROCEDURE — G0279: CPT | Mod: 26

## 2019-05-08 PROCEDURE — 77066 DX MAMMO INCL CAD BI: CPT

## 2019-05-08 PROCEDURE — G0279: CPT

## 2019-05-08 PROCEDURE — 77066 DX MAMMO INCL CAD BI: CPT | Mod: 26

## 2019-06-03 ENCOUNTER — OUTPATIENT (OUTPATIENT)
Dept: OUTPATIENT SERVICES | Facility: HOSPITAL | Age: 69
LOS: 1 days | Discharge: HOME | End: 2019-06-03

## 2019-06-03 DIAGNOSIS — Z90.3 ACQUIRED ABSENCE OF STOMACH [PART OF]: Chronic | ICD-10-CM

## 2019-06-03 DIAGNOSIS — Z98.89 OTHER SPECIFIED POSTPROCEDURAL STATES: Chronic | ICD-10-CM

## 2019-06-03 DIAGNOSIS — Z98.890 OTHER SPECIFIED POSTPROCEDURAL STATES: Chronic | ICD-10-CM

## 2019-06-03 DIAGNOSIS — C50.919 MALIGNANT NEOPLASM OF UNSPECIFIED SITE OF UNSPECIFIED FEMALE BREAST: ICD-10-CM

## 2019-06-03 DIAGNOSIS — Z90.49 ACQUIRED ABSENCE OF OTHER SPECIFIED PARTS OF DIGESTIVE TRACT: Chronic | ICD-10-CM

## 2019-06-03 DIAGNOSIS — C50.919 MALIGNANT NEOPLASM OF UNSPECIFIED SITE OF UNSPECIFIED FEMALE BREAST: Chronic | ICD-10-CM

## 2019-06-04 ENCOUNTER — RX CHANGE (OUTPATIENT)
Age: 69
End: 2019-06-04

## 2019-06-07 ENCOUNTER — LABORATORY RESULT (OUTPATIENT)
Age: 69
End: 2019-06-07

## 2019-06-07 ENCOUNTER — APPOINTMENT (OUTPATIENT)
Dept: HEMATOLOGY ONCOLOGY | Facility: CLINIC | Age: 69
End: 2019-06-07
Payer: COMMERCIAL

## 2019-06-07 ENCOUNTER — RESULT REVIEW (OUTPATIENT)
Age: 69
End: 2019-06-07

## 2019-06-07 ENCOUNTER — APPOINTMENT (OUTPATIENT)
Dept: INFUSION THERAPY | Facility: HOSPITAL | Age: 69
End: 2019-06-07

## 2019-06-07 VITALS
SYSTOLIC BLOOD PRESSURE: 139 MMHG | HEART RATE: 65 BPM | RESPIRATION RATE: 16 BRPM | DIASTOLIC BLOOD PRESSURE: 81 MMHG | WEIGHT: 201 LBS | OXYGEN SATURATION: 97 % | BODY MASS INDEX: 36.76 KG/M2 | TEMPERATURE: 98.4 F

## 2019-06-07 LAB
BASOPHILS # BLD AUTO: 0 K/UL — SIGNIFICANT CHANGE UP (ref 0–0.2)
BASOPHILS NFR BLD AUTO: 0.6 % — SIGNIFICANT CHANGE UP (ref 0–2)
EOSINOPHIL # BLD AUTO: 0.2 K/UL — SIGNIFICANT CHANGE UP (ref 0–0.5)
EOSINOPHIL NFR BLD AUTO: 4.4 % — SIGNIFICANT CHANGE UP (ref 0–6)
HCT VFR BLD CALC: 35.1 % — SIGNIFICANT CHANGE UP (ref 34.5–45)
HGB BLD-MCNC: 11.9 G/DL — SIGNIFICANT CHANGE UP (ref 11.5–15.5)
LYMPHOCYTES # BLD AUTO: 0.8 K/UL — LOW (ref 1–3.3)
LYMPHOCYTES # BLD AUTO: 17.6 % — SIGNIFICANT CHANGE UP (ref 13–44)
MCHC RBC-ENTMCNC: 30.6 PG — SIGNIFICANT CHANGE UP (ref 27–34)
MCHC RBC-ENTMCNC: 33.9 G/DL — SIGNIFICANT CHANGE UP (ref 32–36)
MCV RBC AUTO: 90.3 FL — SIGNIFICANT CHANGE UP (ref 80–100)
MONOCYTES # BLD AUTO: 0.7 K/UL — SIGNIFICANT CHANGE UP (ref 0–0.9)
MONOCYTES NFR BLD AUTO: 13.9 % — SIGNIFICANT CHANGE UP (ref 2–14)
NEUTROPHILS # BLD AUTO: 3.1 K/UL — SIGNIFICANT CHANGE UP (ref 1.8–7.4)
NEUTROPHILS NFR BLD AUTO: 63.6 % — SIGNIFICANT CHANGE UP (ref 43–77)
PLATELET # BLD AUTO: 310 K/UL — SIGNIFICANT CHANGE UP (ref 150–400)
RBC # BLD: 3.88 M/UL — SIGNIFICANT CHANGE UP (ref 3.8–5.2)
RBC # FLD: 12.5 % — SIGNIFICANT CHANGE UP (ref 10.3–14.5)
WBC # BLD: 4.8 K/UL — SIGNIFICANT CHANGE UP (ref 3.8–10.5)
WBC # FLD AUTO: 4.8 K/UL — SIGNIFICANT CHANGE UP (ref 3.8–10.5)

## 2019-06-07 PROCEDURE — 99214 OFFICE O/P EST MOD 30 MIN: CPT

## 2019-06-07 RX ORDER — CHLORHEXIDINE GLUCONATE, 0.12% ORAL RINSE 1.2 MG/ML
0.12 SOLUTION DENTAL
Qty: 473 | Refills: 0 | Status: DISCONTINUED | COMMUNITY
Start: 2019-03-14

## 2019-06-07 RX ORDER — ANASTROZOLE TABLETS 1 MG/1
1 TABLET ORAL DAILY
Qty: 1 | Refills: 2 | Status: DISCONTINUED | COMMUNITY
Start: 2019-04-12 | End: 2019-06-07

## 2019-06-07 NOTE — HISTORY OF PRESENT ILLNESS
[Disease: _____________________] : Disease: [unfilled] [AJCC Stage: ____] : AJCC Stage: [unfilled] [de-identified] : Genetic testing: [facility]: no actionable mutations \par dose dense AC 7/12/18 to 8/23/18 \par dose dense paclitaxel: 9/6/18 -10/25\par 11/28/18- Dr. Rossi lumpectomy and sentinel lymph node biopsy \par RT Dr Patel: 2/2019 [de-identified] : Age 67: left breast cancer \par She had noticed left breast pain: burning sensation and felt lump. She had left breast 11:00 biopsy in 4/2018 which showed high grade malignant neoplasm that was JYOTSNA 3 positive, ER negative, CA 3%, and Ydc6jro negative Ki 67 of 70%. She had MRI of the breast that showed satellite lesion in the left upper outer quadrant which showed DCIS cribiform pattern with low grade nuclear atypia that was ER >98% and CA 80%. Since her initial mammogram and MRI, she feels the breast mass is growing larger and more tender. She saw Dr Rossi who has referred her for neoadjuvant chemotherapy prior to breast conservation surgery. She had [facility] genetic testing that did not show any actionable mutations. She consented to dose dense ACT neoadjuvant chemotherapy and started on 7/12/18. She tolerated AC well but developed presyncopal event and neutropenia Grade 3 after 1st cycle of paclitaxel. She completed neoadjuvant chemotherapy 10/25/18. On 11/28/18, she underwent left lumpectomy with sentinel lymph node biopsy with Dr Rossi. The pathology showed no residual invasive carcinoma, biopsy changes, and negative 7 lymph nodes.  [de-identified] : ER negative, VT 70%, Iyo7ync negative  [de-identified] : She had HA which is improved after being off anastrozole for 3 days. Still has chronic migraine headaches but better. She was surprised she had so many symptoms: fatigue/ weight gain/ joint pain with only taking MWF but now better. Joint pain resolved off AI. Still has fatigue and takes short nap in afternoon and getting her usual activities done. 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 She is also helping her friends who have been diagnosed with breast cancer. She occasionally feels low: today more blue. She sleeps at night and wakes up with energy but sometimes sad and doesn’t understand why. She is willing to join support group and s/w . Discomfort over the left breast but mild and does not need to take anything for it; comes and goes.

## 2019-06-07 NOTE — PHYSICAL EXAM
[Fully active, able to carry on all pre-disease performance without restriction] : Status 0 - Fully active, able to carry on all pre-disease performance without restriction [Normal] : affect appropriate [de-identified] : right cw mediport CDI   [de-identified] : lumpectomy and sentinel lymph node site; no palpable lymph nodes or new nodules

## 2019-06-07 NOTE — REVIEW OF SYSTEMS
[Skin Wound] : no skin wound [Skin Rash] : no skin rash [Suicidal] : not suicidal [Insomnia] : no insomnia [Anxiety] : no anxiety [Depression] : depression [Negative] : Allergic/Immunologic [de-identified] : left breast tenderness

## 2019-06-07 NOTE — ASSESSMENT
[FreeTextEntry1] : 69 y/o  BRCA negative F with left clinical Stage II to III breast cancer that is ER negative, TX 3%, and Uxx1wff negative. She completed AC x 4 Taxol X 4 10/25/18. She had pathologic CR with her triple negative breast cancer. She completed RT to the breast and did not tolerate trial of anastrozole for ER/ TX positive for DCIS. Due to SE and QOL, we will continue with surveillance alone. We reviewed depression that can occur in pts after chemotherapy and reviewed talk therapy/ support group. Will refer to Patience who she met last year. If depression is not improved, will consider course of Lexapro. Will continue with port flush: next follow up in 2 port flushes.

## 2019-06-07 NOTE — REASON FOR VISIT
[Follow-Up Visit] : a follow-up [FreeTextEntry2] : follow up for breast cancer off AI therapy with improved HA and joint symptoms

## 2019-07-16 ENCOUNTER — APPOINTMENT (OUTPATIENT)
Dept: PULMONOLOGY | Facility: CLINIC | Age: 69
End: 2019-07-16
Payer: COMMERCIAL

## 2019-07-16 ENCOUNTER — NON-APPOINTMENT (OUTPATIENT)
Age: 69
End: 2019-07-16

## 2019-07-16 VITALS
HEIGHT: 62 IN | HEART RATE: 66 BPM | OXYGEN SATURATION: 97 % | SYSTOLIC BLOOD PRESSURE: 110 MMHG | DIASTOLIC BLOOD PRESSURE: 80 MMHG | BODY MASS INDEX: 36.8 KG/M2 | RESPIRATION RATE: 17 BRPM | WEIGHT: 200 LBS

## 2019-07-16 PROCEDURE — 94010 BREATHING CAPACITY TEST: CPT

## 2019-07-16 PROCEDURE — 95012 NITRIC OXIDE EXP GAS DETER: CPT

## 2019-07-16 PROCEDURE — 99214 OFFICE O/P EST MOD 30 MIN: CPT | Mod: 25

## 2019-07-16 NOTE — PROCEDURE
[FreeTextEntry1] : PFT - spi reveals normal flows; FEV1 is 2.29 which is 106% of predicted, normal flow volume loop \par \par FENO was 16; a normal value being less than 25\par \par Fractional exhaled nitric oxide (FENO) is regarded as a simple, noninvasive method for assessing eosinophilic airway inflammation. Produced by a variety of cells within the lung, nitric oxide (NO) concentrations are generally low in healthy individuals. However, high concentrations of NO appear to be involved in nonspecific host defense mechanisms and chronic inflammatory diseases such as asthma. The American Thoracic Society (ATS) therefore has recommended using FENO to aid in the diagnosis and monitoring of eosinophilic airway inflammation and asthma, and for identifying steroid responsive individuals whose chronic respiratory symptoms may be caused by airway inflammation.

## 2019-07-16 NOTE — ASSESSMENT
[FreeTextEntry1] : Ms. Colunga is a 68 year old female with a history of asthma, allergy, GERD, awaiting RT. She doing well from a pulmonary perspective she is s/p chemotherapy/radiation therapy for breast cancer. \par \par Her shortness of breath is stable, though multifactorial due to:\par -mild asthma\par -poor breathing mechanics\par -obesity\par -underlying cardiac disease\par \par problem 1: mild asthma\par -continue to use ProAir PRN and before exercise\par -Asthma is believed to be caused by inherited (genetic) and environmental factor, but its exact cause is unknown. Asthma may be triggered by allergens, lung infections, or irritants in the air. Asthma triggers are different for each person \par -Inhaler technique reviewed as well as oral hygiene techniques reviewed with patient. Avoidance of cold air, extremes of temperature, rescue inhaler should be used before exercise. Order of medication reviewed with patient. Recommended use of a cool mist humidifier in the bedroom. \par \par problem 2: allergies and sinuses\par -continue to use Zaditor eye drops\par -continue to use OTC antihistamine PRN \par -Environmental measures for allergies were encouraged including mattress and pillow cover, air purifier, and environmental controls.\par \par problem 3: GERD\par -diet controlled, prior on Omeprazole and Zantac \par -Rule of 2s: avoid eating too much, eating too late, eating too spicy, eating two hours before bed\par -Things to avoid including overeating, spicy foods, tight clothing, eating within three hours of bed, this list is not all inclusive. \par -For treatment of reflux, possible options discussed including diet control, H2 blockers, PPIs, as well as coating motility agents discussed as treatment options. Timing of meals and proximity of last meal to sleep were discussed. If symptoms persist, a formal gastrointestinal evaluation is needed. \par \par problem 4: low vitamin D\par -continue to use vitamin D supplementation\par -Has been associated with asthma exacerbations and increased allergic symptoms. The goal based on recent information is maintaining levels between 50-70 and low normal is 30. Recommended 50,000 units every two weeks to once a month depending on the level. \par \par problem 5: breast cancer\par -s/p chemotherapy; s/p RT Rx\par \par problem 6: obesity - (improved)\par -s/p gastric sleeve surgery \par -improved \par -Weight loss, exercise, and diet control were discussed and are highly encouraged. Treatment options were given such as, aqua therapy, and contacting a nutritionist. Recommended to use the elliptical, stationary bike, less use of treadmill. Obesity is associated with worsening asthma, shortness of breath, and potential for cardiac disease, diabetes, and other underlying medical conditions. \par \par problem 7: nocturia\par -she is being recommended to avoid drinking water 3 hours before bed \par \par problem 8: snoring - (quiet)\par -resolved with weight loss \par Good sleep hygiene was encouraged including avoiding watching television an hour before bed, keeping caffeine at a low, avoiding reading, television, or anything, in bed, no drinking any liquids three hours before bedtime, and only getting into bed when tired and ready for sleep. \par \par problem 9: health maintenance \par -s/p influenza vaccine (01.08.2019)\par -recommended strep pneumonia vaccines: Prevnar-13 vaccine (refused in past), followed by Pneumo vaccine 23 one year following\par -recommended early intervention for URIs\par -recommended regular osteoporosis evaluations\par -recommended early dermatological evaluations\par -recommended after the age of 50 to the age of 70, colonoscopy every 5 years \par \par F/u in 6 months - SPI / DLCO\par She is encouraged to call with any changes, concerns, or questions.

## 2019-07-16 NOTE — HISTORY OF PRESENT ILLNESS
[FreeTextEntry1] : Ms. Colunga is a 68 year old female with a history of allergic rhinitis, asthma, chronic fatigue, GERD, obesity, and snoring presenting to the office today for a follow up visit. She has no complaints at this time. \par -she states that she has been recovering from chemotherapy treatment \par -she notes that she has been sleeping well, and getting enough sleep. she has not been snoring\par -she reports some diarrhea and constipation\par -she has been exercising regularly with her stationary bike, pool, and walking. she has no orthopedic limitations. she has begun doing yoga at the cancer center\par -she reports that her weight has been stable\par -her allergies have been controlled\par -she denies any headaches, nausea, vomiting, fever, chills, sweats, chest pain, chest pressure, dysphagia, dizziness, leg swelling, leg pain, itchy eyes, itchy ears, heartburn, reflux, or sour taste in the mouth, leg cramps, cough, wheeze, SOB.

## 2019-07-16 NOTE — REASON FOR VISIT
[Follow-Up] : a follow-up visit [FreeTextEntry1] : allergic rhinitis, asthma, chronic fatigue, GERD, obesity, and snoring

## 2019-07-16 NOTE — PHYSICAL EXAM

## 2019-07-16 NOTE — ADDENDUM
[FreeTextEntry1] : All medical record entries made by burke Polanco were at Dr. Андрей Rashid's, direction and personally dictated by me on 07/16/2019. I have reviewed the chart and agree that the record accurately reflects my personal performance of the history, physical exam, assessment and plan. I have also personally directed, reviewed, and agree with the discharge instructions.

## 2019-07-22 ENCOUNTER — OUTPATIENT (OUTPATIENT)
Dept: OUTPATIENT SERVICES | Facility: HOSPITAL | Age: 69
LOS: 1 days | Discharge: HOME | End: 2019-07-22

## 2019-07-22 DIAGNOSIS — Z98.890 OTHER SPECIFIED POSTPROCEDURAL STATES: Chronic | ICD-10-CM

## 2019-07-22 DIAGNOSIS — Z98.89 OTHER SPECIFIED POSTPROCEDURAL STATES: Chronic | ICD-10-CM

## 2019-07-22 DIAGNOSIS — Z90.3 ACQUIRED ABSENCE OF STOMACH [PART OF]: Chronic | ICD-10-CM

## 2019-07-22 DIAGNOSIS — C50.919 MALIGNANT NEOPLASM OF UNSPECIFIED SITE OF UNSPECIFIED FEMALE BREAST: Chronic | ICD-10-CM

## 2019-07-22 DIAGNOSIS — C50.919 MALIGNANT NEOPLASM OF UNSPECIFIED SITE OF UNSPECIFIED FEMALE BREAST: ICD-10-CM

## 2019-07-22 DIAGNOSIS — Z90.49 ACQUIRED ABSENCE OF OTHER SPECIFIED PARTS OF DIGESTIVE TRACT: Chronic | ICD-10-CM

## 2019-07-26 ENCOUNTER — APPOINTMENT (OUTPATIENT)
Dept: INFUSION THERAPY | Facility: HOSPITAL | Age: 69
End: 2019-07-26

## 2019-09-10 ENCOUNTER — OUTPATIENT (OUTPATIENT)
Dept: OUTPATIENT SERVICES | Facility: HOSPITAL | Age: 69
LOS: 1 days | Discharge: HOME | End: 2019-09-10

## 2019-09-10 DIAGNOSIS — Z98.890 OTHER SPECIFIED POSTPROCEDURAL STATES: Chronic | ICD-10-CM

## 2019-09-10 DIAGNOSIS — Z98.89 OTHER SPECIFIED POSTPROCEDURAL STATES: Chronic | ICD-10-CM

## 2019-09-10 DIAGNOSIS — C50.919 MALIGNANT NEOPLASM OF UNSPECIFIED SITE OF UNSPECIFIED FEMALE BREAST: ICD-10-CM

## 2019-09-10 DIAGNOSIS — C50.919 MALIGNANT NEOPLASM OF UNSPECIFIED SITE OF UNSPECIFIED FEMALE BREAST: Chronic | ICD-10-CM

## 2019-09-10 DIAGNOSIS — Z90.3 ACQUIRED ABSENCE OF STOMACH [PART OF]: Chronic | ICD-10-CM

## 2019-09-10 DIAGNOSIS — Z90.49 ACQUIRED ABSENCE OF OTHER SPECIFIED PARTS OF DIGESTIVE TRACT: Chronic | ICD-10-CM

## 2019-09-12 ENCOUNTER — OTHER (OUTPATIENT)
Age: 69
End: 2019-09-12

## 2019-09-13 ENCOUNTER — LABORATORY RESULT (OUTPATIENT)
Age: 69
End: 2019-09-13

## 2019-09-13 ENCOUNTER — APPOINTMENT (OUTPATIENT)
Dept: HEMATOLOGY ONCOLOGY | Facility: CLINIC | Age: 69
End: 2019-09-13
Payer: COMMERCIAL

## 2019-09-13 ENCOUNTER — RESULT REVIEW (OUTPATIENT)
Age: 69
End: 2019-09-13

## 2019-09-13 ENCOUNTER — APPOINTMENT (OUTPATIENT)
Dept: INFUSION THERAPY | Facility: HOSPITAL | Age: 69
End: 2019-09-13

## 2019-09-13 VITALS
RESPIRATION RATE: 16 BRPM | TEMPERATURE: 98.1 F | DIASTOLIC BLOOD PRESSURE: 83 MMHG | BODY MASS INDEX: 37.26 KG/M2 | WEIGHT: 203.69 LBS | OXYGEN SATURATION: 100 % | HEART RATE: 75 BPM | SYSTOLIC BLOOD PRESSURE: 118 MMHG

## 2019-09-13 DIAGNOSIS — Z00.00 ENCOUNTER FOR GENERAL ADULT MEDICAL EXAMINATION W/OUT ABNORMAL FINDINGS: ICD-10-CM

## 2019-09-13 DIAGNOSIS — F32.9 MAJOR DEPRESSIVE DISORDER, SINGLE EPISODE, UNSPECIFIED: ICD-10-CM

## 2019-09-13 LAB
APTT BLD: 31.8 SEC
BASOPHILS # BLD AUTO: 0.1 K/UL — SIGNIFICANT CHANGE UP (ref 0–0.2)
BASOPHILS NFR BLD AUTO: 1 % — SIGNIFICANT CHANGE UP (ref 0–2)
EOSINOPHIL # BLD AUTO: 0.2 K/UL — SIGNIFICANT CHANGE UP (ref 0–0.5)
EOSINOPHIL NFR BLD AUTO: 3.8 % — SIGNIFICANT CHANGE UP (ref 0–6)
HCT VFR BLD CALC: 37.8 % — SIGNIFICANT CHANGE UP (ref 34.5–45)
HGB BLD-MCNC: 13 G/DL — SIGNIFICANT CHANGE UP (ref 11.5–15.5)
INR PPP: 0.97 RATIO
LYMPHOCYTES # BLD AUTO: 0.6 K/UL — LOW (ref 1–3.3)
LYMPHOCYTES # BLD AUTO: 9.7 % — LOW (ref 13–44)
MCHC RBC-ENTMCNC: 32.2 PG — SIGNIFICANT CHANGE UP (ref 27–34)
MCHC RBC-ENTMCNC: 34.4 G/DL — SIGNIFICANT CHANGE UP (ref 32–36)
MCV RBC AUTO: 93.6 FL — SIGNIFICANT CHANGE UP (ref 80–100)
MONOCYTES # BLD AUTO: 0.7 K/UL — SIGNIFICANT CHANGE UP (ref 0–0.9)
MONOCYTES NFR BLD AUTO: 10.8 % — SIGNIFICANT CHANGE UP (ref 2–14)
NEUTROPHILS # BLD AUTO: 4.8 K/UL — SIGNIFICANT CHANGE UP (ref 1.8–7.4)
NEUTROPHILS NFR BLD AUTO: 74.7 % — SIGNIFICANT CHANGE UP (ref 43–77)
PLATELET # BLD AUTO: 267 K/UL — SIGNIFICANT CHANGE UP (ref 150–400)
PT BLD: 11.2 SEC
RBC # BLD: 4.04 M/UL — SIGNIFICANT CHANGE UP (ref 3.8–5.2)
RBC # FLD: 13.5 % — SIGNIFICANT CHANGE UP (ref 10.3–14.5)
WBC # BLD: 6.4 K/UL — SIGNIFICANT CHANGE UP (ref 3.8–10.5)
WBC # FLD AUTO: 6.4 K/UL — SIGNIFICANT CHANGE UP (ref 3.8–10.5)

## 2019-09-13 PROCEDURE — 99215 OFFICE O/P EST HI 40 MIN: CPT

## 2019-09-14 NOTE — ASSESSMENT
[FreeTextEntry1] : 67 y/o  BRCA negative F with left clinical Stage II to III breast cancer that is ER negative, NV 3%, and Eha0xjk negative. She completed AC x 4 Taxol X 4 10/25/18. She had pathologic CR with her triple negative breast cancer. She completed RT to the breast and did not tolerate trial of anastrozole for ER/ NV positive for DCIS. Due to SE and QOL, we will continue with surveillance alone. We ordered portal removal based on pt's request with BW today, reviewed life style modification ( exercise / diet ) to help weight loss and achieve normal BMI, encouraged pt to continue join social activities to maintain active and keep positive mood. Advised pt to contact us if any worsening symptoms or new symptoms occurs, and  next f/u will be 3 months after the port removal.

## 2019-09-14 NOTE — PHYSICAL EXAM
[Fully active, able to carry on all pre-disease performance without restriction] : Status 0 - Fully active, able to carry on all pre-disease performance without restriction [Normal] : affect appropriate [de-identified] : right cw mediport CDI   [de-identified] : lumpectomy and sentinel lymph node site; no palpable lymph nodes or new nodules

## 2019-09-14 NOTE — REVIEW OF SYSTEMS
[Depression] : depression [Negative] : Allergic/Immunologic [Skin Wound] : no skin wound [Skin Rash] : no skin rash [Suicidal] : not suicidal [Insomnia] : no insomnia [Anxiety] : no anxiety [de-identified] : left breast scars from lumpectomy surgery, no tenderness, no skin color change, dry and clean

## 2019-09-14 NOTE — HISTORY OF PRESENT ILLNESS
[AJCC Stage: ____] : AJCC Stage: [unfilled] [Disease: _____________________] : Disease: [unfilled] [de-identified] : Age 67: left breast cancer \par She had noticed left breast pain: burning sensation and felt lump. She had left breast 11:00 biopsy in 4/2018 which showed high grade malignant neoplasm that was JYOTSNA 3 positive, ER negative, PA 3%, and Tuz8ipu negative Ki 67 of 70%. She had MRI of the breast that showed satellite lesion in the left upper outer quadrant which showed DCIS cribiform pattern with low grade nuclear atypia that was ER >98% and PA 80%. Since her initial mammogram and MRI, she feels the breast mass is growing larger and more tender. She saw Dr Rossi who has referred her for neoadjuvant chemotherapy prior to breast conservation surgery. She had [facility] genetic testing that did not show any actionable mutations. She consented to dose dense ACT neoadjuvant chemotherapy and started on 7/12/18. She tolerated AC well but developed presyncopal event and neutropenia Grade 3 after 1st cycle of paclitaxel. She completed neoadjuvant chemotherapy 10/25/18. On 11/28/18, she underwent left lumpectomy with sentinel lymph node biopsy with Dr Rossi. The pathology showed no residual invasive carcinoma, biopsy changes, and negative 7 lymph nodes.  [de-identified] : ER negative, CA 70%, Ojj3ynz negative  [de-identified] : Genetic testing: [facility]: no actionable mutations \par dose dense AC 7/12/18 to 8/23/18 \par dose dense paclitaxel: 9/6/18 -10/25\par 11/28/18- Dr. Rossi lumpectomy and sentinel lymph node biopsy \par RT Dr Patel: 2/2019 [de-identified] : Pt reports feeling well since she was off anastrozole for past 3 months, no c/o at all, all the previous symptoms ( fatigue / joint pain/ depression ) are no longer bothers her any more. She is able to eat /drink normally, BM daily, denies SOB/ Chest pain/ Palpitation/ fever/ chills/nausea / vomiting/ diarrhea / constipation/ pain or any new symptoms since last visit. Asked about port removal since last chemo was 10/25/18 and last Mammo on 5/8/19 had benign findings. \par

## 2019-09-18 ENCOUNTER — APPOINTMENT (OUTPATIENT)
Dept: RADIATION ONCOLOGY | Facility: CLINIC | Age: 69
End: 2019-09-18
Payer: COMMERCIAL

## 2019-09-18 VITALS
BODY MASS INDEX: 38.57 KG/M2 | RESPIRATION RATE: 16 BRPM | OXYGEN SATURATION: 97 % | HEART RATE: 66 BPM | WEIGHT: 210.87 LBS | DIASTOLIC BLOOD PRESSURE: 78 MMHG | SYSTOLIC BLOOD PRESSURE: 151 MMHG

## 2019-09-18 PROCEDURE — 99213 OFFICE O/P EST LOW 20 MIN: CPT

## 2019-09-18 RX ORDER — LORAZEPAM 0.5 MG/1
0.5 TABLET ORAL
Qty: 20 | Refills: 0 | Status: DISCONTINUED | COMMUNITY
Start: 2018-10-11 | End: 2019-09-18

## 2019-09-18 RX ORDER — CHROMIUM 200 MCG
1000 TABLET ORAL
Refills: 0 | Status: DISCONTINUED | COMMUNITY
Start: 2019-06-07 | End: 2019-09-18

## 2019-09-26 ENCOUNTER — FORM ENCOUNTER (OUTPATIENT)
Age: 69
End: 2019-09-26

## 2019-09-27 ENCOUNTER — OUTPATIENT (OUTPATIENT)
Dept: OUTPATIENT SERVICES | Facility: HOSPITAL | Age: 69
LOS: 1 days | End: 2019-09-27
Payer: COMMERCIAL

## 2019-09-27 DIAGNOSIS — C50.912 MALIGNANT NEOPLASM OF UNSPECIFIED SITE OF LEFT FEMALE BREAST: ICD-10-CM

## 2019-09-27 DIAGNOSIS — C50.919 MALIGNANT NEOPLASM OF UNSPECIFIED SITE OF UNSPECIFIED FEMALE BREAST: Chronic | ICD-10-CM

## 2019-09-27 DIAGNOSIS — Z90.3 ACQUIRED ABSENCE OF STOMACH [PART OF]: Chronic | ICD-10-CM

## 2019-09-27 DIAGNOSIS — Z98.890 OTHER SPECIFIED POSTPROCEDURAL STATES: Chronic | ICD-10-CM

## 2019-09-27 DIAGNOSIS — Z90.49 ACQUIRED ABSENCE OF OTHER SPECIFIED PARTS OF DIGESTIVE TRACT: Chronic | ICD-10-CM

## 2019-09-27 DIAGNOSIS — Z98.89 OTHER SPECIFIED POSTPROCEDURAL STATES: Chronic | ICD-10-CM

## 2019-09-27 PROCEDURE — 77001 FLUOROGUIDE FOR VEIN DEVICE: CPT

## 2019-09-27 PROCEDURE — 36590 REMOVAL TUNNELED CV CATH: CPT

## 2019-09-27 PROCEDURE — 77001 FLUOROGUIDE FOR VEIN DEVICE: CPT | Mod: 26

## 2019-09-30 NOTE — PHYSICAL EXAM
[General Appearance - In No Acute Distress] : in no acute distress [General Appearance - Alert] : alert [Normal] : no respiratory distress, lungs were clear to auscultation bilaterally [Heart Rate And Rhythm] : heart rate and rhythm were normal [Heart Sounds] : normal S1 and S2 [Cervical Lymph Nodes Enlarged Posterior Bilaterally] : posterior cervical [Cervical Lymph Nodes Enlarged Anterior Bilaterally] : anterior cervical [Supraclavicular Lymph Nodes Enlarged Bilaterally] : supraclavicular [Motor Tone] : muscle strength and tone were normal [Axillary Lymph Nodes Enlarged Bilaterally] : axillary [Oriented To Time, Place, And Person] : oriented to person, place, and time [Skin Color & Pigmentation] : normal skin color and pigmentation [Breast Palpation Mass] : no palpable masses [Breast Abnormal Lactation (Galactorrhea)] : no nipple discharge [No UE Edema] : there is no upper extremity edema [de-identified] : slight left upper outer breast tenderness to palpation

## 2019-09-30 NOTE — REVIEW OF SYSTEMS
[Fatigue] : fatigue [Fatigue: Grade 1 - Fatigue relieved by rest] : Fatigue: Grade 1 - Fatigue relieved by rest [Breast Pain: Grade 0] : Breast Pain: Grade 0 [Dyspnea: Grade 0] : Dyspnea: Grade 0 [Cough: Grade 0] : Cough: Grade 0 [Dermatitis Radiation: Grade 0] : Dermatitis Radiation: Grade 0 [Skin Hyperpigmentation: Grade 0] : Skin Hyperpigmentation: Grade 0 [Skin Induration: Grade 0] : Skin Induration: Grade 0 [Fever] : no fever [Night Sweats] : no night sweats

## 2019-09-30 NOTE — HISTORY OF PRESENT ILLNESS
[FreeTextEntry1] : Ms. 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 s/p left breast lumpectomy with SLN biopsy.\par She received Radiation Therapy to a total of 5240 cGy from 1/18/19 -2/19/19, did not develop any grade 3 or higher RT toxicity.\par \par She presents for a follow up. Today she notes fatigue with overexertion and slight left upper outer breast tenderness to palpation. She saw Dr. Hussein in September and has been off  AI due to worsening migraines. Mammo  5/8/19 showed no mammographic evidence of malignancy in either breast. She followed up with Dr. Rossi in May and will see her annually.\par \par

## 2019-10-03 DIAGNOSIS — Z45.2 ENCOUNTER FOR ADJUSTMENT AND MANAGEMENT OF VASCULAR ACCESS DEVICE: ICD-10-CM

## 2019-10-03 DIAGNOSIS — C50.912 MALIGNANT NEOPLASM OF UNSPECIFIED SITE OF LEFT FEMALE BREAST: ICD-10-CM

## 2020-03-18 ENCOUNTER — APPOINTMENT (OUTPATIENT)
Dept: RADIATION ONCOLOGY | Facility: CLINIC | Age: 70
End: 2020-03-18

## 2020-03-27 ENCOUNTER — APPOINTMENT (OUTPATIENT)
Dept: PULMONOLOGY | Facility: CLINIC | Age: 70
End: 2020-03-27
Payer: COMMERCIAL

## 2020-03-27 VITALS
HEART RATE: 89 BPM | OXYGEN SATURATION: 95 % | DIASTOLIC BLOOD PRESSURE: 80 MMHG | RESPIRATION RATE: 16 BRPM | BODY MASS INDEX: 35.33 KG/M2 | HEIGHT: 62 IN | SYSTOLIC BLOOD PRESSURE: 140 MMHG | WEIGHT: 192 LBS

## 2020-03-27 PROCEDURE — 71046 X-RAY EXAM CHEST 2 VIEWS: CPT

## 2020-03-27 PROCEDURE — 99214 OFFICE O/P EST MOD 30 MIN: CPT | Mod: 25

## 2020-03-27 NOTE — PROCEDURE
[FreeTextEntry1] : X-ray reveals (Mar.25.2020): Right lower lobe infiltrate compatible with pneumonia. \par There is no uncoiling of the aorta indicative of HTN. Retrocardiac hiatus hernia. \par \par CXR reveals a normal sized heart; evidence of vague remnant of right lower lobe infiltrate, improved from prior xray.\par \par Abd U/S (3.5.2020) impression: Kidney and bladder unremarkable s/p cholecystectomy had heterogenous appearing liver c/w with fatty liver

## 2020-03-27 NOTE — ASSESSMENT
[FreeTextEntry1] : Ms. Colunga is a 69 year old female with a history of asthma, allergy, GERD, s/p chemo RT for breast CA.  She is s/p chemotherapy/radiation therapy for breast cancer. She is now in midst of right lower lobe pneumonia.\par \par Her shortness of breath is stable, though multifactorial due to:\par -mild asthma\par -poor breathing mechanics\par -obesity\par -underlying cardiac disease\par \par  Problem 1A: Pneumonia\par - on Ceftin 500 mg BID 10 days\par -  on Doxycycline 100 mg BID 10 days \par - Follow up Xray in 3 weeks \par Your chest xray/CT reveals an infiltrate c/w pneumonia; this based on your clinical scenario required additional therapy. Any change in your status will require hospitalization at the nearest hospital and al local ambulance will need to be called. Our group is on staff at [facility] and [facility] as- consultants. The patient/family is instructed to notify our office with any change in condition. \par \par \par problem 1: mild asthma (active)\par - Add Symbicort (160) 2 inhalations BID\par - Hold off on oral steroids\par -continue to use ProAir PRN and before exercise\par -Asthma is believed to be caused by inherited (genetic) and environmental factor, but its exact cause is unknown. Asthma may be triggered by allergens, lung infections, or irritants in the air. Asthma triggers are different for each person \par -Inhaler technique reviewed as well as oral hygiene techniques reviewed with patient. Avoidance of cold air, extremes of temperature, rescue inhaler should be used before exercise. Order of medication reviewed with patient. Recommended use of a cool mist humidifier in the bedroom. \par \par problem 2: allergies and sinuses\par -continue to use Zaditor eye drops\par -continue to use OTC antihistamine PRN \par -Environmental measures for allergies were encouraged including mattress and pillow cover, air purifier, and environmental controls.\par \par problem 3: GERD\par -diet controlled, prior on Omeprazole and Zantac \par -Rule of 2s: avoid eating too much, eating too late, eating too spicy, eating two hours before bed\par -Things to avoid including overeating, spicy foods, tight clothing, eating within three hours of bed, this list is not all inclusive. \par -For treatment of reflux, possible options discussed including diet control, H2 blockers, PPIs, as well as coating motility agents discussed as treatment options. Timing of meals and proximity of last meal to sleep were discussed. If symptoms persist, a formal gastrointestinal evaluation is needed. \par \par problem 4: low vitamin D\par -continue to use vitamin D supplementation\par -Has been associated with asthma exacerbations and increased allergic symptoms. The goal based on recent information is maintaining levels between 50-70 and low normal is 30. Recommended 50,000 units every two weeks to once a month depending on the level. \par \par problem 5: breast cancer\par -s/p chemotherapy; s/p RT Rx\par \par problem 6: obesity - (improved)\par -s/p gastric sleeve surgery \par -improved \par -Weight loss, exercise, and diet control were discussed and are highly encouraged. Treatment options were given such as, aqua therapy, and contacting a nutritionist. Recommended to use the elliptical, stationary bike, less use of treadmill. Obesity is associated with worsening asthma, shortness of breath, and potential for cardiac disease, diabetes, and other underlying medical conditions. \par \par problem 7: nocturia\par -she is being recommended to avoid drinking water 3 hours before bed \par \par problem 8: snoring - (quiet)\par -resolved with weight loss \par Good sleep hygiene was encouraged including avoiding watching television an hour before bed, keeping caffeine at a low, avoiding reading, television, or anything, in bed, no drinking any liquids three hours before bedtime, and only getting into bed when tired and ready for sleep. \par \par problem 9: health maintenance \par -s/p influenza vaccine (01.08.2019)\par -recommended strep pneumonia vaccines: Prevnar-13 vaccine (refused in past), followed by Pneumo vaccine 23 one year following\par -recommended early intervention for URIs\par -recommended regular osteoporosis evaluations\par -recommended early dermatological evaluations\par -recommended after the age of 50 to the age of 70, colonoscopy every 5 years \par \par F/u in 6 months - SPI / DLCO\par She is encouraged to call with any changes, concerns, or questions.

## 2020-03-27 NOTE — PHYSICAL EXAM
[No Acute Distress] : no acute distress [No Neck Mass] : no neck mass [Normal Rate/Rhythm] : normal rate/rhythm [Normal S1, S2] : normal s1, s2 [No Murmurs] : no murmurs [No Resp Distress] : no resp distress [Clear to Auscultation Bilaterally] : clear to auscultation bilaterally [No Abnormalities] : no abnormalities [Benign] : benign [Normal Gait] : normal gait [No Clubbing] : no clubbing [No Cyanosis] : no cyanosis [No Edema] : no edema [FROM] : FROM [Normal Color/ Pigmentation] : normal color/ pigmentation [Oriented x3] : oriented x3 [Normal Affect] : normal affect [General Appearance - Well Developed] : well developed [Normal Appearance] : normal appearance [Well Groomed] : well groomed [General Appearance - Well Nourished] : well nourished [No Deformities] : no deformities [General Appearance - In No Acute Distress] : no acute distress [Normal Conjunctiva] : the conjunctiva exhibited no abnormalities [Eyelids - No Xanthelasma] : the eyelids demonstrated no xanthelasmas [Normal Oropharynx] : normal oropharynx [Neck Appearance] : the appearance of the neck was normal [Neck Cervical Mass (___cm)] : no neck mass was observed [Jugular Venous Distention Increased] : there was no jugular-venous distention [Thyroid Diffuse Enlargement] : the thyroid was not enlarged [Thyroid Nodule] : there were no palpable thyroid nodules [Heart Rate And Rhythm] : heart rate and rhythm were normal [Heart Sounds] : normal S1 and S2 [Murmurs] : no murmurs present [Respiration, Rhythm And Depth] : normal respiratory rhythm and effort [Exaggerated Use Of Accessory Muscles For Inspiration] : no accessory muscle use [Auscultation Breath Sounds / Voice Sounds] : lungs were clear to auscultation bilaterally [Abdomen Soft] : soft [Abdomen Tenderness] : non-tender [Abdomen Mass (___ Cm)] : no abdominal mass palpated [Abnormal Walk] : normal gait [Gait - Sufficient For Exercise Testing] : the gait was sufficient for exercise testing [Nail Clubbing] : no clubbing of the fingernails [Cyanosis, Localized] : no localized cyanosis [Petechial Hemorrhages (___cm)] : no petechial hemorrhages [Skin Color & Pigmentation] : normal skin color and pigmentation [] : no rash [No Venous Stasis] : no venous stasis [Skin Lesions] : no skin lesions [No Skin Ulcers] : no skin ulcer [No Xanthoma] : no  xanthoma was observed [Deep Tendon Reflexes (DTR)] : deep tendon reflexes were 2+ and symmetric [Sensation] : the sensory exam was normal to light touch and pinprick [No Focal Deficits] : no focal deficits [Oriented To Time, Place, And Person] : oriented to person, place, and time [Impaired Insight] : insight and judgment were intact [Affect] : the affect was normal [III] : III [FreeTextEntry1] : I:E ratio 1:3; expiratory wheezes

## 2020-03-27 NOTE — ADDENDUM
[FreeTextEntry1] : Documented by Brady Cornelius acting as a scribe for Dr. Андрей Rashid on 03/27/2020.\par \par All medical record entries made by the Scribe were at my, Dr. Андрей Rashid's, direction and personally dictated by me on 03/27/2020. I have reviewed the chart and agree that the record accurately reflects my personal performance of the history, physical exam, assessment and plan. I have also personally directed, reviewed, and agree with the discharge instructions.

## 2020-03-27 NOTE — HISTORY OF PRESENT ILLNESS
[FreeTextEntry1] : Ms. Colunga is a 69 year old female with a history of allergic rhinitis, asthma, chronic fatigue, GERD, obesity, and snoring presenting to the office today for a follow up visit. Today her chief complain is:\par -  a week ago Monday she had fever, chills, then a cough developed.\par - She notes she got tested for COVID in [place] and was corona negative. \par - She then had a heard time breathing and her fever was not going down. \par - She had called her primary and was sent for an xray. \par - Spoke with Luiclle and was given two Abx \par - she notes shes bringing up some sputum\par - as of this morning her fever has been down. \par \par -denies any headaches, nausea, vomiting, fever, chills, sweats, chest pain, chest pressure, diarrhea, constipation, dysphagia, dizziness, leg swelling, leg pain, itchy eyes, itchy ears, heartburn, reflux, or sour taste in the mouth.

## 2020-04-17 ENCOUNTER — APPOINTMENT (OUTPATIENT)
Dept: PULMONOLOGY | Facility: CLINIC | Age: 70
End: 2020-04-17
Payer: COMMERCIAL

## 2020-04-17 VITALS
SYSTOLIC BLOOD PRESSURE: 125 MMHG | DIASTOLIC BLOOD PRESSURE: 70 MMHG | OXYGEN SATURATION: 96 % | HEIGHT: 61 IN | WEIGHT: 193 LBS | RESPIRATION RATE: 16 BRPM | TEMPERATURE: 98 F | BODY MASS INDEX: 36.44 KG/M2 | HEART RATE: 80 BPM

## 2020-04-17 DIAGNOSIS — J18.9 PNEUMONIA, UNSPECIFIED ORGANISM: ICD-10-CM

## 2020-04-17 PROCEDURE — 99214 OFFICE O/P EST MOD 30 MIN: CPT | Mod: 25

## 2020-04-17 PROCEDURE — 71046 X-RAY EXAM CHEST 2 VIEWS: CPT

## 2020-04-17 NOTE — ASSESSMENT
[FreeTextEntry1] : Ms. Colunga is a 69 year old female with a history of asthma, allergy, GERD, s/p chemo RT for breast CA.  She is s/p chemotherapy/radiation therapy for breast cancer. She is now s/p of right lower lobe pneumonia. - Resolve SOB\par \par Her shortness of breath is stable, though multifactorial due to:\par -mild asthma\par -poor breathing mechanics\par -obesity\par -underlying cardiac disease\par \par  Problem 1A: Pneumonia (resolved)\par -s/p Ceftin 500 mg BID 10 days\par -s/p Doxycycline 100 mg BID 10 days \par - Follow up Xray (BAUDILIO)\par Your chest xray/CT reveals an infiltrate c/w pneumonia; this based on your clinical scenario required additional therapy. Any change in your status will require hospitalization at the nearest hospital and al local ambulance will need to be called. Our group is on staff at [facility] and [facility] as- consultants. The patient/family is instructed to notify our office with any change in condition. \par \par \par problem 1: mild asthma (controlled)\par -add Spiriva at 2 inhalations QAM \par - continue Symbicort (160) 2 inhalations BID\par - Hold off on oral steroids\par -continue to use ProAir PRN and before exercise\par -Asthma is believed to be caused by inherited (genetic) and environmental factor, but its exact cause is unknown. Asthma may be triggered by allergens, lung infections, or irritants in the air. Asthma triggers are different for each person \par -Inhaler technique reviewed as well as oral hygiene techniques reviewed with patient. Avoidance of cold air, extremes of temperature, rescue inhaler should be used before exercise. Order of medication reviewed with patient. Recommended use of a cool mist humidifier in the bedroom. \par \par problem 2: allergies and sinuses\par -continue to use Zaditor eye drops\par -continue to use OTC antihistamine PRN \par -Environmental measures for allergies were encouraged including mattress and pillow cover, air purifier, and environmental controls.\par \par problem 3: GERD\par -diet controlled, prior on Omeprazole and Zantac \par -Rule of 2s: avoid eating too much, eating too late, eating too spicy, eating two hours before bed\par -Things to avoid including overeating, spicy foods, tight clothing, eating within three hours of bed, this list is not all inclusive. \par -For treatment of reflux, possible options discussed including diet control, H2 blockers, PPIs, as well as coating motility agents discussed as treatment options. Timing of meals and proximity of last meal to sleep were discussed. If symptoms persist, a formal gastrointestinal evaluation is needed. \par \par problem 4: low vitamin D\par -continue to use vitamin D supplementation\par -Has been associated with asthma exacerbations and increased allergic symptoms. The goal based on recent information is maintaining levels between 50-70 and low normal is 30. Recommended 50,000 units every two weeks to once a month depending on the level. \par \par problem 5: breast cancer\par -s/p chemotherapy; s/p RT Rx\par \par problem 6: obesity - (improved)\par -s/p gastric sleeve surgery \par -improved \par -Weight loss, exercise, and diet control were discussed and are highly encouraged. Treatment options were given such as, aqua therapy, and contacting a nutritionist. Recommended to use the elliptical, stationary bike, less use of treadmill. Obesity is associated with worsening asthma, shortness of breath, and potential for cardiac disease, diabetes, and other underlying medical conditions. \par \par problem 7: nocturia\par -she is being recommended to avoid drinking water 3 hours before bed \par \par problem 8: snoring - (quiet)\par -resolved with weight loss \par Good sleep hygiene was encouraged including avoiding watching television an hour before bed, keeping caffeine at a low, avoiding reading, television, or anything, in bed, no drinking any liquids three hours before bedtime, and only getting into bed when tired and ready for sleep. \par \par problem 9: health maintenance \par -s/p influenza vaccine (01.08.2019)\par -recommended strep pneumonia vaccines: Prevnar-13 vaccine (refused in past), followed by Pneumo vaccine 23 one year following\par -recommended early intervention for URIs\par -recommended regular osteoporosis evaluations\par -recommended early dermatological evaluations\par -recommended after the age of 50 to the age of 70, colonoscopy every 5 years \par \par F/u in 3 months - SPI / DLCO\par She is encouraged to call with any changes, concerns, or questions.

## 2020-04-17 NOTE — HISTORY OF PRESENT ILLNESS
[FreeTextEntry1] : Ms. Colunga is a 69 year old female with a history of allergic rhinitis, asthma, chronic fatigue, GERD, obesity, and snoring presenting to the office today for a follow up visit. Today her chief complain is:\par -today she feels much better\par -she has dizziness \par -always has a little heartburn / reflux \par -low appetite\par -coughing a little bit\par -slight SOB\par -has been exercising very little using treadmill and needs to get her indoor bike fixed \par -today she denies any headaches, nausea, vomiting, fever, chills, sweats, chest pain, chest pressure, diarrhea, constipation, dysphagia, dizziness, leg swelling, leg pain, itchy eyes, itchy ears, heartburn, reflux, or sour taste in the mouth.

## 2020-04-17 NOTE — PHYSICAL EXAM
[No Acute Distress] : no acute distress [No Neck Mass] : no neck mass [Normal Rate/Rhythm] : normal rate/rhythm [Normal S1, S2] : normal s1, s2 [No Murmurs] : no murmurs [No Resp Distress] : no resp distress [Clear to Auscultation Bilaterally] : clear to auscultation bilaterally [No Abnormalities] : no abnormalities [Benign] : benign [Normal Gait] : normal gait [No Clubbing] : no clubbing [No Cyanosis] : no cyanosis [No Edema] : no edema [FROM] : FROM [Normal Color/ Pigmentation] : normal color/ pigmentation [Oriented x3] : oriented x3 [Normal Affect] : normal affect [General Appearance - Well Developed] : well developed [Normal Appearance] : normal appearance [Well Groomed] : well groomed [General Appearance - Well Nourished] : well nourished [No Deformities] : no deformities [General Appearance - In No Acute Distress] : no acute distress [Normal Conjunctiva] : the conjunctiva exhibited no abnormalities [Eyelids - No Xanthelasma] : the eyelids demonstrated no xanthelasmas [Normal Oropharynx] : normal oropharynx [III] : III [Neck Appearance] : the appearance of the neck was normal [Neck Cervical Mass (___cm)] : no neck mass was observed [Jugular Venous Distention Increased] : there was no jugular-venous distention [Thyroid Diffuse Enlargement] : the thyroid was not enlarged [Thyroid Nodule] : there were no palpable thyroid nodules [Heart Rate And Rhythm] : heart rate and rhythm were normal [Heart Sounds] : normal S1 and S2 [Murmurs] : no murmurs present [Respiration, Rhythm And Depth] : normal respiratory rhythm and effort [Exaggerated Use Of Accessory Muscles For Inspiration] : no accessory muscle use [Auscultation Breath Sounds / Voice Sounds] : lungs were clear to auscultation bilaterally [Abdomen Soft] : soft [Abdomen Tenderness] : non-tender [Abdomen Mass (___ Cm)] : no abdominal mass palpated [Abnormal Walk] : normal gait [Gait - Sufficient For Exercise Testing] : the gait was sufficient for exercise testing [Nail Clubbing] : no clubbing of the fingernails [Cyanosis, Localized] : no localized cyanosis [Petechial Hemorrhages (___cm)] : no petechial hemorrhages [Skin Color & Pigmentation] : normal skin color and pigmentation [] : no rash [No Venous Stasis] : no venous stasis [Skin Lesions] : no skin lesions [No Skin Ulcers] : no skin ulcer [No Xanthoma] : no  xanthoma was observed [Deep Tendon Reflexes (DTR)] : deep tendon reflexes were 2+ and symmetric [Sensation] : the sensory exam was normal to light touch and pinprick [No Focal Deficits] : no focal deficits [Oriented To Time, Place, And Person] : oriented to person, place, and time [Impaired Insight] : insight and judgment were intact [Affect] : the affect was normal [FreeTextEntry1] : I:E ratio 1:3; clear

## 2020-04-17 NOTE — ADDENDUM
[FreeTextEntry1] : Documented by Brady Cornelius acting as a scribe for Dr. Андрей Rashid on 04/17/2020.\par \par All medical record entries made by the Scribe were at my, Dr. Андрей Rashid's, direction and personally dictated by me on 04/17/2020. I have reviewed the chart and agree that the record accurately reflects my personal performance of the history, physical exam, assessment and plan. I have also personally directed, reviewed, and agree with the discharge instructions.

## 2020-06-01 ENCOUNTER — OUTPATIENT (OUTPATIENT)
Dept: OUTPATIENT SERVICES | Facility: HOSPITAL | Age: 70
LOS: 1 days | Discharge: HOME | End: 2020-06-01

## 2020-06-01 DIAGNOSIS — Z98.890 OTHER SPECIFIED POSTPROCEDURAL STATES: Chronic | ICD-10-CM

## 2020-06-01 DIAGNOSIS — Z90.49 ACQUIRED ABSENCE OF OTHER SPECIFIED PARTS OF DIGESTIVE TRACT: Chronic | ICD-10-CM

## 2020-06-01 DIAGNOSIS — Z98.89 OTHER SPECIFIED POSTPROCEDURAL STATES: Chronic | ICD-10-CM

## 2020-06-01 DIAGNOSIS — C50.919 MALIGNANT NEOPLASM OF UNSPECIFIED SITE OF UNSPECIFIED FEMALE BREAST: Chronic | ICD-10-CM

## 2020-06-01 DIAGNOSIS — C50.919 MALIGNANT NEOPLASM OF UNSPECIFIED SITE OF UNSPECIFIED FEMALE BREAST: ICD-10-CM

## 2020-06-01 DIAGNOSIS — Z90.3 ACQUIRED ABSENCE OF STOMACH [PART OF]: Chronic | ICD-10-CM

## 2020-06-03 ENCOUNTER — RESULT REVIEW (OUTPATIENT)
Age: 70
End: 2020-06-03

## 2020-06-03 ENCOUNTER — APPOINTMENT (OUTPATIENT)
Dept: HEMATOLOGY ONCOLOGY | Facility: CLINIC | Age: 70
End: 2020-06-03
Payer: COMMERCIAL

## 2020-06-03 VITALS
RESPIRATION RATE: 16 BRPM | HEART RATE: 80 BPM | BODY MASS INDEX: 36.49 KG/M2 | OXYGEN SATURATION: 96 % | SYSTOLIC BLOOD PRESSURE: 126 MMHG | TEMPERATURE: 98.2 F | DIASTOLIC BLOOD PRESSURE: 78 MMHG | WEIGHT: 193.12 LBS

## 2020-06-03 DIAGNOSIS — M54.9 DORSALGIA, UNSPECIFIED: ICD-10-CM

## 2020-06-03 LAB
BASOPHILS # BLD AUTO: 0.04 K/UL — SIGNIFICANT CHANGE UP (ref 0–0.2)
BASOPHILS NFR BLD AUTO: 0.6 % — SIGNIFICANT CHANGE UP (ref 0–2)
EOSINOPHIL # BLD AUTO: 0.21 K/UL — SIGNIFICANT CHANGE UP (ref 0–0.5)
EOSINOPHIL NFR BLD AUTO: 3.4 % — SIGNIFICANT CHANGE UP (ref 0–6)
HCT VFR BLD CALC: 36.3 % — SIGNIFICANT CHANGE UP (ref 34.5–45)
HGB BLD-MCNC: 11.8 G/DL — SIGNIFICANT CHANGE UP (ref 11.5–15.5)
IMM GRANULOCYTES NFR BLD AUTO: 0.2 % — SIGNIFICANT CHANGE UP (ref 0–1.5)
LYMPHOCYTES # BLD AUTO: 0.89 K/UL — LOW (ref 1–3.3)
LYMPHOCYTES # BLD AUTO: 14.4 % — SIGNIFICANT CHANGE UP (ref 13–44)
MCHC RBC-ENTMCNC: 28.4 PG — SIGNIFICANT CHANGE UP (ref 27–34)
MCHC RBC-ENTMCNC: 32.5 GM/DL — SIGNIFICANT CHANGE UP (ref 32–36)
MCV RBC AUTO: 87.5 FL — SIGNIFICANT CHANGE UP (ref 80–100)
MONOCYTES # BLD AUTO: 0.62 K/UL — SIGNIFICANT CHANGE UP (ref 0–0.9)
MONOCYTES NFR BLD AUTO: 10 % — SIGNIFICANT CHANGE UP (ref 2–14)
NEUTROPHILS # BLD AUTO: 4.41 K/UL — SIGNIFICANT CHANGE UP (ref 1.8–7.4)
NEUTROPHILS NFR BLD AUTO: 71.4 % — SIGNIFICANT CHANGE UP (ref 43–77)
NRBC # BLD: 0 /100 WBCS — SIGNIFICANT CHANGE UP (ref 0–0)
PLATELET # BLD AUTO: 280 K/UL — SIGNIFICANT CHANGE UP (ref 150–400)
RBC # BLD: 4.15 M/UL — SIGNIFICANT CHANGE UP (ref 3.8–5.2)
RBC # FLD: 13.8 % — SIGNIFICANT CHANGE UP (ref 10.3–14.5)
WBC # BLD: 6.18 K/UL — SIGNIFICANT CHANGE UP (ref 3.8–10.5)
WBC # FLD AUTO: 6.18 K/UL — SIGNIFICANT CHANGE UP (ref 3.8–10.5)

## 2020-06-03 PROCEDURE — 99214 OFFICE O/P EST MOD 30 MIN: CPT

## 2020-06-03 RX ORDER — CEFUROXIME AXETIL 500 MG/1
500 TABLET ORAL
Qty: 20 | Refills: 0 | Status: DISCONTINUED | COMMUNITY
Start: 2020-03-25 | End: 2020-06-03

## 2020-06-03 RX ORDER — DOXYCYCLINE 100 MG/1
100 CAPSULE ORAL TWICE DAILY
Qty: 20 | Refills: 0 | Status: DISCONTINUED | COMMUNITY
Start: 2020-03-25 | End: 2020-06-03

## 2020-06-03 RX ORDER — BENZONATATE 200 MG/1
200 CAPSULE ORAL 3 TIMES DAILY
Qty: 90 | Refills: 0 | Status: DISCONTINUED | COMMUNITY
Start: 2020-03-25 | End: 2020-06-03

## 2020-06-03 NOTE — REVIEW OF SYSTEMS
[Joint Stiffness] : no joint stiffness [Joint Pain] : joint pain [Muscle Pain] : no muscle pain [Muscle Weakness] : no muscle weakness [Negative] : Heme/Lymph [FreeTextEntry9] : lower left back discomfort

## 2020-06-03 NOTE — HISTORY OF PRESENT ILLNESS
[Disease: _____________________] : Disease: [unfilled] [de-identified] : Age 67: left breast cancer \par She had noticed left breast pain: burning sensation and felt lump. She had left breast 11:00 biopsy in 4/2018 which showed high grade malignant neoplasm that was JYOTSNA 3 positive, ER negative, WY 3%, and Rxi4kdt negative Ki 67 of 70%. She had MRI of the breast that showed satellite lesion in the left upper outer quadrant which showed DCIS cribiform pattern with low grade nuclear atypia that was ER >98% and WY 80%. Since her initial mammogram and MRI, she feels the breast mass is growing larger and more tender. She saw Dr Rossi who has referred her for neoadjuvant chemotherapy prior to breast conservation surgery. She had [facility] genetic testing that did not show any actionable mutations. She consented to dose dense ACT neoadjuvant chemotherapy and started on 7/12/18. She tolerated AC well but developed presyncopal event and neutropenia Grade 3 after 1st cycle of paclitaxel. She completed neoadjuvant chemotherapy 10/25/18. On 11/28/18, she underwent left lumpectomy with sentinel lymph node biopsy with Dr Rossi. The pathology showed no residual invasive carcinoma, biopsy changes, and negative 7 lymph nodes.  [AJCC Stage: ____] : AJCC Stage: [unfilled] [de-identified] : ER negative, NH 70%, Plo5tro negative  [de-identified] : She denies any new chest wall or breast changes. She noticed 1 month ago: new lump in the left lower back that resolved but notices occasional pain: worse with activity and better with rest and sleep. She denies any change in sleep position or muscle strain. She has not tried warm compress to area or any massage. She denies any new cough or SOB or new medications.  [de-identified] : Genetic testing: [facility]: no actionable mutations \par dose dense AC 7/12/18 to 8/23/18 \par dose dense paclitaxel: 9/6/18 -10/25\par 11/28/18- Dr. Rossi lumpectomy and sentinel lymph node biopsy \par RT Dr Patel: 2/2019

## 2020-06-03 NOTE — PHYSICAL EXAM
[Fully active, able to carry on all pre-disease performance without restriction] : Status 0 - Fully active, able to carry on all pre-disease performance without restriction [Normal] : affect appropriate [de-identified] : right cw mediport CDI   [de-identified] : lumpectomy and sentinel lymph node site; no palpable lymph nodes or new nodules  [de-identified] : when pt bends at waist; more prominent left lumbrosacral area; no reproducible pain on palpation over the C/T/L/ S spine

## 2020-06-03 NOTE — CONSULT LETTER
[Please see my note below.] : Please see my note below. [Dear  ___] : Dear  [unfilled], [Courtesy Letter:] : I had the pleasure of seeing your patient, [unfilled], in my office today. [Consult Closing:] : Thank you very much for allowing me to participate in the care of this patient.  If you have any questions, please do not hesitate to contact me. [Sincerely,] : Sincerely, [FreeTextEntry2] : Sara Rossi MD\par [address]\[place] [address] [FreeTextEntry3] : Arian Hussein MD\par Attending\par [facility]\par  [DrCassie  ___] : Dr. ANDERSON

## 2020-06-03 NOTE — ASSESSMENT
[FreeTextEntry1] : 68 y/o  BRCA negative F with left clinical Stage II to III breast cancer that is ER negative, [place] 3%, and Bew4ehq negative. She completed AC x 4 Taxol X 4 10/25/18. She had pathologic CR with her triple negative breast cancer. She completed RT to the breast and did not tolerate trial of anastrozole for ER/ KY positive for DCIS. We reviewed her left lower back pain which is most likely musculoskeletal: reviewed massage and gentle stretching. Will obtain tumor markers with blood work today. She will try supportive measures and if worsens over the next month, she will call and will consider imaging. Next follow up in 4 to 6 months.

## 2020-06-04 LAB
ALBUMIN SERPL ELPH-MCNC: 4.2 G/DL
ALP BLD-CCNC: 80 U/L
ALT SERPL-CCNC: 12 U/L
ANION GAP SERPL CALC-SCNC: 12 MMOL/L
AST SERPL-CCNC: 17 U/L
BILIRUB SERPL-MCNC: 0.3 MG/DL
BUN SERPL-MCNC: 24 MG/DL
CALCIUM SERPL-MCNC: 9.5 MG/DL
CANCER AG27-29 SERPL-ACNC: 19.3 U/ML
CEA SERPL-MCNC: 1.2 NG/ML
CHLORIDE SERPL-SCNC: 106 MMOL/L
CO2 SERPL-SCNC: 23 MMOL/L
CREAT SERPL-MCNC: 0.81 MG/DL
GLUCOSE SERPL-MCNC: 98 MG/DL
POTASSIUM SERPL-SCNC: 4.5 MMOL/L
PROT SERPL-MCNC: 6.4 G/DL
SARS-COV-2 IGG SERPL IA-ACNC: 0.01 INDEX
SARS-COV-2 IGG SERPL QL IA: NEGATIVE
SODIUM SERPL-SCNC: 142 MMOL/L

## 2020-06-23 ENCOUNTER — APPOINTMENT (OUTPATIENT)
Dept: RADIATION ONCOLOGY | Facility: CLINIC | Age: 70
End: 2020-06-23

## 2020-07-17 ENCOUNTER — APPOINTMENT (OUTPATIENT)
Dept: PULMONOLOGY | Facility: CLINIC | Age: 70
End: 2020-07-17
Payer: COMMERCIAL

## 2020-07-17 VITALS
SYSTOLIC BLOOD PRESSURE: 120 MMHG | DIASTOLIC BLOOD PRESSURE: 82 MMHG | HEIGHT: 61 IN | HEART RATE: 76 BPM | RESPIRATION RATE: 17 BRPM | BODY MASS INDEX: 36.44 KG/M2 | TEMPERATURE: 97.8 F | OXYGEN SATURATION: 97 % | WEIGHT: 193 LBS

## 2020-07-17 DIAGNOSIS — E66.9 OBESITY, UNSPECIFIED: ICD-10-CM

## 2020-07-17 DIAGNOSIS — J30.9 ALLERGIC RHINITIS, UNSPECIFIED: ICD-10-CM

## 2020-07-17 DIAGNOSIS — K21.9 GASTRO-ESOPHAGEAL REFLUX DISEASE W/OUT ESOPHAGITIS: ICD-10-CM

## 2020-07-17 DIAGNOSIS — R06.02 SHORTNESS OF BREATH: ICD-10-CM

## 2020-07-17 DIAGNOSIS — G47.62 SLEEP RELATED LEG CRAMPS: ICD-10-CM

## 2020-07-17 DIAGNOSIS — Z71.89 OTHER SPECIFIED COUNSELING: ICD-10-CM

## 2020-07-17 DIAGNOSIS — J45.909 UNSPECIFIED ASTHMA, UNCOMPLICATED: ICD-10-CM

## 2020-07-17 DIAGNOSIS — R06.83 SNORING: ICD-10-CM

## 2020-07-17 DIAGNOSIS — Z87.01 PERSONAL HISTORY OF PNEUMONIA (RECURRENT): ICD-10-CM

## 2020-07-17 PROCEDURE — 94618 PULMONARY STRESS TESTING: CPT

## 2020-07-17 PROCEDURE — 99214 OFFICE O/P EST MOD 30 MIN: CPT | Mod: 25

## 2020-07-17 NOTE — ADDENDUM
[FreeTextEntry1] : Documented by Annita Gerardo acting as a scribe for Dr. Андрей Rashid on 07/17/2020.\par \par All medical record entries made by the Scribe were at my, Dr. Андрей Rashid's, direction and personally dictated by me on 07/17/2020. I have reviewed the chart and agree that the record accurately reflects my personal performance of the history, physical exam, assessment and plan. I have also personally directed, reviewed, and agree with the discharge instructions.

## 2020-07-17 NOTE — PROCEDURE
[FreeTextEntry1] : 6 minute walk test reveals a low saturation of 95% with no evidence of dyspnea or fatigue; walked  521.6 meters\par

## 2020-07-17 NOTE — ASSESSMENT
[FreeTextEntry1] : Ms. Colunga is a 69 year old female with a history of asthma, allergy, GERD, s/p chemo RT for breast CA.  She is s/p chemotherapy/radiation therapy for breast cancer. She is now s/p of right lower lobe pneumonia. - Resolve SOB\par \par Her shortness of breath is stable, though multifactorial due to:\par -mild asthma\par -poor breathing mechanics\par -obesity\par -underlying cardiac disease\par \par  Problem 1A: Pneumonia (resolved)\par -s/p Ceftin 500 mg BID 10 days\par -s/p Doxycycline 100 mg BID 10 days \par - Follow up Xray (BAUDILIO)\par Your chest xray/CT reveals an infiltrate c/w pneumonia; this based on your clinical scenario required additional therapy. Any change in your status will require hospitalization at the nearest hospital and al local ambulance will need to be called. Our group is on staff at [facility] and [facility] as- consultants. The patient/family is instructed to notify our office with any change in condition. \par \par \par problem 1: mild asthma (controlled)\par -continue Spiriva at 2 inhalations QAM \par - continue Symbicort (160) 2 inhalations BID\par -continue to use ProAir PRN and before exercise\par -Asthma is believed to be caused by inherited (genetic) and environmental factor, but its exact cause is unknown. Asthma may be triggered by allergens, lung infections, or irritants in the air. Asthma triggers are different for each person \par -Inhaler technique reviewed as well as oral hygiene techniques reviewed with patient. Avoidance of cold air, extremes of temperature, rescue inhaler should be used before exercise. Order of medication reviewed with patient. Recommended use of a cool mist humidifier in the bedroom. \par \par problem 2: allergies and sinuses\par -continue to use Zaditor eye drops\par -continue to use OTC antihistamine PRN \par -Environmental measures for allergies were encouraged including mattress and pillow cover, air purifier, and environmental controls.\par \par problem 3: GERD\par -diet controlled, prior on Omeprazole and Zantac \par -Rule of 2s: avoid eating too much, eating too late, eating too spicy, eating two hours before bed\par -Things to avoid including overeating, spicy foods, tight clothing, eating within three hours of bed, this list is not all inclusive. \par -For treatment of reflux, possible options discussed including diet control, H2 blockers, PPIs, as well as coating motility agents discussed as treatment options. Timing of meals and proximity of last meal to sleep were discussed. If symptoms persist, a formal gastrointestinal evaluation is needed. \par \par problem 4: low vitamin D\par -continue to use vitamin D supplementation\par -Has been associated with asthma exacerbations and increased allergic symptoms. The goal based on recent information is maintaining levels between 50-70 and low normal is 30. Recommended 50,000 units every two weeks to once a month depending on the level. \par \par problem 5: breast cancer\par -s/p chemotherapy; s/p RT Rx\par \par problem 6: obesity - (improved)\par -s/p gastric sleeve surgery \par -improved \par -Weight loss, exercise, and diet control were discussed and are highly encouraged. Treatment options were given such as, aqua therapy, and contacting a nutritionist. Recommended to use the elliptical, stationary bike, less use of treadmill. Obesity is associated with worsening asthma, shortness of breath, and potential for cardiac disease, diabetes, and other underlying medical conditions. \par \par problem 7: nocturia\par -she is being recommended to avoid drinking water 3 hours before bed \par \par problem 8: snoring - (quiet)\par -resolved with weight loss \par Good sleep hygiene was encouraged including avoiding watching television an hour before bed, keeping caffeine at a low, avoiding reading, television, or anything, in bed, no drinking any liquids three hours before bedtime, and only getting into bed when tired and ready for sleep. \par \par Problem 9: Health Maintenance/COVID19 Precautions:\par - Clean your hands often. Wash your hands often with soap and water for at least 20 seconds, especially after blowing your nose, coughing, or sneezing, or having been in a public place.\par - If soap and water are not available, use a hand  that contains at least 60% alcohol.\par - To the extent possible, avoid touching high-touch surfaces in public places - elevator buttons, door handles, handrails, handshaking with people, etc. Use a tissue or your sleeve to cover your hand or finger if you must touch something.\par - Wash your hands after touching surfaces in public places.\par - Avoid touching your face, nose, eyes, etc.\par - Clean and disinfect your home to remove germs: practice routine cleaning of frequently touched surfaces (for example: tables, doorknobs, light switches, handles, desks, toilets, faucets, sinks & cell phones)\par - Avoid crowds, especially in poorly ventilated spaces. Your risk of exposure to respiratory viruses like COVID-19 may increase in crowded, closed-in settings with little air circulation if there are people in the crowd who are sick. All patients are recommended to practice social distancing and stay at least 6 feet away from others.\par - Avoid all non-essential travel including plane trips, and especially avoid embarking on cruise ships.\par -If COVID-19 is spreading in your community, take extra measures to put distance between yourself and other people to further reduce your risk of being exposed to this new virus.\par -Stay home as much as possible.\par - Consider ways of getting food brought to your house through family, social, or commercial networks\par -Be aware that the virus has been known to live in the air up to 3 hours post exposure, cardboard up to 24 hours post exposure, copper up to 4 hours post exposure, steel and plastic up to 2-3 days post exposure. Risk of transmission from these surfaces are affected by many variables.\par Immune Support Recommendations:\par -OTC Vitamin C 500mg BID \par -OTC Quercetin 250-500mg BID \par -OTC Zinc 75-100mg per day \par -OTC Melatonin 1 or 2 mg a night \par -OTC Vitamin D 1-4000mg per day \par -OTC Tonic Water 8oz per day\par Asthma and COVID19:\par You need to make sure your asthma is under control. This often requires the use of inhaled corticosteroids (and sometimes oral corticosteroids). Inhaled corticosteroids do not likely reduce your immune system’s ability to fight infections, but oral corticosteroids may. It is important to use the steps above to protect yourself to limit your exposure to any respiratory virus.\par \par problem 10: health maintenance \par -s/p influenza vaccine (01.08.2019)\par -recommended strep pneumonia vaccines: Prevnar-13 vaccine (refused in past), followed by Pneumo vaccine 23 one year following\par -recommended early intervention for URIs\par -recommended regular osteoporosis evaluations\par -recommended early dermatological evaluations\par -recommended after the age of 50 to the age of 70, colonoscopy every 5 years \par \par F/u in 3 months - SPI / DLCO\par She is encouraged to call with any changes, concerns, or questions.

## 2020-07-17 NOTE — PHYSICAL EXAM
[No Acute Distress] : no acute distress [No Neck Mass] : no neck mass [Normal Rate/Rhythm] : normal rate/rhythm [Normal S1, S2] : normal s1, s2 [No Murmurs] : no murmurs [No Resp Distress] : no resp distress [No Abnormalities] : no abnormalities [Clear to Auscultation Bilaterally] : clear to auscultation bilaterally [Benign] : benign [Normal Gait] : normal gait [No Clubbing] : no clubbing [No Edema] : no edema [No Cyanosis] : no cyanosis [FROM] : FROM [Normal Color/ Pigmentation] : normal color/ pigmentation [Oriented x3] : oriented x3 [General Appearance - Well Developed] : well developed [Normal Affect] : normal affect [Normal Appearance] : normal appearance [Well Groomed] : well groomed [General Appearance - In No Acute Distress] : no acute distress [General Appearance - Well Nourished] : well nourished [No Deformities] : no deformities [Eyelids - No Xanthelasma] : the eyelids demonstrated no xanthelasmas [Normal Conjunctiva] : the conjunctiva exhibited no abnormalities [Normal Oropharynx] : normal oropharynx [II] : II [Neck Appearance] : the appearance of the neck was normal [Neck Cervical Mass (___cm)] : no neck mass was observed [Jugular Venous Distention Increased] : there was no jugular-venous distention [Thyroid Nodule] : there were no palpable thyroid nodules [Thyroid Diffuse Enlargement] : the thyroid was not enlarged [Heart Rate And Rhythm] : heart rate and rhythm were normal [Murmurs] : no murmurs present [Heart Sounds] : normal S1 and S2 [Respiration, Rhythm And Depth] : normal respiratory rhythm and effort [Auscultation Breath Sounds / Voice Sounds] : lungs were clear to auscultation bilaterally [Exaggerated Use Of Accessory Muscles For Inspiration] : no accessory muscle use [Abdomen Tenderness] : non-tender [Abdomen Soft] : soft [Abdomen Mass (___ Cm)] : no abdominal mass palpated [Gait - Sufficient For Exercise Testing] : the gait was sufficient for exercise testing [Abnormal Walk] : normal gait [Nail Clubbing] : no clubbing of the fingernails [Cyanosis, Localized] : no localized cyanosis [Petechial Hemorrhages (___cm)] : no petechial hemorrhages [No Venous Stasis] : no venous stasis [Skin Color & Pigmentation] : normal skin color and pigmentation [] : no rash [No Skin Ulcers] : no skin ulcer [Skin Lesions] : no skin lesions [No Xanthoma] : no  xanthoma was observed [Sensation] : the sensory exam was normal to light touch and pinprick [Deep Tendon Reflexes (DTR)] : deep tendon reflexes were 2+ and symmetric [No Focal Deficits] : no focal deficits [Oriented To Time, Place, And Person] : oriented to person, place, and time [Impaired Insight] : insight and judgment were intact [Affect] : the affect was normal [FreeTextEntry1] : I:E ratio 1:3; clear

## 2020-07-17 NOTE — HISTORY OF PRESENT ILLNESS
[FreeTextEntry1] : Ms. Colunga is a 69 year old female with a history of allergic rhinitis, asthma, chronic fatigue, GERD, obesity, and snoring presenting to the office today for a follow up visit. Today her chief complain is:\par - she has been feeling generally well\par - her energy levels have been good, about 9 out of 10\par - she has been sleeping well \par - bowels regular \par - she notes occasionally having heart burn \par - she has been getting enough sleep\par - she denies any coughing / wheezing \par - she denies any snoring \par - her sinuses have been well \par - she has been walking and riding her bike for exercise \par -she denies any headaches, nausea, vomiting, fever, chills, sweats, chest pain, chest pressure, diarrhea, constipation, dysphagia, dizziness, sour taste in the mouth, leg swelling, leg pain, itchy eyes, itchy ears, myalgias or arthralgias.

## 2020-08-10 ENCOUNTER — APPOINTMENT (OUTPATIENT)
Dept: RADIATION ONCOLOGY | Facility: CLINIC | Age: 70
End: 2020-08-10
Payer: COMMERCIAL

## 2020-08-10 PROCEDURE — 99213 OFFICE O/P EST LOW 20 MIN: CPT | Mod: GC

## 2020-08-10 NOTE — PHYSICAL EXAM
[Normal] : normal skin color and pigmentation and no rash [Breast Abnormal Lactation (Galactorrhea)] : no nipple discharge [No UE Edema] : there is no upper extremity edema [Supraclavicular Lymph Nodes Enlarged Bilaterally] : supraclavicular [Axillary Lymph Nodes Enlarged Bilaterally] : axillary [de-identified] : Well-healed left lumpectomy scar, no erythema, well-healed right chemotherapy port scar.

## 2020-08-10 NOTE — HISTORY OF PRESENT ILLNESS
[FreeTextEntry1] : Ms. Colunga is a 68 years old female with clinical T2N0 left breast DCIS. ER negative, [place] 3%, and Her-2 negative s/p neoadjuvant chemotherapy completed on 10/25/18 with complete pathological response. s/p left breast lumpectomy with SLN biopsy.\par She received Radiation Therapy to a total of 5240 cGy from 1/18/19 -2/19/19, did not develop any grade 3 or higher RT toxicity.\par \par She presents for a follow up. Today, she notes fatigue with overexertion and slight left upper outer breast tenderness to palpation. She saw Dr. Hussein in September and has been off  AI due to worsening migraines. Mammo 5/8/19 showed no mammographic evidence of malignancy in either breast. She followed up with Dr. Rossi in May and will see her annually. She is scheduled for mammogram next week, which was delayed due to COVID.

## 2020-08-10 NOTE — REVIEW OF SYSTEMS
[Negative] : Heme/Lymph [Alopecia: Grade 0] : Alopecia: Grade 0 [Skin Atrophy: Grade 0] : Skin Atrophy: Grade 0 [Pruritus: Grade 0] : Pruritus: Grade 0 [Skin Induration: Grade 0] : Skin Induration: Grade 0 [Skin Hyperpigmentation: Grade 0] : Skin Hyperpigmentation: Grade 0 [Dermatitis Radiation: Grade 0] : Dermatitis Radiation: Grade 0 [FreeTextEntry9] : back pain improved.

## 2020-08-18 ENCOUNTER — APPOINTMENT (OUTPATIENT)
Dept: MAMMOGRAPHY | Facility: IMAGING CENTER | Age: 70
End: 2020-08-18
Payer: COMMERCIAL

## 2020-08-18 ENCOUNTER — APPOINTMENT (OUTPATIENT)
Dept: ULTRASOUND IMAGING | Facility: IMAGING CENTER | Age: 70
End: 2020-08-18
Payer: COMMERCIAL

## 2020-08-18 ENCOUNTER — OUTPATIENT (OUTPATIENT)
Dept: OUTPATIENT SERVICES | Facility: HOSPITAL | Age: 70
LOS: 1 days | End: 2020-08-18
Payer: COMMERCIAL

## 2020-08-18 DIAGNOSIS — Z98.89 OTHER SPECIFIED POSTPROCEDURAL STATES: Chronic | ICD-10-CM

## 2020-08-18 DIAGNOSIS — Z90.3 ACQUIRED ABSENCE OF STOMACH [PART OF]: Chronic | ICD-10-CM

## 2020-08-18 DIAGNOSIS — Z98.890 OTHER SPECIFIED POSTPROCEDURAL STATES: Chronic | ICD-10-CM

## 2020-08-18 DIAGNOSIS — Z00.8 ENCOUNTER FOR OTHER GENERAL EXAMINATION: ICD-10-CM

## 2020-08-18 DIAGNOSIS — C50.919 MALIGNANT NEOPLASM OF UNSPECIFIED SITE OF UNSPECIFIED FEMALE BREAST: Chronic | ICD-10-CM

## 2020-08-18 DIAGNOSIS — Z90.49 ACQUIRED ABSENCE OF OTHER SPECIFIED PARTS OF DIGESTIVE TRACT: Chronic | ICD-10-CM

## 2020-08-18 PROCEDURE — G0279: CPT

## 2020-08-18 PROCEDURE — 77066 DX MAMMO INCL CAD BI: CPT | Mod: 26

## 2020-08-18 PROCEDURE — G0279: CPT | Mod: 26

## 2020-08-18 PROCEDURE — 77066 DX MAMMO INCL CAD BI: CPT

## 2020-08-31 ENCOUNTER — RESULT REVIEW (OUTPATIENT)
Age: 70
End: 2020-08-31

## 2020-09-19 ENCOUNTER — OUTPATIENT (OUTPATIENT)
Dept: OUTPATIENT SERVICES | Facility: HOSPITAL | Age: 70
LOS: 1 days | End: 2020-09-19
Payer: COMMERCIAL

## 2020-09-19 ENCOUNTER — APPOINTMENT (OUTPATIENT)
Dept: MRI IMAGING | Facility: IMAGING CENTER | Age: 70
End: 2020-09-19
Payer: COMMERCIAL

## 2020-09-19 DIAGNOSIS — Z90.49 ACQUIRED ABSENCE OF OTHER SPECIFIED PARTS OF DIGESTIVE TRACT: Chronic | ICD-10-CM

## 2020-09-19 DIAGNOSIS — Z98.89 OTHER SPECIFIED POSTPROCEDURAL STATES: Chronic | ICD-10-CM

## 2020-09-19 DIAGNOSIS — Z98.890 OTHER SPECIFIED POSTPROCEDURAL STATES: Chronic | ICD-10-CM

## 2020-09-19 DIAGNOSIS — C50.919 MALIGNANT NEOPLASM OF UNSPECIFIED SITE OF UNSPECIFIED FEMALE BREAST: Chronic | ICD-10-CM

## 2020-09-19 DIAGNOSIS — Z90.3 ACQUIRED ABSENCE OF STOMACH [PART OF]: Chronic | ICD-10-CM

## 2020-09-19 DIAGNOSIS — Z00.8 ENCOUNTER FOR OTHER GENERAL EXAMINATION: ICD-10-CM

## 2020-09-19 PROCEDURE — 77049 MRI BREAST C-+ W/CAD BI: CPT | Mod: 26

## 2020-09-19 PROCEDURE — C8937: CPT

## 2020-09-19 PROCEDURE — A9585: CPT

## 2020-09-19 PROCEDURE — C8908: CPT

## 2020-11-17 ENCOUNTER — APPOINTMENT (OUTPATIENT)
Dept: PULMONOLOGY | Facility: CLINIC | Age: 70
End: 2020-11-17

## 2020-12-02 ENCOUNTER — OUTPATIENT (OUTPATIENT)
Dept: OUTPATIENT SERVICES | Facility: HOSPITAL | Age: 70
LOS: 1 days | Discharge: HOME | End: 2020-12-02

## 2020-12-02 DIAGNOSIS — Z98.890 OTHER SPECIFIED POSTPROCEDURAL STATES: Chronic | ICD-10-CM

## 2020-12-02 DIAGNOSIS — C50.919 MALIGNANT NEOPLASM OF UNSPECIFIED SITE OF UNSPECIFIED FEMALE BREAST: ICD-10-CM

## 2020-12-02 DIAGNOSIS — Z98.89 OTHER SPECIFIED POSTPROCEDURAL STATES: Chronic | ICD-10-CM

## 2020-12-02 DIAGNOSIS — C50.919 MALIGNANT NEOPLASM OF UNSPECIFIED SITE OF UNSPECIFIED FEMALE BREAST: Chronic | ICD-10-CM

## 2020-12-02 DIAGNOSIS — Z90.49 ACQUIRED ABSENCE OF OTHER SPECIFIED PARTS OF DIGESTIVE TRACT: Chronic | ICD-10-CM

## 2020-12-02 DIAGNOSIS — Z90.3 ACQUIRED ABSENCE OF STOMACH [PART OF]: Chronic | ICD-10-CM

## 2020-12-07 ENCOUNTER — RESULT REVIEW (OUTPATIENT)
Age: 70
End: 2020-12-07

## 2020-12-07 ENCOUNTER — APPOINTMENT (OUTPATIENT)
Dept: HEMATOLOGY ONCOLOGY | Facility: CLINIC | Age: 70
End: 2020-12-07
Payer: COMMERCIAL

## 2020-12-07 VITALS
HEIGHT: 62.2 IN | DIASTOLIC BLOOD PRESSURE: 82 MMHG | HEART RATE: 69 BPM | SYSTOLIC BLOOD PRESSURE: 133 MMHG | TEMPERATURE: 97.6 F | RESPIRATION RATE: 16 BRPM | OXYGEN SATURATION: 100 % | BODY MASS INDEX: 36.17 KG/M2 | WEIGHT: 199.06 LBS

## 2020-12-07 LAB
ALBUMIN SERPL ELPH-MCNC: 4.4 G/DL
ALP BLD-CCNC: 101 U/L
ALT SERPL-CCNC: 7 U/L
ANION GAP SERPL CALC-SCNC: 12 MMOL/L
AST SERPL-CCNC: 15 U/L
BASOPHILS # BLD AUTO: 0.06 K/UL — SIGNIFICANT CHANGE UP (ref 0–0.2)
BASOPHILS NFR BLD AUTO: 1 % — SIGNIFICANT CHANGE UP (ref 0–2)
BILIRUB SERPL-MCNC: 0.4 MG/DL
BUN SERPL-MCNC: 16 MG/DL
CALCIUM SERPL-MCNC: 9.3 MG/DL
CEA SERPL-MCNC: 1.5 NG/ML
CHLORIDE SERPL-SCNC: 106 MMOL/L
CO2 SERPL-SCNC: 22 MMOL/L
CREAT SERPL-MCNC: 0.76 MG/DL
EOSINOPHIL # BLD AUTO: 0.16 K/UL — SIGNIFICANT CHANGE UP (ref 0–0.5)
EOSINOPHIL NFR BLD AUTO: 2.8 % — SIGNIFICANT CHANGE UP (ref 0–6)
GLUCOSE SERPL-MCNC: 102 MG/DL
HCT VFR BLD CALC: 40 % — SIGNIFICANT CHANGE UP (ref 34.5–45)
HGB BLD-MCNC: 12.8 G/DL — SIGNIFICANT CHANGE UP (ref 11.5–15.5)
IMM GRANULOCYTES NFR BLD AUTO: 0.3 % — SIGNIFICANT CHANGE UP (ref 0–1.5)
LYMPHOCYTES # BLD AUTO: 1.04 K/UL — SIGNIFICANT CHANGE UP (ref 1–3.3)
LYMPHOCYTES # BLD AUTO: 18.1 % — SIGNIFICANT CHANGE UP (ref 13–44)
MCHC RBC-ENTMCNC: 29.4 PG — SIGNIFICANT CHANGE UP (ref 27–34)
MCHC RBC-ENTMCNC: 32 G/DL — SIGNIFICANT CHANGE UP (ref 32–36)
MCV RBC AUTO: 91.7 FL — SIGNIFICANT CHANGE UP (ref 80–100)
MONOCYTES # BLD AUTO: 0.64 K/UL — SIGNIFICANT CHANGE UP (ref 0–0.9)
MONOCYTES NFR BLD AUTO: 11.1 % — SIGNIFICANT CHANGE UP (ref 2–14)
NEUTROPHILS # BLD AUTO: 3.83 K/UL — SIGNIFICANT CHANGE UP (ref 1.8–7.4)
NEUTROPHILS NFR BLD AUTO: 66.7 % — SIGNIFICANT CHANGE UP (ref 43–77)
NRBC # BLD: 0 /100 WBCS — SIGNIFICANT CHANGE UP (ref 0–0)
PLATELET # BLD AUTO: 306 K/UL — SIGNIFICANT CHANGE UP (ref 150–400)
POTASSIUM SERPL-SCNC: 4.3 MMOL/L
PROT SERPL-MCNC: 6.5 G/DL
RBC # BLD: 4.36 M/UL — SIGNIFICANT CHANGE UP (ref 3.8–5.2)
RBC # FLD: 13.2 % — SIGNIFICANT CHANGE UP (ref 10.3–14.5)
SODIUM SERPL-SCNC: 141 MMOL/L
WBC # BLD: 5.75 K/UL — SIGNIFICANT CHANGE UP (ref 3.8–10.5)
WBC # FLD AUTO: 5.75 K/UL — SIGNIFICANT CHANGE UP (ref 3.8–10.5)

## 2020-12-07 PROCEDURE — 99213 OFFICE O/P EST LOW 20 MIN: CPT

## 2020-12-07 PROCEDURE — 99072 ADDL SUPL MATRL&STAF TM PHE: CPT

## 2020-12-07 RX ORDER — TIOTROPIUM BROMIDE 18 UG/1
18 CAPSULE ORAL; RESPIRATORY (INHALATION)
Qty: 3 | Refills: 1 | Status: DISCONTINUED | COMMUNITY
Start: 2020-04-17 | End: 2020-12-07

## 2020-12-07 RX ORDER — BUDESONIDE AND FORMOTEROL FUMARATE DIHYDRATE 160; 4.5 UG/1; UG/1
160-4.5 AEROSOL RESPIRATORY (INHALATION) TWICE DAILY
Qty: 3 | Refills: 1 | Status: DISCONTINUED | COMMUNITY
Start: 2020-03-27 | End: 2020-12-07

## 2020-12-07 NOTE — ASSESSMENT
[FreeTextEntry1] : 71 y/o  BRCA negative F with left clinical Stage II to III breast cancer that is ER negative, [place] 3%, and Unj1yfj negative. She completed AC x 4 Taxol X 4 10/25/18. She had pathologic CR with her triple negative breast cancer. She completed RT to the breast and did not tolerate trial of anastrozole for ER/ LA positive for DCIS. She remains on surveillance without any new signs or symptoms of breast cancer recurrence. We reviewed her breast imaging and no new findings. We will obtain interval Echo after anthracycline therapy next year when covid numbers are lower. Next follow up in 6 months but earlier if any new symptoms.\par

## 2020-12-07 NOTE — HISTORY OF PRESENT ILLNESS
[Disease: _____________________] : Disease: [unfilled] [AJCC Stage: ____] : AJCC Stage: [unfilled] [de-identified] : Age 67: left breast cancer \par She had noticed left breast pain: burning sensation and felt lump. She had left breast 11:00 biopsy in 4/2018 which showed high grade malignant neoplasm that was JYOTSNA 3 positive, ER negative, [place] 3%, and Yzb4ugt negative Ki 67 of 70%. She had MRI of the breast that showed satellite lesion in the left upper outer quadrant which showed DCIS cribiform pattern with low grade nuclear atypia that was ER >98% and AL 80%. Since her initial mammogram and MRI, she feels the breast mass is growing larger and more tender. She saw Dr Rossi who has referred her for neoadjuvant chemotherapy prior to breast conservation surgery. She had [facility] genetic testing that did not show any actionable mutations. She consented to dose dense ACT neoadjuvant chemotherapy and started on 7/12/18. She tolerated AC well but developed presyncopal event and neutropenia Grade 3 after 1st cycle of paclitaxel. She completed neoadjuvant chemotherapy 10/25/18. On 11/28/18, she underwent left lumpectomy with sentinel lymph node biopsy with Dr Rossi. The pathology showed no residual invasive carcinoma, biopsy changes, and negative 7 lymph nodes.  [de-identified] : ER negative, KY 70%, Pil6lrw negative  [de-identified] : Genetic testing: [facility]: no actionable mutations \par dose dense AC 7/12/18 to 8/23/18 \par dose dense paclitaxel: 9/6/18 -10/25\par 11/28/18- Dr. Rossi lumpectomy and sentinel lymph node biopsy \par RT Dr Patel: 2/2019 [de-identified] : She has been feeling well. Denies any new changes in the breast or new chest wall pain. Does have defect after surgery and RT and had mammogram/ sonogram along with MRI of the breast done on 9/2020 which did not show any changes. Denies any headaches, bone pain or leg swelling/ palpitations. No new health changes. She has not seen PCP since before covid. She will be getting flu shot at local pharmacy. She has good energy and able to sleep.

## 2020-12-07 NOTE — PHYSICAL EXAM
[Fully active, able to carry on all pre-disease performance without restriction] : Status 0 - Fully active, able to carry on all pre-disease performance without restriction [Normal] : full range of motion and no deformities appreciated [de-identified] : lumpectomy and sentinel lymph node site with left surgical defect over the upper outer left breast; no palpable lymph nodes or new nodules

## 2020-12-07 NOTE — REASON FOR VISIT
[Follow-Up Visit] : a follow-up [FreeTextEntry2] : follow up for breast cancer s/p completion of chemotherapy and RT

## 2020-12-08 LAB — CANCER AG27-29 SERPL-ACNC: 25.1 U/ML

## 2021-01-04 ENCOUNTER — APPOINTMENT (OUTPATIENT)
Dept: PULMONOLOGY | Facility: CLINIC | Age: 71
End: 2021-01-04

## 2021-02-22 NOTE — H&P PST ADULT - CIGARETTES, PACKS/DAY
PCP: Kota Alcala MD    Last appt: 6/3/2020  Future Appointments   Date Time Provider Gabino Reynolds   3/12/2021 10:15 AM Kota Alcala MD PAFP [facility]       Requested Prescriptions     Pending Prescriptions Disp Refills    glucose blood VI test strips (blood glucose test) strip 300 Strip 0     Sig: Any brand, for uncontrolled DM E11.21 check up to 3x daily 0.1

## 2021-05-17 NOTE — PRE-ANESTHESIA EVALUATION ADULT - NSANTHAIRWAYFT_ENT_ALL_CORE
Patient resolved from [address] Transitions episode on Date/Time:  5/17/2021 2:19 PM      Discussed COVID-19 related testing which was Blank Single Select Template: not done at [facility]. Test results were Blank Single Select Template: positive. Patient informed of results, if available? yes    Patient/family has been provided the following resources and education related to COVID-19:                         Signs, symptoms and red flags related to COVID-19            CDC exposure and quarantine guidelines            [employer] exposure contact - [phone]            Contact for their local Department of Health                 Patient currently reports that the following symptoms have improved:  Patient states she feels good. No viral symtpoms at all during this time. Pt states she feels back to normal self. .    No further outreach scheduled with this LPN Care Coordinator. Episode of Care resolved. Patient has this LPN Care Coordinator contact information if future needs arise. Good neck ROM

## 2021-06-14 ENCOUNTER — APPOINTMENT (OUTPATIENT)
Dept: HEMATOLOGY ONCOLOGY | Facility: CLINIC | Age: 71
End: 2021-06-14
Payer: COMMERCIAL

## 2021-06-14 ENCOUNTER — OUTPATIENT (OUTPATIENT)
Dept: OUTPATIENT SERVICES | Facility: HOSPITAL | Age: 71
LOS: 1 days | Discharge: HOME | End: 2021-06-14

## 2021-06-14 VITALS
BODY MASS INDEX: 36.05 KG/M2 | WEIGHT: 198.42 LBS | OXYGEN SATURATION: 95 % | TEMPERATURE: 97.7 F | SYSTOLIC BLOOD PRESSURE: 145 MMHG | DIASTOLIC BLOOD PRESSURE: 82 MMHG | HEIGHT: 62.2 IN | RESPIRATION RATE: 16 BRPM | HEART RATE: 76 BPM

## 2021-06-14 DIAGNOSIS — C50.919 MALIGNANT NEOPLASM OF UNSPECIFIED SITE OF UNSPECIFIED FEMALE BREAST: Chronic | ICD-10-CM

## 2021-06-14 DIAGNOSIS — Z98.890 OTHER SPECIFIED POSTPROCEDURAL STATES: Chronic | ICD-10-CM

## 2021-06-14 DIAGNOSIS — C50.919 MALIGNANT NEOPLASM OF UNSPECIFIED SITE OF UNSPECIFIED FEMALE BREAST: ICD-10-CM

## 2021-06-14 DIAGNOSIS — Z90.49 ACQUIRED ABSENCE OF OTHER SPECIFIED PARTS OF DIGESTIVE TRACT: Chronic | ICD-10-CM

## 2021-06-14 DIAGNOSIS — Z98.89 OTHER SPECIFIED POSTPROCEDURAL STATES: Chronic | ICD-10-CM

## 2021-06-14 DIAGNOSIS — Z90.3 ACQUIRED ABSENCE OF STOMACH [PART OF]: Chronic | ICD-10-CM

## 2021-06-14 PROCEDURE — 99072 ADDL SUPL MATRL&STAF TM PHE: CPT

## 2021-06-14 PROCEDURE — 99213 OFFICE O/P EST LOW 20 MIN: CPT

## 2021-06-19 NOTE — PHYSICAL EXAM
[Fully active, able to carry on all pre-disease performance without restriction] : Status 0 - Fully active, able to carry on all pre-disease performance without restriction [Normal] : affect appropriate [de-identified] : lumpectomy and sentinel lymph node site with left surgical defect over the upper outer left breast; no palpable lymph nodes or new nodules

## 2021-06-19 NOTE — ASSESSMENT
[FreeTextEntry1] : 71 y/o  BRCA negative F with left clinical Stage II to III breast cancer that is ER negative, [place] 3%, and Obw8umi negative. She completed AC x 4 Taxol X 4 10/25/18. She had pathologic CR with her triple negative breast cancer. She completed RT to the breast and did not tolerate trial of anastrozole for ER/ OR positive for DCIS. She remains on surveillance without any new signs or symptoms of breast cancer recurrence. She will continue with low fat diet and exercise. Next follow up in 6 months but earlier if any new symptoms. Will obtain blood work with next visit.

## 2021-08-30 ENCOUNTER — APPOINTMENT (OUTPATIENT)
Dept: MAMMOGRAPHY | Facility: IMAGING CENTER | Age: 71
End: 2021-08-30
Payer: COMMERCIAL

## 2021-08-30 ENCOUNTER — OUTPATIENT (OUTPATIENT)
Dept: OUTPATIENT SERVICES | Facility: HOSPITAL | Age: 71
LOS: 1 days | End: 2021-08-30
Payer: COMMERCIAL

## 2021-08-30 ENCOUNTER — APPOINTMENT (OUTPATIENT)
Dept: ULTRASOUND IMAGING | Facility: IMAGING CENTER | Age: 71
End: 2021-08-30
Payer: COMMERCIAL

## 2021-08-30 DIAGNOSIS — Z90.49 ACQUIRED ABSENCE OF OTHER SPECIFIED PARTS OF DIGESTIVE TRACT: Chronic | ICD-10-CM

## 2021-08-30 DIAGNOSIS — Z98.89 OTHER SPECIFIED POSTPROCEDURAL STATES: Chronic | ICD-10-CM

## 2021-08-30 DIAGNOSIS — Z98.890 OTHER SPECIFIED POSTPROCEDURAL STATES: Chronic | ICD-10-CM

## 2021-08-30 DIAGNOSIS — Z00.8 ENCOUNTER FOR OTHER GENERAL EXAMINATION: ICD-10-CM

## 2021-08-30 DIAGNOSIS — Z90.3 ACQUIRED ABSENCE OF STOMACH [PART OF]: Chronic | ICD-10-CM

## 2021-08-30 DIAGNOSIS — C50.919 MALIGNANT NEOPLASM OF UNSPECIFIED SITE OF UNSPECIFIED FEMALE BREAST: Chronic | ICD-10-CM

## 2021-08-30 PROCEDURE — 76641 ULTRASOUND BREAST COMPLETE: CPT

## 2021-08-30 PROCEDURE — 77066 DX MAMMO INCL CAD BI: CPT

## 2021-08-30 PROCEDURE — 76641 ULTRASOUND BREAST COMPLETE: CPT | Mod: 26,50

## 2021-08-30 PROCEDURE — G0279: CPT

## 2021-08-30 PROCEDURE — G0279: CPT | Mod: 26

## 2021-08-30 PROCEDURE — 77066 DX MAMMO INCL CAD BI: CPT | Mod: 26

## 2021-12-09 ENCOUNTER — OUTPATIENT (OUTPATIENT)
Dept: OUTPATIENT SERVICES | Facility: HOSPITAL | Age: 71
LOS: 1 days | Discharge: HOME | End: 2021-12-09

## 2021-12-09 DIAGNOSIS — Z90.49 ACQUIRED ABSENCE OF OTHER SPECIFIED PARTS OF DIGESTIVE TRACT: Chronic | ICD-10-CM

## 2021-12-09 DIAGNOSIS — Z98.89 OTHER SPECIFIED POSTPROCEDURAL STATES: Chronic | ICD-10-CM

## 2021-12-09 DIAGNOSIS — Z98.890 OTHER SPECIFIED POSTPROCEDURAL STATES: Chronic | ICD-10-CM

## 2021-12-09 DIAGNOSIS — C50.919 MALIGNANT NEOPLASM OF UNSPECIFIED SITE OF UNSPECIFIED FEMALE BREAST: Chronic | ICD-10-CM

## 2021-12-09 DIAGNOSIS — Z90.3 ACQUIRED ABSENCE OF STOMACH [PART OF]: Chronic | ICD-10-CM

## 2021-12-09 DIAGNOSIS — C50.919 MALIGNANT NEOPLASM OF UNSPECIFIED SITE OF UNSPECIFIED FEMALE BREAST: ICD-10-CM

## 2021-12-13 ENCOUNTER — APPOINTMENT (OUTPATIENT)
Dept: HEMATOLOGY ONCOLOGY | Facility: CLINIC | Age: 71
End: 2021-12-13
Payer: COMMERCIAL

## 2021-12-13 ENCOUNTER — RESULT REVIEW (OUTPATIENT)
Age: 71
End: 2021-12-13

## 2021-12-13 VITALS
HEIGHT: 62.2 IN | HEART RATE: 64 BPM | WEIGHT: 205.98 LBS | OXYGEN SATURATION: 99 % | RESPIRATION RATE: 16 BRPM | SYSTOLIC BLOOD PRESSURE: 133 MMHG | DIASTOLIC BLOOD PRESSURE: 84 MMHG | BODY MASS INDEX: 37.43 KG/M2 | TEMPERATURE: 97.2 F

## 2021-12-13 LAB
ALBUMIN SERPL ELPH-MCNC: 4.5 G/DL
ALP BLD-CCNC: 83 U/L
ALT SERPL-CCNC: 13 U/L
ANION GAP SERPL CALC-SCNC: 14 MMOL/L
AST SERPL-CCNC: 26 U/L
BASOPHILS # BLD AUTO: 0.07 K/UL — SIGNIFICANT CHANGE UP (ref 0–0.2)
BASOPHILS NFR BLD AUTO: 0.9 % — SIGNIFICANT CHANGE UP (ref 0–2)
BILIRUB SERPL-MCNC: 0.4 MG/DL
BUN SERPL-MCNC: 17 MG/DL
CALCIUM SERPL-MCNC: 9.6 MG/DL
CEA SERPL-MCNC: 2.3 NG/ML
CHLORIDE SERPL-SCNC: 102 MMOL/L
CO2 SERPL-SCNC: 24 MMOL/L
CREAT SERPL-MCNC: 1.13 MG/DL
EOSINOPHIL # BLD AUTO: 0.29 K/UL — SIGNIFICANT CHANGE UP (ref 0–0.5)
EOSINOPHIL NFR BLD AUTO: 3.9 % — SIGNIFICANT CHANGE UP (ref 0–6)
GLUCOSE SERPL-MCNC: 92 MG/DL
HCT VFR BLD CALC: 38.1 % — SIGNIFICANT CHANGE UP (ref 34.5–45)
HGB BLD-MCNC: 12.6 G/DL — SIGNIFICANT CHANGE UP (ref 11.5–15.5)
IMM GRANULOCYTES NFR BLD AUTO: 0.4 % — SIGNIFICANT CHANGE UP (ref 0–1.5)
LYMPHOCYTES # BLD AUTO: 1.27 K/UL — SIGNIFICANT CHANGE UP (ref 1–3.3)
LYMPHOCYTES # BLD AUTO: 17.1 % — SIGNIFICANT CHANGE UP (ref 13–44)
MCHC RBC-ENTMCNC: 32.1 PG — SIGNIFICANT CHANGE UP (ref 27–34)
MCHC RBC-ENTMCNC: 33.1 G/DL — SIGNIFICANT CHANGE UP (ref 32–36)
MCV RBC AUTO: 97.2 FL — SIGNIFICANT CHANGE UP (ref 80–100)
MONOCYTES # BLD AUTO: 0.61 K/UL — SIGNIFICANT CHANGE UP (ref 0–0.9)
MONOCYTES NFR BLD AUTO: 8.2 % — SIGNIFICANT CHANGE UP (ref 2–14)
NEUTROPHILS # BLD AUTO: 5.14 K/UL — SIGNIFICANT CHANGE UP (ref 1.8–7.4)
NEUTROPHILS NFR BLD AUTO: 69.5 % — SIGNIFICANT CHANGE UP (ref 43–77)
NRBC # BLD: 0 /100 WBCS — SIGNIFICANT CHANGE UP (ref 0–0)
PLATELET # BLD AUTO: 307 K/UL — SIGNIFICANT CHANGE UP (ref 150–400)
POTASSIUM SERPL-SCNC: 4.6 MMOL/L
PROT SERPL-MCNC: 7 G/DL
RBC # BLD: 3.92 M/UL — SIGNIFICANT CHANGE UP (ref 3.8–5.2)
RBC # FLD: 13.2 % — SIGNIFICANT CHANGE UP (ref 10.3–14.5)
SODIUM SERPL-SCNC: 141 MMOL/L
WBC # BLD: 7.41 K/UL — SIGNIFICANT CHANGE UP (ref 3.8–10.5)
WBC # FLD AUTO: 7.41 K/UL — SIGNIFICANT CHANGE UP (ref 3.8–10.5)

## 2021-12-13 PROCEDURE — 99213 OFFICE O/P EST LOW 20 MIN: CPT

## 2021-12-14 ENCOUNTER — NON-APPOINTMENT (OUTPATIENT)
Age: 71
End: 2021-12-14

## 2021-12-14 LAB — CANCER AG27-29 SERPL-ACNC: 42.5 U/ML

## 2022-01-11 NOTE — PHYSICAL EXAM
[Fully active, able to carry on all pre-disease performance without restriction] : Status 0 - Fully active, able to carry on all pre-disease performance without restriction [Normal] : affect appropriate [de-identified] : lumpectomy and sentinel lymph node site with left surgical defect over the upper outer left breast; no palpable lymph nodes or new nodules

## 2022-01-11 NOTE — HISTORY OF PRESENT ILLNESS
[Disease: _____________________] : Disease: [unfilled] [AJCC Stage: ____] : AJCC Stage: [unfilled] [de-identified] : Age 67: left breast cancer \par She had noticed left breast pain: burning sensation and felt lump. She had left breast 11:00 biopsy in 4/2018 which showed high grade malignant neoplasm that was JYOTSNA 3 positive, ER negative, [place] 3%, and Kox5bwk negative Ki 67 of 70%. She had MRI of the breast that showed satellite lesion in the left upper outer quadrant which showed DCIS cribiform pattern with low grade nuclear atypia that was ER >98% and [place] 80%. Since her initial mammogram and MRI, she feels the breast mass is growing larger and more tender. She saw Dr Rossi who has referred her for neoadjuvant chemotherapy prior to breast conservation surgery. She had [employer] genetic testing that did not show any actionable mutations. She consented to dose dense ACT neoadjuvant chemotherapy and started on 7/12/18. She tolerated AC well but developed presyncopal event and neutropenia Grade 3 after 1st cycle of paclitaxel. She completed neoadjuvant chemotherapy 10/25/18. On 11/28/18, she underwent left lumpectomy with sentinel lymph node biopsy with Dr Rossi. The pathology showed no residual invasive carcinoma, biopsy changes, and negative 7 lymph nodes.  [de-identified] : ER negative, [place] 3%, Oob5bqh negative  [de-identified] : Genetic testing: [facility]: no actionable mutations \par dose dense AC 7/12/18 to 8/23/18 \par dose dense paclitaxel: 9/6/18 -10/25\par 11/28/18- Dr. Rossi lumpectomy and sentinel lymph node biopsy \par RT Dr Patel: 2/2019 [de-identified] : Since last evaluation, she has developed food intolerances: noticing loose stool: used to be with dairy products but noticing with other food. No blood in stool. She denies any new breast pain or changes. She had last breast imaging in August 2021. No new cough or back pain. She is taking probiotic hoping the symptoms will improve.

## 2022-01-11 NOTE — ASSESSMENT
[FreeTextEntry1] : 70 y/o  BRCA negative F with left clinical Stage II to III breast cancer that is ER negative, LA 3%, and Zqq6xmn negative. She completed AC x 4 Taxol X 4 10/25/18. She had pathologic CR with her triple negative breast cancer. She completed RT to the breast and did not tolerate trial of anastrozole for ER/ LA positive for DCIS. She remains on surveillance without any new signs or symptoms of breast cancer recurrence. Will obtain interval tumor markers and CBC. She will follow up with GI regarding worsening food intolerances. She will continue with low fat diet and exercise. Next follow up in 6 months but earlier if any new symptoms.\par

## 2022-01-11 NOTE — CONSULT LETTER
[Dear  ___] : Dear  [unfilled], [Courtesy Letter:] : I had the pleasure of seeing your patient, [unfilled], in my office today. [Please see my note below.] : Please see my note below. [Consult Closing:] : Thank you very much for allowing me to participate in the care of this patient.  If you have any questions, please do not hesitate to contact me. [Sincerely,] : Sincerely, [DrCassie  ___] : Dr. ANDERSON [FreeTextEntry2] : Sara Rossi MD\par [address]\[place] [address]  [FreeTextEntry3] : Arian Hussein MD\par Attending\par [facility]\par

## 2022-01-12 ENCOUNTER — RESULT REVIEW (OUTPATIENT)
Age: 72
End: 2022-01-12

## 2022-01-15 ENCOUNTER — APPOINTMENT (OUTPATIENT)
Dept: CT IMAGING | Facility: IMAGING CENTER | Age: 72
End: 2022-01-15

## 2022-01-25 ENCOUNTER — RESULT REVIEW (OUTPATIENT)
Age: 72
End: 2022-01-25

## 2022-01-26 ENCOUNTER — APPOINTMENT (OUTPATIENT)
Dept: NUCLEAR MEDICINE | Facility: IMAGING CENTER | Age: 72
End: 2022-01-26
Payer: COMMERCIAL

## 2022-01-26 ENCOUNTER — APPOINTMENT (OUTPATIENT)
Dept: CT IMAGING | Facility: IMAGING CENTER | Age: 72
End: 2022-01-26
Payer: COMMERCIAL

## 2022-01-26 ENCOUNTER — OUTPATIENT (OUTPATIENT)
Dept: OUTPATIENT SERVICES | Facility: HOSPITAL | Age: 72
LOS: 1 days | End: 2022-01-26
Payer: COMMERCIAL

## 2022-01-26 DIAGNOSIS — C50.912 MALIGNANT NEOPLASM OF UNSPECIFIED SITE OF LEFT FEMALE BREAST: ICD-10-CM

## 2022-01-26 DIAGNOSIS — Z90.3 ACQUIRED ABSENCE OF STOMACH [PART OF]: Chronic | ICD-10-CM

## 2022-01-26 DIAGNOSIS — Z98.89 OTHER SPECIFIED POSTPROCEDURAL STATES: Chronic | ICD-10-CM

## 2022-01-26 DIAGNOSIS — Z98.890 OTHER SPECIFIED POSTPROCEDURAL STATES: Chronic | ICD-10-CM

## 2022-01-26 DIAGNOSIS — C50.919 MALIGNANT NEOPLASM OF UNSPECIFIED SITE OF UNSPECIFIED FEMALE BREAST: Chronic | ICD-10-CM

## 2022-01-26 DIAGNOSIS — R97.8 OTHER ABNORMAL TUMOR MARKERS: ICD-10-CM

## 2022-01-26 DIAGNOSIS — Z90.49 ACQUIRED ABSENCE OF OTHER SPECIFIED PARTS OF DIGESTIVE TRACT: Chronic | ICD-10-CM

## 2022-01-26 PROCEDURE — 78830 RP LOCLZJ TUM SPECT W/CT 1: CPT | Mod: 26

## 2022-01-26 PROCEDURE — 74177 CT ABD & PELVIS W/CONTRAST: CPT | Mod: 26

## 2022-01-26 PROCEDURE — 74177 CT ABD & PELVIS W/CONTRAST: CPT

## 2022-01-26 PROCEDURE — 78306 BONE IMAGING WHOLE BODY: CPT | Mod: 26

## 2022-01-26 PROCEDURE — 78306 BONE IMAGING WHOLE BODY: CPT

## 2022-01-26 PROCEDURE — A9561: CPT

## 2022-01-26 PROCEDURE — 71260 CT THORAX DX C+: CPT | Mod: 26

## 2022-01-26 PROCEDURE — 78830 RP LOCLZJ TUM SPECT W/CT 1: CPT

## 2022-01-26 PROCEDURE — 82565 ASSAY OF CREATININE: CPT

## 2022-01-26 PROCEDURE — 71260 CT THORAX DX C+: CPT

## 2022-01-28 ENCOUNTER — NON-APPOINTMENT (OUTPATIENT)
Age: 72
End: 2022-01-28

## 2022-03-14 ENCOUNTER — OUTPATIENT (OUTPATIENT)
Dept: OUTPATIENT SERVICES | Facility: HOSPITAL | Age: 72
LOS: 1 days | Discharge: HOME | End: 2022-03-14

## 2022-03-14 DIAGNOSIS — Z98.890 OTHER SPECIFIED POSTPROCEDURAL STATES: Chronic | ICD-10-CM

## 2022-03-14 DIAGNOSIS — C50.919 MALIGNANT NEOPLASM OF UNSPECIFIED SITE OF UNSPECIFIED FEMALE BREAST: ICD-10-CM

## 2022-03-14 DIAGNOSIS — Z90.3 ACQUIRED ABSENCE OF STOMACH [PART OF]: Chronic | ICD-10-CM

## 2022-03-14 DIAGNOSIS — Z98.89 OTHER SPECIFIED POSTPROCEDURAL STATES: Chronic | ICD-10-CM

## 2022-03-14 DIAGNOSIS — Z90.49 ACQUIRED ABSENCE OF OTHER SPECIFIED PARTS OF DIGESTIVE TRACT: Chronic | ICD-10-CM

## 2022-03-14 DIAGNOSIS — C50.919 MALIGNANT NEOPLASM OF UNSPECIFIED SITE OF UNSPECIFIED FEMALE BREAST: Chronic | ICD-10-CM

## 2022-03-16 ENCOUNTER — APPOINTMENT (OUTPATIENT)
Dept: HEMATOLOGY ONCOLOGY | Facility: CLINIC | Age: 72
End: 2022-03-16
Payer: COMMERCIAL

## 2022-03-16 ENCOUNTER — RESULT REVIEW (OUTPATIENT)
Age: 72
End: 2022-03-16

## 2022-03-16 VITALS
RESPIRATION RATE: 16 BRPM | WEIGHT: 212.97 LBS | BODY MASS INDEX: 38.7 KG/M2 | OXYGEN SATURATION: 99 % | HEART RATE: 60 BPM | TEMPERATURE: 97.2 F | DIASTOLIC BLOOD PRESSURE: 84 MMHG | SYSTOLIC BLOOD PRESSURE: 132 MMHG

## 2022-03-16 DIAGNOSIS — R97.8 OTHER ABNORMAL TUMOR MARKERS: ICD-10-CM

## 2022-03-16 LAB
BASOPHILS # BLD AUTO: 0.08 K/UL — SIGNIFICANT CHANGE UP (ref 0–0.2)
BASOPHILS NFR BLD AUTO: 1.1 % — SIGNIFICANT CHANGE UP (ref 0–2)
EOSINOPHIL # BLD AUTO: 0.22 K/UL — SIGNIFICANT CHANGE UP (ref 0–0.5)
EOSINOPHIL NFR BLD AUTO: 3 % — SIGNIFICANT CHANGE UP (ref 0–6)
HCT VFR BLD CALC: 39.3 % — SIGNIFICANT CHANGE UP (ref 34.5–45)
HGB BLD-MCNC: 12.8 G/DL — SIGNIFICANT CHANGE UP (ref 11.5–15.5)
IMM GRANULOCYTES NFR BLD AUTO: 0.4 % — SIGNIFICANT CHANGE UP (ref 0–1.5)
LYMPHOCYTES # BLD AUTO: 1.23 K/UL — SIGNIFICANT CHANGE UP (ref 1–3.3)
LYMPHOCYTES # BLD AUTO: 16.7 % — SIGNIFICANT CHANGE UP (ref 13–44)
MCHC RBC-ENTMCNC: 31.4 PG — SIGNIFICANT CHANGE UP (ref 27–34)
MCHC RBC-ENTMCNC: 32.6 G/DL — SIGNIFICANT CHANGE UP (ref 32–36)
MCV RBC AUTO: 96.3 FL — SIGNIFICANT CHANGE UP (ref 80–100)
MONOCYTES # BLD AUTO: 0.59 K/UL — SIGNIFICANT CHANGE UP (ref 0–0.9)
MONOCYTES NFR BLD AUTO: 8 % — SIGNIFICANT CHANGE UP (ref 2–14)
NEUTROPHILS # BLD AUTO: 5.2 K/UL — SIGNIFICANT CHANGE UP (ref 1.8–7.4)
NEUTROPHILS NFR BLD AUTO: 70.8 % — SIGNIFICANT CHANGE UP (ref 43–77)
NRBC # BLD: 0 /100 WBCS — SIGNIFICANT CHANGE UP (ref 0–0)
PLATELET # BLD AUTO: 270 K/UL — SIGNIFICANT CHANGE UP (ref 150–400)
RBC # BLD: 4.08 M/UL — SIGNIFICANT CHANGE UP (ref 3.8–5.2)
RBC # FLD: 13.7 % — SIGNIFICANT CHANGE UP (ref 10.3–14.5)
WBC # BLD: 7.35 K/UL — SIGNIFICANT CHANGE UP (ref 3.8–10.5)
WBC # FLD AUTO: 7.35 K/UL — SIGNIFICANT CHANGE UP (ref 3.8–10.5)

## 2022-03-16 PROCEDURE — 99214 OFFICE O/P EST MOD 30 MIN: CPT

## 2022-03-16 NOTE — REVIEW OF SYSTEMS
[Joint Pain] : joint pain [Negative] : Allergic/Immunologic [Joint Stiffness] : no joint stiffness [Muscle Pain] : no muscle pain [Muscle Weakness] : no muscle weakness [FreeTextEntry9] : R hip pain

## 2022-03-16 NOTE — CONSULT LETTER
[Dear  ___] : Dear  [unfilled], [Courtesy Letter:] : I had the pleasure of seeing your patient, [unfilled], in my office today. [Please see my note below.] : Please see my note below. [Consult Closing:] : Thank you very much for allowing me to participate in the care of this patient.  If you have any questions, please do not hesitate to contact me. [Sincerely,] : Sincerely, [FreeTextEntry2] : Sara Rossi MD\par [address] [facility] [address]\[place] [address]  [FreeTextEntry3] : Arian Hussein MD\par Attending\par [facility]\par

## 2022-03-16 NOTE — PHYSICAL EXAM
[Fully active, able to carry on all pre-disease performance without restriction] : Status 0 - Fully active, able to carry on all pre-disease performance without restriction [Normal] : affect appropriate [de-identified] : lumpectomy and sentinel lymph node site with left surgical defect over the upper outer left breast; no palpable lymph nodes or new nodules  [de-identified] : no pain with abduction/ adduction of the R hip

## 2022-03-16 NOTE — HISTORY OF PRESENT ILLNESS
[Disease: _____________________] : Disease: [unfilled] [AJCC Stage: ____] : AJCC Stage: [unfilled] [de-identified] : Age 67: left breast cancer \par She had noticed left breast pain: burning sensation and felt lump. She had left breast 11:00 biopsy in 4/2018 which showed high grade malignant neoplasm that was JYOTSNA 3 positive, ER negative, HI 3%, and Yfg5hvm negative Ki 67 of 70%. She had MRI of the breast that showed satellite lesion in the left upper outer quadrant which showed DCIS cribiform pattern with low grade nuclear atypia that was ER >98% and HI 80%. Since her initial mammogram and MRI, she feels the breast mass is growing larger and more tender. She saw Dr Rossi who has referred her for neoadjuvant chemotherapy prior to breast conservation surgery. She had [facility] genetic testing that did not show any actionable mutations. She consented to dose dense ACT neoadjuvant chemotherapy and started on 7/12/18. She tolerated AC well but developed presyncopal event and neutropenia Grade 3 after 1st cycle of paclitaxel. She completed neoadjuvant chemotherapy 10/25/18. On 11/28/18, she underwent left lumpectomy with sentinel lymph node biopsy with Dr Rossi. The pathology showed no residual invasive carcinoma, biopsy changes, and negative 7 lymph nodes. Had mild elevation in CA 27-29 and had CT and bone scan done 12/2021 that showed no disease.  [de-identified] : ER negative, WV 3%, Mwa8czq negative  [de-identified] : Genetic testing: [facility]: no actionable mutations \par dose dense AC 7/12/18 to 8/23/18 \par dose dense paclitaxel: 9/6/18 -10/25\par 11/28/18- Dr. Rossi lumpectomy and sentinel lymph node biopsy \par RT Dr Patel: 2/2019 [de-identified] : She has been having R hip pain: has not seen ortho yet. Feels pain is worse in the am: tightness that seems to go down side of leg and wrap around the knees. Pain resolves on its own. Denies any new breast changes or cough or headaches. She denies any new medications. Last mammo/ sonogram 8/2021.

## 2022-03-16 NOTE — ASSESSMENT
[FreeTextEntry1] : 70 y/o  BRCA negative F with left clinical Stage II to III breast cancer that is ER negative, WV 3%, and Ypz0mqd negative. She completed AC x 4 Taxol X 4 10/25/18. She had pathologic CR with her triple negative breast cancer. She completed RT to the breast and did not tolerate trial of anastrozole for ER/ WV positive for DCIS. She remains on surveillance. We reviewed CT imaging done in 12/2021. We reviewed signs and symptoms of breast cancer recurrence. Will repeat tumor markers. She will follow up with orthopedics for R hip pain. She will continue with low fat diet and exercise. Will follow up in June unless new symptoms.

## 2022-03-18 DIAGNOSIS — E53.8 DEFICIENCY OF OTHER SPECIFIED B GROUP VITAMINS: ICD-10-CM

## 2022-03-18 LAB
ALBUMIN SERPL ELPH-MCNC: 4.6 G/DL
ALP BLD-CCNC: 76 U/L
ALT SERPL-CCNC: 23 U/L
ANION GAP SERPL CALC-SCNC: 12 MMOL/L
AST SERPL-CCNC: 29 U/L
BILIRUB SERPL-MCNC: 0.3 MG/DL
BUN SERPL-MCNC: 15 MG/DL
CALCIUM SERPL-MCNC: 9.1 MG/DL
CANCER AG27-29 SERPL-ACNC: 39 U/ML
CEA SERPL-MCNC: 3 NG/ML
CHLORIDE SERPL-SCNC: 107 MMOL/L
CO2 SERPL-SCNC: 25 MMOL/L
CREAT SERPL-MCNC: 1.1 MG/DL
EGFR: 54 ML/MIN/1.73M2
GLUCOSE SERPL-MCNC: 87 MG/DL
POTASSIUM SERPL-SCNC: 4.3 MMOL/L
PROT SERPL-MCNC: 6.8 G/DL
SODIUM SERPL-SCNC: 144 MMOL/L
TSH SERPL-ACNC: 153 UIU/ML
VIT B12 SERPL-MCNC: 308 PG/ML

## 2022-03-21 LAB
T3FREE SERPL-MCNC: 0.75 PG/ML
T4 FREE SERPL-MCNC: 0.1 NG/DL
THYROPEROXIDASE AB SERPL IA-ACNC: 526 IU/ML

## 2022-03-24 ENCOUNTER — APPOINTMENT (OUTPATIENT)
Dept: ULTRASOUND IMAGING | Facility: CLINIC | Age: 72
End: 2022-03-24
Payer: COMMERCIAL

## 2022-03-24 ENCOUNTER — OUTPATIENT (OUTPATIENT)
Dept: OUTPATIENT SERVICES | Facility: HOSPITAL | Age: 72
LOS: 1 days | End: 2022-03-24
Payer: COMMERCIAL

## 2022-03-24 DIAGNOSIS — Z90.49 ACQUIRED ABSENCE OF OTHER SPECIFIED PARTS OF DIGESTIVE TRACT: Chronic | ICD-10-CM

## 2022-03-24 DIAGNOSIS — Z98.890 OTHER SPECIFIED POSTPROCEDURAL STATES: Chronic | ICD-10-CM

## 2022-03-24 DIAGNOSIS — Z98.89 OTHER SPECIFIED POSTPROCEDURAL STATES: Chronic | ICD-10-CM

## 2022-03-24 DIAGNOSIS — Z90.3 ACQUIRED ABSENCE OF STOMACH [PART OF]: Chronic | ICD-10-CM

## 2022-03-24 DIAGNOSIS — E03.9 HYPOTHYROIDISM, UNSPECIFIED: ICD-10-CM

## 2022-03-24 DIAGNOSIS — C50.919 MALIGNANT NEOPLASM OF UNSPECIFIED SITE OF UNSPECIFIED FEMALE BREAST: Chronic | ICD-10-CM

## 2022-03-24 PROCEDURE — 76536 US EXAM OF HEAD AND NECK: CPT | Mod: 26

## 2022-03-24 PROCEDURE — 76536 US EXAM OF HEAD AND NECK: CPT

## 2022-04-04 ENCOUNTER — APPOINTMENT (OUTPATIENT)
Dept: HEMATOLOGY ONCOLOGY | Facility: CLINIC | Age: 72
End: 2022-04-04

## 2022-04-04 ENCOUNTER — RESULT REVIEW (OUTPATIENT)
Age: 72
End: 2022-04-04

## 2022-04-04 LAB
BASOPHILS # BLD AUTO: 0.06 K/UL — SIGNIFICANT CHANGE UP (ref 0–0.2)
BASOPHILS NFR BLD AUTO: 1 % — SIGNIFICANT CHANGE UP (ref 0–2)
EOSINOPHIL # BLD AUTO: 0.3 K/UL — SIGNIFICANT CHANGE UP (ref 0–0.5)
EOSINOPHIL NFR BLD AUTO: 4.9 % — SIGNIFICANT CHANGE UP (ref 0–6)
HCT VFR BLD CALC: 38.8 % — SIGNIFICANT CHANGE UP (ref 34.5–45)
HGB BLD-MCNC: 12.5 G/DL — SIGNIFICANT CHANGE UP (ref 11.5–15.5)
IMM GRANULOCYTES NFR BLD AUTO: 0.2 % — SIGNIFICANT CHANGE UP (ref 0–1.5)
LYMPHOCYTES # BLD AUTO: 0.99 K/UL — LOW (ref 1–3.3)
LYMPHOCYTES # BLD AUTO: 16.1 % — SIGNIFICANT CHANGE UP (ref 13–44)
MCHC RBC-ENTMCNC: 31.4 PG — SIGNIFICANT CHANGE UP (ref 27–34)
MCHC RBC-ENTMCNC: 32.2 G/DL — SIGNIFICANT CHANGE UP (ref 32–36)
MCV RBC AUTO: 97.5 FL — SIGNIFICANT CHANGE UP (ref 80–100)
MONOCYTES # BLD AUTO: 0.57 K/UL — SIGNIFICANT CHANGE UP (ref 0–0.9)
MONOCYTES NFR BLD AUTO: 9.3 % — SIGNIFICANT CHANGE UP (ref 2–14)
NEUTROPHILS # BLD AUTO: 4.22 K/UL — SIGNIFICANT CHANGE UP (ref 1.8–7.4)
NEUTROPHILS NFR BLD AUTO: 68.5 % — SIGNIFICANT CHANGE UP (ref 43–77)
NRBC # BLD: 0 /100 WBCS — SIGNIFICANT CHANGE UP (ref 0–0)
PLATELET # BLD AUTO: 261 K/UL — SIGNIFICANT CHANGE UP (ref 150–400)
RBC # BLD: 3.98 M/UL — SIGNIFICANT CHANGE UP (ref 3.8–5.2)
RBC # FLD: 13.7 % — SIGNIFICANT CHANGE UP (ref 10.3–14.5)
WBC # BLD: 6.15 K/UL — SIGNIFICANT CHANGE UP (ref 3.8–10.5)
WBC # FLD AUTO: 6.15 K/UL — SIGNIFICANT CHANGE UP (ref 3.8–10.5)

## 2022-04-05 LAB
T3FREE SERPL-MCNC: 1.15 PG/ML
T4 FREE SERPL-MCNC: 0.3 NG/DL
TSH SERPL-ACNC: 117 UIU/ML

## 2022-04-05 RX ORDER — LEVOTHYROXINE SODIUM 0.03 MG/1
25 TABLET ORAL DAILY
Qty: 30 | Refills: 0 | Status: DISCONTINUED | COMMUNITY
Start: 2022-03-18 | End: 2022-04-05

## 2022-04-19 ENCOUNTER — OUTPATIENT (OUTPATIENT)
Dept: OUTPATIENT SERVICES | Facility: HOSPITAL | Age: 72
LOS: 1 days | Discharge: HOME | End: 2022-04-19

## 2022-04-19 DIAGNOSIS — Z98.89 OTHER SPECIFIED POSTPROCEDURAL STATES: Chronic | ICD-10-CM

## 2022-04-19 DIAGNOSIS — Z98.890 OTHER SPECIFIED POSTPROCEDURAL STATES: Chronic | ICD-10-CM

## 2022-04-19 DIAGNOSIS — Z90.3 ACQUIRED ABSENCE OF STOMACH [PART OF]: Chronic | ICD-10-CM

## 2022-04-19 DIAGNOSIS — C50.919 MALIGNANT NEOPLASM OF UNSPECIFIED SITE OF UNSPECIFIED FEMALE BREAST: Chronic | ICD-10-CM

## 2022-04-19 DIAGNOSIS — C50.919 MALIGNANT NEOPLASM OF UNSPECIFIED SITE OF UNSPECIFIED FEMALE BREAST: ICD-10-CM

## 2022-04-19 DIAGNOSIS — Z90.49 ACQUIRED ABSENCE OF OTHER SPECIFIED PARTS OF DIGESTIVE TRACT: Chronic | ICD-10-CM

## 2022-04-25 ENCOUNTER — RESULT REVIEW (OUTPATIENT)
Age: 72
End: 2022-04-25

## 2022-04-25 ENCOUNTER — APPOINTMENT (OUTPATIENT)
Dept: HEMATOLOGY ONCOLOGY | Facility: CLINIC | Age: 72
End: 2022-04-25

## 2022-04-25 LAB
BASOPHILS # BLD AUTO: 0.06 K/UL — SIGNIFICANT CHANGE UP (ref 0–0.2)
BASOPHILS NFR BLD AUTO: 1 % — SIGNIFICANT CHANGE UP (ref 0–2)
EOSINOPHIL # BLD AUTO: 0.25 K/UL — SIGNIFICANT CHANGE UP (ref 0–0.5)
EOSINOPHIL NFR BLD AUTO: 4.1 % — SIGNIFICANT CHANGE UP (ref 0–6)
HCT VFR BLD CALC: 38.6 % — SIGNIFICANT CHANGE UP (ref 34.5–45)
HGB BLD-MCNC: 12.4 G/DL — SIGNIFICANT CHANGE UP (ref 11.5–15.5)
IMM GRANULOCYTES NFR BLD AUTO: 0.3 % — SIGNIFICANT CHANGE UP (ref 0–1.5)
LYMPHOCYTES # BLD AUTO: 1 K/UL — SIGNIFICANT CHANGE UP (ref 1–3.3)
LYMPHOCYTES # BLD AUTO: 16.3 % — SIGNIFICANT CHANGE UP (ref 13–44)
MCHC RBC-ENTMCNC: 31.2 PG — SIGNIFICANT CHANGE UP (ref 27–34)
MCHC RBC-ENTMCNC: 32.1 G/DL — SIGNIFICANT CHANGE UP (ref 32–36)
MCV RBC AUTO: 97 FL — SIGNIFICANT CHANGE UP (ref 80–100)
MONOCYTES # BLD AUTO: 0.66 K/UL — SIGNIFICANT CHANGE UP (ref 0–0.9)
MONOCYTES NFR BLD AUTO: 10.7 % — SIGNIFICANT CHANGE UP (ref 2–14)
NEUTROPHILS # BLD AUTO: 4.16 K/UL — SIGNIFICANT CHANGE UP (ref 1.8–7.4)
NEUTROPHILS NFR BLD AUTO: 67.6 % — SIGNIFICANT CHANGE UP (ref 43–77)
NRBC # BLD: 0 /100 WBCS — SIGNIFICANT CHANGE UP (ref 0–0)
PLATELET # BLD AUTO: 267 K/UL — SIGNIFICANT CHANGE UP (ref 150–400)
RBC # BLD: 3.98 M/UL — SIGNIFICANT CHANGE UP (ref 3.8–5.2)
RBC # FLD: 13.6 % — SIGNIFICANT CHANGE UP (ref 10.3–14.5)
T3FREE SERPL-MCNC: 2.34 PG/ML
T4 FREE SERPL-MCNC: 1.3 NG/DL
TSH SERPL-ACNC: 41.4 UIU/ML
WBC # BLD: 6.15 K/UL — SIGNIFICANT CHANGE UP (ref 3.8–10.5)
WBC # FLD AUTO: 6.15 K/UL — SIGNIFICANT CHANGE UP (ref 3.8–10.5)

## 2022-04-25 RX ORDER — LEVOTHYROXINE SODIUM 0.07 MG/1
75 TABLET ORAL
Qty: 30 | Refills: 1 | Status: DISCONTINUED | COMMUNITY
Start: 2022-04-05 | End: 2022-04-25

## 2022-05-09 NOTE — HISTORY OF PRESENT ILLNESS
LOV 4-  NOV 7-    Refill request for    HumaLOG KwikPen 100 UNIT/ML pen-injector 30 mL 5 4/14/2022     Sig: Inject 1 unit for every 8 grams of carbohydrates plus scale of 1 unit for every 30 above 130. Â MAX daily dose 60 units/day.       Patient requesting a 90 day supply [Disease: _____________________] : Disease: [unfilled] [AJCC Stage: ____] : AJCC Stage: [unfilled] [de-identified] : Age 67: left breast cancer \par She had noticed left breast pain: burning sensation and felt lump. She had left breast 11:00 biopsy in 4/2018 which showed high grade malignant neoplasm that was JYOTSNA 3 positive, ER negative, MT 3%, and Egt4dhn negative Ki 67 of 70%. She had MRI of the breast that showed satellite lesion in the left upper outer quadrant which showed DCIS cribiform pattern with low grade nuclear atypia that was ER >98% and MT 80%. Since her initial mammogram and MRI, she feels the breast mass is growing larger and more tender. She saw Dr Rossi who has referred her for neoadjuvant chemotherapy prior to breast conservation surgery. She had [facility] genetic testing that did not show any actionable mutations. She consented to dose dense ACT neoadjuvant chemotherapy and started on 7/12/18. She tolerated AC well but developed presyncopal event and neutropenia Grade 3 after 1st cycle of paclitaxel. She completed neoadjuvant chemotherapy 10/25/18. On 11/28/18, she underwent left lumpectomy with sentinel lymph node biopsy with Dr Rossi. The pathology showed no residual invasive carcinoma, biopsy changes, and negative 7 lymph nodes.  [de-identified] : ER negative, MI 70%, Jgf2vgg negative  [de-identified] : Genetic testing: [facility]: no actionable mutations \par dose dense AC 7/12/18 to 8/23/18 \par dose dense paclitaxel: 9/6/18 -10/25\par 11/28/18- Dr. Rossi lumpectomy and sentinel lymph node biopsy \par RT Dr Patel: 2/2019 [de-identified] : She had interval blood work with her PCP recently. Denies any new breast pain or chest wall changes. She denies any new headaches, vision changes, bone pain or cough. She denies any new health changes or medications.

## 2022-05-11 ENCOUNTER — APPOINTMENT (OUTPATIENT)
Dept: ENDOCRINOLOGY | Facility: CLINIC | Age: 72
End: 2022-05-11
Payer: COMMERCIAL

## 2022-05-11 ENCOUNTER — NON-APPOINTMENT (OUTPATIENT)
Age: 72
End: 2022-05-11

## 2022-05-11 VITALS
HEIGHT: 62 IN | BODY MASS INDEX: 39.2 KG/M2 | WEIGHT: 213 LBS | HEART RATE: 84 BPM | DIASTOLIC BLOOD PRESSURE: 94 MMHG | SYSTOLIC BLOOD PRESSURE: 163 MMHG | OXYGEN SATURATION: 95 %

## 2022-05-11 PROCEDURE — 99204 OFFICE O/P NEW MOD 45 MIN: CPT

## 2022-05-15 NOTE — ASSESSMENT
[Levothyroxine] : The patient was instructed to take Levothyroxine on an empty stomach, separate from vitamins, and wait at least 30 minutes before eating Mid-Level Procedure Text (C): After obtaining clear surgical margins the patient was sent to a mid-level provider for surgical repair.  The patient understands they will receive post-surgical care and follow-up from the mid-level provider. [FreeTextEntry1] : Patient presents for management of abnormal thyroid function. \par H/o recent COVID infection 2 weeks ago, however noted hypothyroid TFTs since March 2022, not likely thyroiditis.\par Has h/o breast cancer, but denies h/o recent chemotherapy or immunotherapy, admitted to RT in 2019.\par Reviewed recent TFTs from 4/25/22, TSH in hypothyroid range at 41, however improving from previous TSH of 153 since starting on levothyroxine. \par Currently on levothyroxine 100mcg daily (increased by oncology on 4/25/22)\par Will repeat TFTs, including TPO Ab, in 6 weeks and will adjust dose of levothyroxine accordingly.\par \par Answered all questions today; patient verbalized understanding of the above\par RTC in 6-8 weeks.

## 2022-05-15 NOTE — HISTORY OF PRESENT ILLNESS
[FreeTextEntry1] : ROX SOMMER  is a 70 yo female with past medical history of obesity s/p gastric sleeve in 2017, breast cancer, hypothyroidism, GERD, and asthma who presents for management of hypothyroidism.\par \par Patient notes she was recently diagnosed by PCP as she was found to have hypothyroidism on routine bloodwork. She denies any known prior thyroid disease or use of thyroid medication in past. Due to recent thyroid blood results, she is now on levothyroxine 100mcg daily.\par \par She has history of breast cancer, she notes she is not on chemotherapy or immunotherapy, s/p RT in Feb 2019, she notes she had 1 month of RT sessions.Patient feels cold all the time, notes blurry vision, she saw an ophthalmologist but she is unsure if she had a clear diagnosis. She denies dyspnea, dysphagia, dysphonia, denies changes in bowel habits including diarrhea or constipation.  She gained 15lbs since Jan 2022.  She notes recent COVID infection 2 weeks ago, she had mild to little symptoms and has since recovered.\par \par Family Hx: Sister- hypothyroidism, DM

## 2022-05-15 NOTE — PHYSICAL EXAM
[Alert] : alert [Obese] : obese [No Acute Distress] : no acute distress [Well Developed] : well developed [Normal Sclera/Conjunctiva] : normal sclera/conjunctiva [EOMI] : extra ocular movement intact [No Proptosis] : no proptosis [No Lid Lag] : no lid lag [Normal Hearing] : hearing was normal [No LAD] : no lymphadenopathy [Supple] : the neck was supple [Thyroid Not Enlarged] : the thyroid was not enlarged [No Thyroid Nodules] : no palpable thyroid nodules [No Respiratory Distress] : no respiratory distress [No Accessory Muscle Use] : no accessory muscle use [Normal Rate and Effort] : normal respiratory rate and effort [Clear to Auscultation] : lungs were clear to auscultation bilaterally [Normal S1, S2] : normal S1 and S2 [No Murmurs] : no murmurs [Normal Rate] : heart rate was normal [Regular Rhythm] : with a regular rhythm [Normal Supraclavicular Nodes] : no supraclavicular lymphadenopathy [Normal Anterior Cervical Nodes] : no anterior cervical lymphadenopathy [Normal Posterior Cervical Nodes] : no posterior cervical lymphadenopathy [No Stigmata of Cushings Syndrome] : no stigmata of Cushings Syndrome [Normal Gait] : normal gait [No Clubbing, Cyanosis] : no clubbing  or cyanosis of the fingernails [No Involuntary Movements] : no involuntary movements were seen [No Rash] : no rash [Abdominal Striae] : no abdominal striae [Acne] : no acne [Hirsutism] : no hirsutism [Normal Reflexes] : deep tendon reflexes were 2+ and symmetric [No Tremors] : no tremors [Oriented x3] : oriented to person, place, and time [Normal Insight/Judgement] : insight and judgment were intact

## 2022-05-17 ENCOUNTER — APPOINTMENT (OUTPATIENT)
Dept: MRI IMAGING | Facility: IMAGING CENTER | Age: 72
End: 2022-05-17
Payer: COMMERCIAL

## 2022-05-17 ENCOUNTER — OUTPATIENT (OUTPATIENT)
Dept: OUTPATIENT SERVICES | Facility: HOSPITAL | Age: 72
LOS: 1 days | End: 2022-05-17
Payer: COMMERCIAL

## 2022-05-17 DIAGNOSIS — Z98.890 OTHER SPECIFIED POSTPROCEDURAL STATES: Chronic | ICD-10-CM

## 2022-05-17 DIAGNOSIS — Z90.3 ACQUIRED ABSENCE OF STOMACH [PART OF]: Chronic | ICD-10-CM

## 2022-05-17 DIAGNOSIS — Z90.49 ACQUIRED ABSENCE OF OTHER SPECIFIED PARTS OF DIGESTIVE TRACT: Chronic | ICD-10-CM

## 2022-05-17 DIAGNOSIS — Z98.89 OTHER SPECIFIED POSTPROCEDURAL STATES: Chronic | ICD-10-CM

## 2022-05-17 DIAGNOSIS — C50.919 MALIGNANT NEOPLASM OF UNSPECIFIED SITE OF UNSPECIFIED FEMALE BREAST: Chronic | ICD-10-CM

## 2022-05-17 DIAGNOSIS — Z00.8 ENCOUNTER FOR OTHER GENERAL EXAMINATION: ICD-10-CM

## 2022-05-17 PROCEDURE — 73718 MRI LOWER EXTREMITY W/O DYE: CPT | Mod: 26,RT

## 2022-05-17 PROCEDURE — 73718 MRI LOWER EXTREMITY W/O DYE: CPT

## 2022-06-06 ENCOUNTER — OUTPATIENT (OUTPATIENT)
Dept: OUTPATIENT SERVICES | Facility: HOSPITAL | Age: 72
LOS: 1 days | Discharge: HOME | End: 2022-06-06

## 2022-06-06 DIAGNOSIS — C50.919 MALIGNANT NEOPLASM OF UNSPECIFIED SITE OF UNSPECIFIED FEMALE BREAST: Chronic | ICD-10-CM

## 2022-06-06 DIAGNOSIS — Z98.89 OTHER SPECIFIED POSTPROCEDURAL STATES: Chronic | ICD-10-CM

## 2022-06-06 DIAGNOSIS — Z90.49 ACQUIRED ABSENCE OF OTHER SPECIFIED PARTS OF DIGESTIVE TRACT: Chronic | ICD-10-CM

## 2022-06-06 DIAGNOSIS — Z98.890 OTHER SPECIFIED POSTPROCEDURAL STATES: Chronic | ICD-10-CM

## 2022-06-06 DIAGNOSIS — Z90.3 ACQUIRED ABSENCE OF STOMACH [PART OF]: Chronic | ICD-10-CM

## 2022-06-06 DIAGNOSIS — C50.919 MALIGNANT NEOPLASM OF UNSPECIFIED SITE OF UNSPECIFIED FEMALE BREAST: ICD-10-CM

## 2022-06-13 ENCOUNTER — APPOINTMENT (OUTPATIENT)
Dept: HEMATOLOGY ONCOLOGY | Facility: CLINIC | Age: 72
End: 2022-06-13
Payer: COMMERCIAL

## 2022-06-13 ENCOUNTER — RESULT REVIEW (OUTPATIENT)
Age: 72
End: 2022-06-13

## 2022-06-13 VITALS
DIASTOLIC BLOOD PRESSURE: 79 MMHG | OXYGEN SATURATION: 99 % | SYSTOLIC BLOOD PRESSURE: 117 MMHG | RESPIRATION RATE: 17 BRPM | TEMPERATURE: 97.4 F | HEIGHT: 61.97 IN | BODY MASS INDEX: 38.54 KG/M2 | HEART RATE: 68 BPM | WEIGHT: 209.44 LBS

## 2022-06-13 LAB
BASOPHILS # BLD AUTO: 0.06 K/UL — SIGNIFICANT CHANGE UP (ref 0–0.2)
BASOPHILS NFR BLD AUTO: 1 % — SIGNIFICANT CHANGE UP (ref 0–2)
EOSINOPHIL # BLD AUTO: 0.12 K/UL — SIGNIFICANT CHANGE UP (ref 0–0.5)
EOSINOPHIL NFR BLD AUTO: 2 % — SIGNIFICANT CHANGE UP (ref 0–6)
HCT VFR BLD CALC: 37.4 % — SIGNIFICANT CHANGE UP (ref 34.5–45)
HGB BLD-MCNC: 12.1 G/DL — SIGNIFICANT CHANGE UP (ref 11.5–15.5)
IMM GRANULOCYTES NFR BLD AUTO: 0.3 % — SIGNIFICANT CHANGE UP (ref 0–1.5)
LYMPHOCYTES # BLD AUTO: 1.03 K/UL — SIGNIFICANT CHANGE UP (ref 1–3.3)
LYMPHOCYTES # BLD AUTO: 17.3 % — SIGNIFICANT CHANGE UP (ref 13–44)
MCHC RBC-ENTMCNC: 30.8 PG — SIGNIFICANT CHANGE UP (ref 27–34)
MCHC RBC-ENTMCNC: 32.4 G/DL — SIGNIFICANT CHANGE UP (ref 32–36)
MCV RBC AUTO: 95.2 FL — SIGNIFICANT CHANGE UP (ref 80–100)
MONOCYTES # BLD AUTO: 0.58 K/UL — SIGNIFICANT CHANGE UP (ref 0–0.9)
MONOCYTES NFR BLD AUTO: 9.7 % — SIGNIFICANT CHANGE UP (ref 2–14)
NEUTROPHILS # BLD AUTO: 4.14 K/UL — SIGNIFICANT CHANGE UP (ref 1.8–7.4)
NEUTROPHILS NFR BLD AUTO: 69.7 % — SIGNIFICANT CHANGE UP (ref 43–77)
NRBC # BLD: 0 /100 WBCS — SIGNIFICANT CHANGE UP (ref 0–0)
PLATELET # BLD AUTO: 306 K/UL — SIGNIFICANT CHANGE UP (ref 150–400)
RBC # BLD: 3.93 M/UL — SIGNIFICANT CHANGE UP (ref 3.8–5.2)
RBC # FLD: 12.8 % — SIGNIFICANT CHANGE UP (ref 10.3–14.5)
WBC # BLD: 5.95 K/UL — SIGNIFICANT CHANGE UP (ref 3.8–10.5)
WBC # FLD AUTO: 5.95 K/UL — SIGNIFICANT CHANGE UP (ref 3.8–10.5)

## 2022-06-13 PROCEDURE — 99214 OFFICE O/P EST MOD 30 MIN: CPT

## 2022-06-13 NOTE — HISTORY OF PRESENT ILLNESS
[Disease: _____________________] : Disease: [unfilled] [AJCC Stage: ____] : AJCC Stage: [unfilled] [de-identified] : Age 67: left breast cancer \par She had noticed left breast pain: burning sensation and felt lump. She had left breast 11:00 biopsy in 4/2018 which showed high grade malignant neoplasm that was JYOTSNA 3 positive, ER negative, KS 3%, and Ita4quq negative Ki 67 of 70%. She had MRI of the breast that showed satellite lesion in the left upper outer quadrant which showed DCIS cribiform pattern with low grade nuclear atypia that was ER >98% and KS 80%. Since her initial mammogram and MRI, she feels the breast mass is growing larger and more tender. She saw Dr Rossi who has referred her for neoadjuvant chemotherapy prior to breast conservation surgery. She had [facility] genetic testing that did not show any actionable mutations. She consented to dose dense ACT neoadjuvant chemotherapy and started on 7/12/18. She tolerated AC well but developed presyncopal event and neutropenia Grade 3 after 1st cycle of paclitaxel. She completed neoadjuvant chemotherapy 10/25/18. On 11/28/18, she underwent left lumpectomy with sentinel lymph node biopsy with Dr Rossi. The pathology showed no residual invasive carcinoma, biopsy changes, and negative 7 lymph nodes. Had mild elevation in CA 27-29 and had CT and bone scan done 12/2021 that showed no disease.  [de-identified] : ER negative, ND 3%, Hgd1qwp negative  [de-identified] : Genetic testing: [facility]: no actionable mutations \par dose dense AC 7/12/18 to 8/23/18 \par dose dense paclitaxel: 9/6/18 -10/25\par 11/28/18- Dr. Rossi lumpectomy and sentinel lymph node biopsy \par RT Dr Patel: 2/2019 [de-identified] : Since last visit, she saw endocrinologist for hypothyroid. She denies any new cough or HA or back pain. She saw orthopedics for the R thigh pain and had MRI of the tibia which was negative. She will be getting exercise to see if thigh pain improves. No new medications. Her niece recently has been diagnosed with breast cancer.

## 2022-06-13 NOTE — PHYSICAL EXAM
[Fully active, able to carry on all pre-disease performance without restriction] : Status 0 - Fully active, able to carry on all pre-disease performance without restriction [Normal] : affect appropriate [de-identified] : lumpectomy and sentinel lymph node site with left surgical defect over the upper outer left breast; no palpable lymph nodes or new nodules  [de-identified] : no pain with abduction/ adduction of the R hip

## 2022-06-13 NOTE — ASSESSMENT
[FreeTextEntry1] : 72 y/o  BRCA negative F with left clinical Stage II to III breast cancer that is ER negative, MN 3%, and Scl8rmt negative. She completed AC x 4 Taxol X 4 10/25/18. She had pathologic CR with her triple negative breast cancer. She completed RT to the breast and did not tolerate trial of anastrozole for ER/ MN positive for DCIS. She remains on surveillance. Will repeat interval tumor markers. We reviewed signs and symptoms of breast cancer recurrence. No new signs or symptoms of recurrence. Gave her copy of genetics report and will see if her niece has any findings on her genetics testing. Questions answered to her satisfaction. She is agreeable with plan. Next follow up in 6 months but earlier if any new symptoms.\par

## 2022-06-13 NOTE — REVIEW OF SYSTEMS
[Joint Pain] : no joint pain [Joint Stiffness] : no joint stiffness [Muscle Pain] : muscle pain [Muscle Weakness] : no muscle weakness [Negative] : Allergic/Immunologic [FreeTextEntry9] : R thigh pain

## 2022-06-14 LAB
ALBUMIN SERPL ELPH-MCNC: 4.4 G/DL
ALP BLD-CCNC: 82 U/L
ALT SERPL-CCNC: 10 U/L
ANION GAP SERPL CALC-SCNC: 13 MMOL/L
AST SERPL-CCNC: 16 U/L
BILIRUB SERPL-MCNC: 0.3 MG/DL
BUN SERPL-MCNC: 17 MG/DL
CALCIUM SERPL-MCNC: 9.3 MG/DL
CANCER AG27-29 SERPL-ACNC: 31.4 U/ML
CEA SERPL-MCNC: 1.9 NG/ML
CHLORIDE SERPL-SCNC: 107 MMOL/L
CO2 SERPL-SCNC: 24 MMOL/L
CREAT SERPL-MCNC: 0.91 MG/DL
EGFR: 68 ML/MIN/1.73M2
GLUCOSE SERPL-MCNC: 94 MG/DL
POTASSIUM SERPL-SCNC: 4.9 MMOL/L
PROT SERPL-MCNC: 6.5 G/DL
SODIUM SERPL-SCNC: 145 MMOL/L

## 2022-07-13 ENCOUNTER — APPOINTMENT (OUTPATIENT)
Dept: ENDOCRINOLOGY | Facility: CLINIC | Age: 72
End: 2022-07-13

## 2022-07-13 VITALS
HEIGHT: 61 IN | DIASTOLIC BLOOD PRESSURE: 84 MMHG | HEART RATE: 70 BPM | OXYGEN SATURATION: 94 % | SYSTOLIC BLOOD PRESSURE: 128 MMHG

## 2022-07-13 PROCEDURE — 99213 OFFICE O/P EST LOW 20 MIN: CPT | Mod: 25

## 2022-07-13 PROCEDURE — 36415 COLL VENOUS BLD VENIPUNCTURE: CPT

## 2022-07-14 ENCOUNTER — NON-APPOINTMENT (OUTPATIENT)
Age: 72
End: 2022-07-14

## 2022-07-14 LAB
T4 FREE SERPL-MCNC: 1.8 NG/DL
TSH SERPL-ACNC: 0.57 UIU/ML

## 2022-07-14 NOTE — ASSESSMENT
[Levothyroxine] : The patient was instructed to take Levothyroxine on an empty stomach, separate from vitamins, and wait at least 30 minutes before eating [FreeTextEntry1] : Patient presents for management of abnormal thyroid function. \par H/o recent COVID infection 2 weeks ago, however noted hypothyroid TFTs since March 2022, not likely thyroiditis.\par Has h/o breast cancer, but denies h/o recent chemotherapy or immunotherapy, admitted to RT in 2019.\par Reviewed TFTs from May 2022, TSH was normal at 2.5, much improved since previous TSH levels of 41 and 153.\par Currently on levothyroxine 100mcg daily \par Bloodwork was collected in office todaily for repeat TFTs,\par \par Addendum: reviewed bloodwork from 7/13/22: noted TSH and free t4 normal, pt advised to continue levothyroxine 100mcg daily.\par \par Answered all questions today; patient verbalized understanding of the above\par RTC in 6-8 weeks.\par \par

## 2022-07-14 NOTE — HISTORY OF PRESENT ILLNESS
[FreeTextEntry1] : ROX SOMMER  is a 72 yo female with past medical history of obesity s/p gastric sleeve in 2017, breast cancer, hypothyroidism, GERD, and asthma who presents for management of hypothyroidism.\par \par Patient as recently diagnosed by PCP as she was found to have hypothyroidism on routine bloodwork. She denies any known prior thyroid diseas She is now on levothyroxine 100mcg daily.\par \par She has history of breast cancer, she notes she is not on chemotherapy or immunotherapy, s/p RT in Feb 2019, she notes she had 1 month of RT sessions.Patient feels cold all the time, notes blurry vision, she saw an ophthalmologist but she is unsure if she had a clear diagnosis. She denies dyspnea, dysphagia, dysphonia, denies changes in bowel habits including diarrhea or constipation.  She gained 15lbs since Jan 2022.  She notes recent COVID infection 2 weeks ago, she had mild to little symptoms and has since recovered.

## 2022-07-14 NOTE — PHYSICAL EXAM
[Alert] : alert [Obese] : obese [No Acute Distress] : no acute distress [Well Developed] : well developed [Normal Sclera/Conjunctiva] : normal sclera/conjunctiva [EOMI] : extra ocular movement intact [No Proptosis] : no proptosis [No Lid Lag] : no lid lag [Normal Hearing] : hearing was normal [No LAD] : no lymphadenopathy [Supple] : the neck was supple [Thyroid Not Enlarged] : the thyroid was not enlarged [No Thyroid Nodules] : no palpable thyroid nodules [No Respiratory Distress] : no respiratory distress [No Accessory Muscle Use] : no accessory muscle use [Normal Rate and Effort] : normal respiratory rate and effort [Clear to Auscultation] : lungs were clear to auscultation bilaterally [Normal S1, S2] : normal S1 and S2 [No Murmurs] : no murmurs [Normal Rate] : heart rate was normal [Regular Rhythm] : with a regular rhythm [Normal Supraclavicular Nodes] : no supraclavicular lymphadenopathy [Normal Anterior Cervical Nodes] : no anterior cervical lymphadenopathy [No Stigmata of Cushings Syndrome] : no stigmata of Cushings Syndrome [Normal Gait] : normal gait [No Clubbing, Cyanosis] : no clubbing  or cyanosis of the fingernails [No Involuntary Movements] : no involuntary movements were seen [No Rash] : no rash [Normal Reflexes] : deep tendon reflexes were 2+ and symmetric [No Tremors] : no tremors [Oriented x3] : oriented to person, place, and time [Normal Insight/Judgement] : insight and judgment were intact [Abdominal Striae] : no abdominal striae [Acne] : no acne [Hirsutism] : no hirsutism

## 2022-07-18 ENCOUNTER — NON-APPOINTMENT (OUTPATIENT)
Age: 72
End: 2022-07-18

## 2022-09-23 ENCOUNTER — APPOINTMENT (OUTPATIENT)
Dept: ULTRASOUND IMAGING | Facility: IMAGING CENTER | Age: 72
End: 2022-09-23

## 2022-09-23 ENCOUNTER — APPOINTMENT (OUTPATIENT)
Dept: MAMMOGRAPHY | Facility: IMAGING CENTER | Age: 72
End: 2022-09-23

## 2022-09-23 ENCOUNTER — OUTPATIENT (OUTPATIENT)
Dept: OUTPATIENT SERVICES | Facility: HOSPITAL | Age: 72
LOS: 1 days | End: 2022-09-23
Payer: COMMERCIAL

## 2022-09-23 DIAGNOSIS — Z98.890 OTHER SPECIFIED POSTPROCEDURAL STATES: Chronic | ICD-10-CM

## 2022-09-23 DIAGNOSIS — Z90.3 ACQUIRED ABSENCE OF STOMACH [PART OF]: Chronic | ICD-10-CM

## 2022-09-23 DIAGNOSIS — Z98.89 OTHER SPECIFIED POSTPROCEDURAL STATES: Chronic | ICD-10-CM

## 2022-09-23 DIAGNOSIS — C50.919 MALIGNANT NEOPLASM OF UNSPECIFIED SITE OF UNSPECIFIED FEMALE BREAST: Chronic | ICD-10-CM

## 2022-09-23 DIAGNOSIS — Z00.8 ENCOUNTER FOR OTHER GENERAL EXAMINATION: ICD-10-CM

## 2022-09-23 DIAGNOSIS — Z90.49 ACQUIRED ABSENCE OF OTHER SPECIFIED PARTS OF DIGESTIVE TRACT: Chronic | ICD-10-CM

## 2022-09-23 PROCEDURE — G0279: CPT

## 2022-09-23 PROCEDURE — 76641 ULTRASOUND BREAST COMPLETE: CPT | Mod: 26,50

## 2022-09-23 PROCEDURE — 77066 DX MAMMO INCL CAD BI: CPT | Mod: 26

## 2022-09-23 PROCEDURE — G0279: CPT | Mod: 26

## 2022-09-23 PROCEDURE — 76641 ULTRASOUND BREAST COMPLETE: CPT

## 2022-09-23 PROCEDURE — 77066 DX MAMMO INCL CAD BI: CPT

## 2022-10-19 ENCOUNTER — APPOINTMENT (OUTPATIENT)
Dept: ENDOCRINOLOGY | Facility: CLINIC | Age: 72
End: 2022-10-19

## 2022-10-19 VITALS
OXYGEN SATURATION: 94 % | DIASTOLIC BLOOD PRESSURE: 76 MMHG | HEART RATE: 75 BPM | BODY MASS INDEX: 39.84 KG/M2 | WEIGHT: 211 LBS | SYSTOLIC BLOOD PRESSURE: 129 MMHG | HEIGHT: 61 IN

## 2022-10-19 PROCEDURE — 99212 OFFICE O/P EST SF 10 MIN: CPT

## 2022-10-20 NOTE — DATA REVIEWED
[FreeTextEntry1] : Had extensive spoke labs from 8/19/2022, noted TSH mildly low at 0.25, free T4 1.5 (normal), total T4 9.0 (normal) \par \par repeat labs from outside facility on 9/13/2022 noted improvement in TSH to 2.1\par \par

## 2022-10-20 NOTE — HISTORY OF PRESENT ILLNESS
[FreeTextEntry1] : ROX SOMMER  is a 71 yo female with past medical history of obesity s/p gastric sleeve in 2017, breast cancer, hypothyroidism, GERD, and asthma who presents for  hypothyroidism follow up\par \par Patient was diagnosed with hypothyroidism on routine bloodwork in April/May 2022.  She was on levothyroxine 100mcg daily at last endocrine visit. She notes while on 100mcg dose, patient "felt itchy and headaches", she notes her doctor decreased medication in Aug to 75mcg daily. She notes she now feels better. Denies any COVID infection. \par She has history of breast cancer, she notes she is not on chemotherapy or immunotherapy, s/p RT in Feb 2019, she notes she had 1 month of RT sessions.Patient feels cold all the time, notes blurry vision, she saw an ophthalmologist but she is unsure if she had a clear diagnosis. She denies dyspnea, dysphagia, dysphonia, denies changes in bowel habits including diarrhea or constipation.

## 2022-10-20 NOTE — ASSESSMENT
[Levothyroxine] : The patient was instructed to take Levothyroxine on an empty stomach, separate from vitamins, and wait at least 30 minutes before eating [FreeTextEntry1] : History of hypothyroidism \par Has h/o breast cancer, but denies h/o recent chemotherapy or immunotherapy, admitted to RT in 2019.\par Reviewed outside labs which noted recent decrease in levothyroxine dose f was made in August due to mildly low TSH of 0.25.  Patient is currently taking levothyroxine 75 mcg daily, last TSH from September 2022 noted to be normal at 2.1.  \par Continue levothyroxine 75mcg daily \par Patient is clinically euthyroid today\par \par Answered all questions today; patient verbalized understanding of the above\par RTC in 3-6 weeks.\par \par

## 2022-10-24 ENCOUNTER — FORM ENCOUNTER (OUTPATIENT)
Age: 72
End: 2022-10-24

## 2022-10-24 ENCOUNTER — APPOINTMENT (OUTPATIENT)
Dept: ORTHOPEDIC SURGERY | Facility: CLINIC | Age: 72
End: 2022-10-24
Payer: COMMERCIAL

## 2022-10-24 VITALS — BODY MASS INDEX: 38.64 KG/M2 | WEIGHT: 210 LBS | HEIGHT: 62 IN

## 2022-10-24 DIAGNOSIS — M70.61 TROCHANTERIC BURSITIS, RIGHT HIP: ICD-10-CM

## 2022-10-24 PROCEDURE — 72110 X-RAY EXAM L-2 SPINE 4/>VWS: CPT

## 2022-10-24 PROCEDURE — 99204 OFFICE O/P NEW MOD 45 MIN: CPT | Mod: 25

## 2022-10-24 PROCEDURE — 20551 NJX 1 TENDON ORIGIN/INSJ: CPT

## 2022-10-24 PROCEDURE — J3490M: CUSTOM

## 2022-10-24 PROCEDURE — 72170 X-RAY EXAM OF PELVIS: CPT

## 2022-10-24 NOTE — HISTORY OF PRESENT ILLNESS
[Gradual] : gradual [7] : 7 [0] : 0 [Burning] : burning [Sharp] : sharp [Frequent] : frequent [Household chores] : household chores [Leisure] : leisure [Rest] : rest [Sitting] : sitting [Standing] : standing [de-identified] : 10/24/22: 73 y/o female with pain in back and right thigh for over a year. Pain in back and thigh - left leg is okay\par \par No PT/chiro/accupucntrue\par No surgery to the area\par No pain management \par \par PMHx Hypothyroidism, Breast CA with lumpectomy 4 years ago.No mets \par Gastric Sleeve , No NSAIDs\par \par retired \par \par xrays today:\par l spine - lumbar degenerative scoliosis,spondylosis\par AP PELVIS - NEGATIVE\par \par  [] : no [FreeTextEntry1] : Right leg [FreeTextEntry5] : ROX SOMMER is a 72 year old F here for an evaluation of right leg pain. Pt believes that her pain in her leg may be connected to her back. She says the pain started about a year ago, with no known injury that caused it. She states that the pain has gotten worse over time. Pain is just in the right thigh area. [FreeTextEntry9] : Massage [de-identified] : Getting up in the morning

## 2022-10-24 NOTE — DISCUSSION/SUMMARY
[de-identified] : appears to have both lumbar stenosis and what looks like right hip bursitis \par indicated for MRi L spine \par injection today tolerated well \par MDP/PT \par fu to review the MRi

## 2022-10-25 ENCOUNTER — APPOINTMENT (OUTPATIENT)
Dept: MRI IMAGING | Facility: CLINIC | Age: 72
End: 2022-10-25

## 2022-10-25 PROCEDURE — 72148 MRI LUMBAR SPINE W/O DYE: CPT

## 2022-11-18 ENCOUNTER — APPOINTMENT (OUTPATIENT)
Dept: ORTHOPEDIC SURGERY | Facility: CLINIC | Age: 72
End: 2022-11-18

## 2022-11-18 DIAGNOSIS — M47.816 SPONDYLOSIS W/OUT MYELOPATHY OR RADICULOPATHY, LUMBAR REGION: ICD-10-CM

## 2022-11-18 DIAGNOSIS — M48.061 SPINAL STENOSIS, LUMBAR REGION WITHOUT NEUROGENIC CLAUDICATION: ICD-10-CM

## 2022-11-18 DIAGNOSIS — M54.16 RADICULOPATHY, LUMBAR REGION: ICD-10-CM

## 2022-11-18 DIAGNOSIS — M48.062 SPINAL STENOSIS, LUMBAR REGION WITH NEUROGENIC CLAUDICATION: ICD-10-CM

## 2022-11-18 DIAGNOSIS — M51.26 OTHER INTERVERTEBRAL DISC DISPLACEMENT, LUMBAR REGION: ICD-10-CM

## 2022-11-18 PROCEDURE — 99214 OFFICE O/P EST MOD 30 MIN: CPT

## 2022-11-18 NOTE — PHYSICAL EXAM
[Right] : right hip [] : negative straight leg raise [FreeTextEntry3] : VERY TENDER over the greater troch

## 2022-11-18 NOTE — DISCUSSION/SUMMARY
[de-identified] : MRI reviewed with patient - stenosis/scoliosis \par cont with chair yoga \par if the bursitis returns she can f/u for another injection

## 2022-11-18 NOTE — HISTORY OF PRESENT ILLNESS
[Gradual] : gradual [7] : 7 [0] : 0 [Burning] : burning [Sharp] : sharp [Frequent] : frequent [Household chores] : household chores [Leisure] : leisure [Rest] : rest [Sitting] : sitting [Standing] : standing [de-identified] : 10/24/22: 73 y/o female with pain in back and right thigh for over a year. Pain in back and thigh - left leg is okay\par \par No PT/chiro/accupucntrue\par No surgery to the area\par No pain management \par \par PMHx Hypothyroidism, Breast CA with lumpectomy 4 years ago.No mets \par Gastric Sleeve , No NSAIDs\par \par retired \par \par xrays today:\par l spine - lumbar degenerative scoliosis,spondylosis\par AP PELVIS - NEGATIVE\par \par 11/18/22: Here to review MRI - plan at last was "appears to have both lumbar stenosis and what looks like right hip bursitis \par indicated for MRi L spine - injection today tolerated well - MDP/PT - fu to review the MRi " - overall the pain remains in the same capacity in the back - the hip is doing better with the injection - no PT so far \par \par chair yoga helps \par \par MRI L spine:\par Multilevel lumbar degenerative disc disease and facet osteoarthrosis with a secondary degenerative levoscoliosis.\par Mild central stenosis at L3-4.\par Moderate central stenosis at L4-5.\par Disc herniations at L1-2, L2-3 and L5-S1 without stenosis or nerve root compression. [] : no [FreeTextEntry1] : Right leg [FreeTextEntry5] : ROX SOMMER is a 72 year old F here for an evaluation of right leg pain. Pt believes that her pain in her leg may be connected to her back. She says the pain started about a year ago, with no known injury that caused it. She states that the pain has gotten worse over time. Pain is just in the right thigh area. [FreeTextEntry9] : Massage [de-identified] : Getting up in the morning

## 2022-12-06 ENCOUNTER — NON-APPOINTMENT (OUTPATIENT)
Age: 72
End: 2022-12-06

## 2022-12-06 LAB
T4 FREE SERPL-MCNC: 1.2 NG/DL
TSH SERPL-ACNC: 10.2 UIU/ML

## 2022-12-08 ENCOUNTER — OUTPATIENT (OUTPATIENT)
Dept: OUTPATIENT SERVICES | Facility: HOSPITAL | Age: 72
LOS: 1 days | Discharge: HOME | End: 2022-12-08

## 2022-12-08 DIAGNOSIS — Z90.49 ACQUIRED ABSENCE OF OTHER SPECIFIED PARTS OF DIGESTIVE TRACT: Chronic | ICD-10-CM

## 2022-12-08 DIAGNOSIS — Z98.89 OTHER SPECIFIED POSTPROCEDURAL STATES: Chronic | ICD-10-CM

## 2022-12-08 DIAGNOSIS — Z98.890 OTHER SPECIFIED POSTPROCEDURAL STATES: Chronic | ICD-10-CM

## 2022-12-08 DIAGNOSIS — C50.919 MALIGNANT NEOPLASM OF UNSPECIFIED SITE OF UNSPECIFIED FEMALE BREAST: Chronic | ICD-10-CM

## 2022-12-08 DIAGNOSIS — Z90.3 ACQUIRED ABSENCE OF STOMACH [PART OF]: Chronic | ICD-10-CM

## 2022-12-08 DIAGNOSIS — C50.919 MALIGNANT NEOPLASM OF UNSPECIFIED SITE OF UNSPECIFIED FEMALE BREAST: ICD-10-CM

## 2022-12-14 ENCOUNTER — RESULT REVIEW (OUTPATIENT)
Age: 72
End: 2022-12-14

## 2022-12-14 ENCOUNTER — APPOINTMENT (OUTPATIENT)
Dept: HEMATOLOGY ONCOLOGY | Facility: CLINIC | Age: 72
End: 2022-12-14

## 2022-12-14 VITALS
SYSTOLIC BLOOD PRESSURE: 159 MMHG | RESPIRATION RATE: 18 BRPM | HEART RATE: 70 BPM | DIASTOLIC BLOOD PRESSURE: 89 MMHG | OXYGEN SATURATION: 98 % | TEMPERATURE: 97 F | BODY MASS INDEX: 39.11 KG/M2 | WEIGHT: 213.85 LBS

## 2022-12-14 LAB
BASOPHILS # BLD AUTO: 0.06 K/UL — SIGNIFICANT CHANGE UP (ref 0–0.2)
BASOPHILS NFR BLD AUTO: 0.9 % — SIGNIFICANT CHANGE UP (ref 0–2)
EOSINOPHIL # BLD AUTO: 0.19 K/UL — SIGNIFICANT CHANGE UP (ref 0–0.5)
EOSINOPHIL NFR BLD AUTO: 2.7 % — SIGNIFICANT CHANGE UP (ref 0–6)
HCT VFR BLD CALC: 37 % — SIGNIFICANT CHANGE UP (ref 34.5–45)
HGB BLD-MCNC: 11.9 G/DL — SIGNIFICANT CHANGE UP (ref 11.5–15.5)
IMM GRANULOCYTES NFR BLD AUTO: 0.3 % — SIGNIFICANT CHANGE UP (ref 0–0.9)
LYMPHOCYTES # BLD AUTO: 0.99 K/UL — LOW (ref 1–3.3)
LYMPHOCYTES # BLD AUTO: 14.2 % — SIGNIFICANT CHANGE UP (ref 13–44)
MCHC RBC-ENTMCNC: 29.8 PG — SIGNIFICANT CHANGE UP (ref 27–34)
MCHC RBC-ENTMCNC: 32.2 G/DL — SIGNIFICANT CHANGE UP (ref 32–36)
MCV RBC AUTO: 92.5 FL — SIGNIFICANT CHANGE UP (ref 80–100)
MONOCYTES # BLD AUTO: 0.6 K/UL — SIGNIFICANT CHANGE UP (ref 0–0.9)
MONOCYTES NFR BLD AUTO: 8.6 % — SIGNIFICANT CHANGE UP (ref 2–14)
NEUTROPHILS # BLD AUTO: 5.1 K/UL — SIGNIFICANT CHANGE UP (ref 1.8–7.4)
NEUTROPHILS NFR BLD AUTO: 73.3 % — SIGNIFICANT CHANGE UP (ref 43–77)
NRBC # BLD: 0 /100 WBCS — SIGNIFICANT CHANGE UP (ref 0–0)
PLATELET # BLD AUTO: 271 K/UL — SIGNIFICANT CHANGE UP (ref 150–400)
RBC # BLD: 4 M/UL — SIGNIFICANT CHANGE UP (ref 3.8–5.2)
RBC # FLD: 13.3 % — SIGNIFICANT CHANGE UP (ref 10.3–14.5)
WBC # BLD: 6.96 K/UL — SIGNIFICANT CHANGE UP (ref 3.8–10.5)
WBC # FLD AUTO: 6.96 K/UL — SIGNIFICANT CHANGE UP (ref 3.8–10.5)

## 2022-12-14 PROCEDURE — 99214 OFFICE O/P EST MOD 30 MIN: CPT

## 2022-12-14 NOTE — REVIEW OF SYSTEMS
[Vision Problems] : vision problems [Negative] : Allergic/Immunologic [Eye Pain] : no eye pain [Red Eyes] : eyes not red [Dry Eyes] : no dryness of the eyes [Confused] : no confusion [Dizziness] : no dizziness [Fainting] : no fainting [Difficulty Walking] : no difficulty walking [FreeTextEntry3] : double vision  [de-identified] : HA chronic: migraines

## 2022-12-14 NOTE — ASSESSMENT
[FreeTextEntry1] : 70 y/o  BRCA negative F with left clinical Stage II to III breast cancer that is ER negative, [place] 3%, and Kil7iml negative. She completed AC x 4 Taxol X 4 10/25/18. She had pathologic CR with her triple negative breast cancer. She completed RT to the breast and did not tolerate trial of anastrozole for ER/ DE positive for DCIS. She remains on surveillance. We reviewed double vision: she will follow up with her eye doctor and call us. If any worsening symptoms, will consider MRI of the brain. We will obtain interval tumor markers along with CMP. Questions answered to her satisfaction. She is agreeable with plan. Next follow up in 6 months but earlier if any new symptoms.\par \par

## 2022-12-14 NOTE — PHYSICAL EXAM
[Fully active, able to carry on all pre-disease performance without restriction] : Status 0 - Fully active, able to carry on all pre-disease performance without restriction [Normal] : affect appropriate [de-identified] : lumpectomy and sentinel lymph node site with left surgical defect over the upper outer left breast; no palpable lymph nodes or new nodules  [de-identified] : no pain with abduction/ adduction of the R hip

## 2022-12-14 NOTE — HISTORY OF PRESENT ILLNESS
[Disease: _____________________] : Disease: [unfilled] [AJCC Stage: ____] : AJCC Stage: [unfilled] [de-identified] : Age 67: left breast cancer \par She had noticed left breast pain: burning sensation and felt lump. She had left breast 11:00 biopsy in 4/2018 which showed high grade malignant neoplasm that was JYOTSNA 3 positive, ER negative, LA 3%, and Kyt0laz negative Ki 67 of 70%. She had MRI of the breast that showed satellite lesion in the left upper outer quadrant which showed DCIS cribiform pattern with low grade nuclear atypia that was ER >98% and LA 80%. Since her initial mammogram and MRI, she feels the breast mass is growing larger and more tender. She saw Dr Rossi who has referred her for neoadjuvant chemotherapy prior to breast conservation surgery. She had [employer] genetic testing that did not show any actionable mutations. She consented to dose dense ACT neoadjuvant chemotherapy and started on 7/12/18. She tolerated AC well but developed presyncopal event and neutropenia Grade 3 after 1st cycle of paclitaxel. She completed neoadjuvant chemotherapy 10/25/18. On 11/28/18, she underwent left lumpectomy with sentinel lymph node biopsy with Dr Rossi. The pathology showed no residual invasive carcinoma, biopsy changes, and negative 7 lymph nodes. Had mild elevation in CA 27-29 and had CT and bone scan done 12/2021 that showed no disease.  [de-identified] : ER negative, W[place] 3%, Bnt8mvj negative  [de-identified] : Genetic testing: Myriad: no actionable mutations \par dose dense AC 7/12/18 to 8/23/18 \par dose dense paclitaxel: 9/6/18 -10/25\par 11/28/18- Dr. Rossi lumpectomy and sentinel lymph node biopsy \par RT Dr Patel: 2/2019 [de-identified] : On thyroid 88 mcg daily currently. She has noticed decreased vision: went to eye doctor. Two weeks ago: noticed double vision. Saw 2 moons when looking up at the rubio. Has chronic HA: migraine sufferer: sometimes lasts one day and sometimes lasts few hours. Denies any new breast pain or chest wall changes. No back pain, cough or HA. Has chronic L breast pain: saw Dr Rossi and had aspiration of fluid which was benign.

## 2022-12-16 LAB
ALBUMIN SERPL ELPH-MCNC: 4.1 G/DL
ALP BLD-CCNC: 84 U/L
ALT SERPL-CCNC: 11 U/L
ANION GAP SERPL CALC-SCNC: 11 MMOL/L
AST SERPL-CCNC: 16 U/L
BILIRUB SERPL-MCNC: 0.4 MG/DL
BUN SERPL-MCNC: 14 MG/DL
CALCIUM SERPL-MCNC: 9.5 MG/DL
CANCER AG27-29 SERPL-ACNC: 28.2 U/ML
CEA SERPL-MCNC: 1.9 NG/ML
CHLORIDE SERPL-SCNC: 105 MMOL/L
CO2 SERPL-SCNC: 27 MMOL/L
CREAT SERPL-MCNC: 0.97 MG/DL
EGFR: 62 ML/MIN/1.73M2
GLUCOSE SERPL-MCNC: 87 MG/DL
POTASSIUM SERPL-SCNC: 4.5 MMOL/L
PROT SERPL-MCNC: 6.5 G/DL
SODIUM SERPL-SCNC: 142 MMOL/L

## 2023-03-21 ENCOUNTER — OUTPATIENT (OUTPATIENT)
Dept: OUTPATIENT SERVICES | Facility: HOSPITAL | Age: 73
LOS: 1 days | End: 2023-03-21
Payer: COMMERCIAL

## 2023-03-21 ENCOUNTER — APPOINTMENT (OUTPATIENT)
Dept: MAMMOGRAPHY | Facility: IMAGING CENTER | Age: 73
End: 2023-03-21
Payer: COMMERCIAL

## 2023-03-21 DIAGNOSIS — Z98.89 OTHER SPECIFIED POSTPROCEDURAL STATES: Chronic | ICD-10-CM

## 2023-03-21 DIAGNOSIS — Z98.890 OTHER SPECIFIED POSTPROCEDURAL STATES: Chronic | ICD-10-CM

## 2023-03-21 DIAGNOSIS — C50.919 MALIGNANT NEOPLASM OF UNSPECIFIED SITE OF UNSPECIFIED FEMALE BREAST: Chronic | ICD-10-CM

## 2023-03-21 DIAGNOSIS — Z00.8 ENCOUNTER FOR OTHER GENERAL EXAMINATION: ICD-10-CM

## 2023-03-21 DIAGNOSIS — Z90.49 ACQUIRED ABSENCE OF OTHER SPECIFIED PARTS OF DIGESTIVE TRACT: Chronic | ICD-10-CM

## 2023-03-21 DIAGNOSIS — Z90.3 ACQUIRED ABSENCE OF STOMACH [PART OF]: Chronic | ICD-10-CM

## 2023-03-21 PROCEDURE — 77065 DX MAMMO INCL CAD UNI: CPT | Mod: 26,LT

## 2023-03-21 PROCEDURE — 77065 DX MAMMO INCL CAD UNI: CPT

## 2023-04-19 ENCOUNTER — APPOINTMENT (OUTPATIENT)
Dept: ENDOCRINOLOGY | Facility: CLINIC | Age: 73
End: 2023-04-19
Payer: COMMERCIAL

## 2023-04-19 VITALS
WEIGHT: 217 LBS | BODY MASS INDEX: 39.93 KG/M2 | HEART RATE: 84 BPM | HEIGHT: 62 IN | SYSTOLIC BLOOD PRESSURE: 128 MMHG | OXYGEN SATURATION: 94 % | DIASTOLIC BLOOD PRESSURE: 80 MMHG

## 2023-04-19 PROCEDURE — 36415 COLL VENOUS BLD VENIPUNCTURE: CPT

## 2023-04-19 PROCEDURE — 99213 OFFICE O/P EST LOW 20 MIN: CPT | Mod: 25

## 2023-04-21 LAB
T4 FREE SERPL-MCNC: 1.5 NG/DL
TSH SERPL-ACNC: 1.33 UIU/ML

## 2023-04-27 NOTE — ASU PATIENT PROFILE, ADULT - PSH
room air H/O hernia repair  Tamiko, Cristal  History of sleeve gastrectomy  2014  S/P appendectomy  1967  S/P   x3 (, , )  S/P laparoscopic cholecystectomy  3/8/18

## 2023-05-01 NOTE — HISTORY OF PRESENT ILLNESS
[FreeTextEntry1] : ROX SOMMER  is a 71 yo female with past medical history of obesity s/p gastric sleeve in 2017, breast cancer, hypothyroidism, GERD, and asthma who presents for hypothyroidism follow up\par \par Patient was diagnosed with hypothyroidism on routine bloodwork in April/May 2022.  She was on levothyroxine 88mcg daily at last endocrine visit in Dec 2022. \par \par She has history of breast cancer, she notes she is not on chemotherapy or immunotherapy, s/p RT in Feb 2019, she notes she had 1 month of RT sessions.Patient feels cold all the time, notes blurry vision, she saw an ophthalmologist but she is unsure if she had a clear diagnosis. She denies dyspnea, dysphagia, dysphonia, denies changes in bowel habits including diarrhea or constipation. Has next f/u visit

## 2023-05-01 NOTE — ASSESSMENT
[Levothyroxine] : The patient was instructed to take Levothyroxine on an empty stomach, separate from vitamins, and wait at least 30 minutes before eating [FreeTextEntry1] : History of hypothyroidism \par Has h/o breast cancer, but denies h/o recent chemotherapy or immunotherapy, admitted to RT in 2019.\par Bloodwork was collected in office today, will f/u results accordingly. \par Continue levothyroxine 88mcg daily \par Patient is clinically euthyroid today\par \par Answered all questions today; patient verbalized understanding of the above\par RTC in 6 months. \par \par

## 2023-06-07 ENCOUNTER — OUTPATIENT (OUTPATIENT)
Dept: OUTPATIENT SERVICES | Facility: HOSPITAL | Age: 73
LOS: 1 days | Discharge: HOME | End: 2023-06-07

## 2023-06-07 DIAGNOSIS — Z98.89 OTHER SPECIFIED POSTPROCEDURAL STATES: Chronic | ICD-10-CM

## 2023-06-07 DIAGNOSIS — Z98.890 OTHER SPECIFIED POSTPROCEDURAL STATES: Chronic | ICD-10-CM

## 2023-06-07 DIAGNOSIS — C50.919 MALIGNANT NEOPLASM OF UNSPECIFIED SITE OF UNSPECIFIED FEMALE BREAST: ICD-10-CM

## 2023-06-07 DIAGNOSIS — C50.919 MALIGNANT NEOPLASM OF UNSPECIFIED SITE OF UNSPECIFIED FEMALE BREAST: Chronic | ICD-10-CM

## 2023-06-07 DIAGNOSIS — Z90.3 ACQUIRED ABSENCE OF STOMACH [PART OF]: Chronic | ICD-10-CM

## 2023-06-07 DIAGNOSIS — Z90.49 ACQUIRED ABSENCE OF OTHER SPECIFIED PARTS OF DIGESTIVE TRACT: Chronic | ICD-10-CM

## 2023-06-19 ENCOUNTER — APPOINTMENT (OUTPATIENT)
Dept: HEMATOLOGY ONCOLOGY | Facility: CLINIC | Age: 73
End: 2023-06-19
Payer: COMMERCIAL

## 2023-06-19 ENCOUNTER — RESULT REVIEW (OUTPATIENT)
Age: 73
End: 2023-06-19

## 2023-06-19 VITALS
SYSTOLIC BLOOD PRESSURE: 118 MMHG | BODY MASS INDEX: 39.72 KG/M2 | WEIGHT: 217.13 LBS | RESPIRATION RATE: 16 BRPM | OXYGEN SATURATION: 99 % | DIASTOLIC BLOOD PRESSURE: 79 MMHG | HEART RATE: 64 BPM

## 2023-06-19 DIAGNOSIS — R53.82 CHRONIC FATIGUE, UNSPECIFIED: ICD-10-CM

## 2023-06-19 LAB
BASOPHILS # BLD AUTO: 0.05 K/UL — SIGNIFICANT CHANGE UP (ref 0–0.2)
BASOPHILS NFR BLD AUTO: 0.8 % — SIGNIFICANT CHANGE UP (ref 0–2)
EOSINOPHIL # BLD AUTO: 0.17 K/UL — SIGNIFICANT CHANGE UP (ref 0–0.5)
EOSINOPHIL NFR BLD AUTO: 2.7 % — SIGNIFICANT CHANGE UP (ref 0–6)
HCT VFR BLD CALC: 37.9 % — SIGNIFICANT CHANGE UP (ref 34.5–45)
HGB BLD-MCNC: 12 G/DL — SIGNIFICANT CHANGE UP (ref 11.5–15.5)
IMM GRANULOCYTES NFR BLD AUTO: 0.5 % — SIGNIFICANT CHANGE UP (ref 0–0.9)
LYMPHOCYTES # BLD AUTO: 1.28 K/UL — SIGNIFICANT CHANGE UP (ref 1–3.3)
LYMPHOCYTES # BLD AUTO: 20.5 % — SIGNIFICANT CHANGE UP (ref 13–44)
MCHC RBC-ENTMCNC: 29.1 PG — SIGNIFICANT CHANGE UP (ref 27–34)
MCHC RBC-ENTMCNC: 31.7 G/DL — LOW (ref 32–36)
MCV RBC AUTO: 92 FL — SIGNIFICANT CHANGE UP (ref 80–100)
MONOCYTES # BLD AUTO: 0.77 K/UL — SIGNIFICANT CHANGE UP (ref 0–0.9)
MONOCYTES NFR BLD AUTO: 12.4 % — SIGNIFICANT CHANGE UP (ref 2–14)
NEUTROPHILS # BLD AUTO: 3.93 K/UL — SIGNIFICANT CHANGE UP (ref 1.8–7.4)
NEUTROPHILS NFR BLD AUTO: 63.1 % — SIGNIFICANT CHANGE UP (ref 43–77)
NRBC # BLD: 0 /100 WBCS — SIGNIFICANT CHANGE UP (ref 0–0)
PLATELET # BLD AUTO: 311 K/UL — SIGNIFICANT CHANGE UP (ref 150–400)
RBC # BLD: 4.12 M/UL — SIGNIFICANT CHANGE UP (ref 3.8–5.2)
RBC # FLD: 13.7 % — SIGNIFICANT CHANGE UP (ref 10.3–14.5)
WBC # BLD: 6.23 K/UL — SIGNIFICANT CHANGE UP (ref 3.8–10.5)
WBC # FLD AUTO: 6.23 K/UL — SIGNIFICANT CHANGE UP (ref 3.8–10.5)

## 2023-06-19 PROCEDURE — 99214 OFFICE O/P EST MOD 30 MIN: CPT

## 2023-06-19 RX ORDER — CHLORHEXIDINE GLUCONATE 4 %
1000 LIQUID (ML) TOPICAL DAILY
Qty: 30 | Refills: 3 | Status: DISCONTINUED | COMMUNITY
Start: 2022-03-18 | End: 2023-06-19

## 2023-06-19 RX ORDER — ALBUTEROL SULFATE 90 UG/1
108 (90 BASE) INHALANT RESPIRATORY (INHALATION)
Qty: 1 | Refills: 2 | Status: DISCONTINUED | COMMUNITY
Start: 2020-03-18 | End: 2023-06-19

## 2023-06-19 RX ORDER — METHYLPREDNISOLONE 4 MG/1
4 TABLET ORAL
Qty: 1 | Refills: 1 | Status: DISCONTINUED | COMMUNITY
Start: 2022-10-24 | End: 2023-06-19

## 2023-06-19 RX ORDER — OMEPRAZOLE 40 MG/1
40 CAPSULE, DELAYED RELEASE ORAL
Qty: 30 | Refills: 1 | Status: DISCONTINUED | COMMUNITY
Start: 2018-08-09 | End: 2023-06-19

## 2023-06-19 RX ORDER — CHOLECALCIFEROL (VITAMIN D3) 1250 MCG
1.25 MG CAPSULE ORAL
Refills: 0 | Status: DISCONTINUED | COMMUNITY
Start: 2020-06-03 | End: 2023-06-19

## 2023-06-19 RX ORDER — B-COMPLEX WITH VITAMIN C
TABLET ORAL
Refills: 0 | Status: DISCONTINUED | COMMUNITY
Start: 2020-06-03 | End: 2023-06-19

## 2023-06-19 RX ORDER — MULTIVIT WITH CALCIUM,IRON,MIN
TABLET ORAL DAILY
Refills: 0 | Status: DISCONTINUED | COMMUNITY
Start: 2019-06-07 | End: 2023-06-19

## 2023-06-19 NOTE — REVIEW OF SYSTEMS
[Diarrhea: Grade 0] : Diarrhea: Grade 0 [Joint Pain] : joint pain [Negative] : Allergic/Immunologic [Joint Stiffness] : no joint stiffness [Muscle Pain] : no muscle pain [Muscle Weakness] : no muscle weakness [FreeTextEntry9] : bursitis over the hip

## 2023-06-19 NOTE — PHYSICAL EXAM
[Fully active, able to carry on all pre-disease performance without restriction] : Status 0 - Fully active, able to carry on all pre-disease performance without restriction [Normal] : affect appropriate [de-identified] : lumpectomy and sentinel lymph node site with left surgical defect over the upper outer left breast; no palpable lymph nodes or new nodules

## 2023-06-19 NOTE — CONSULT LETTER
[Dear  ___] : Dear  [unfilled], [Courtesy Letter:] : I had the pleasure of seeing your patient, [unfilled], in my office today. [Please see my note below.] : Please see my note below. [Consult Closing:] : Thank you very much for allowing me to participate in the care of this patient.  If you have any questions, please do not hesitate to contact me. [Sincerely,] : Sincerely, [FreeTextEntry2] : Sara Rossi MD\par [address] \[place] [address]\par  [FreeTextEntry3] : Arian Hussein MD\par Attending\par [facility]\par

## 2023-06-19 NOTE — HISTORY OF PRESENT ILLNESS
[Disease: _____________________] : Disease: [unfilled] [AJCC Stage: ____] : AJCC Stage: [unfilled] [de-identified] : Age 67: left breast cancer \par She had noticed left breast pain: burning sensation and felt lump. She had left breast 11:00 biopsy in 4/2018 which showed high grade malignant neoplasm that was JYOTSNA 3 positive, ER negative, RI 3%, and Tvn6qgv negative Ki 67 of 70%. She had MRI of the breast that showed satellite lesion in the left upper outer quadrant which showed DCIS cribiform pattern with low grade nuclear atypia that was ER >98% and RI 80%. Since her initial mammogram and MRI, she feels the breast mass is growing larger and more tender. She saw Dr Rossi who has referred her for neoadjuvant chemotherapy prior to breast conservation surgery. She had Myriad genetic testing that did not show any actionable mutations. She consented to dose dense ACT neoadjuvant chemotherapy and started on 7/12/18. She tolerated AC well but developed presyncopal event and neutropenia Grade 3 after 1st cycle of paclitaxel. She completed neoadjuvant chemotherapy 10/25/18. On 11/28/18, she underwent left lumpectomy with sentinel lymph node biopsy with Dr Rossi. The pathology showed no residual invasive carcinoma, biopsy changes, and negative 7 lymph nodes. Had mild elevation in CA 27-29 and had CT and bone scan done 12/2021 that showed no disease.  [de-identified] : ER negative, SD 3%, Yjl5wll negative  [de-identified] : Genetic testing: Myriad: no actionable mutations \par dose dense AC 7/12/18 to 8/23/18 \par dose dense paclitaxel: 9/6/18 -10/25\par 11/28/18- Dr. Rossi lumpectomy and sentinel lymph node biopsy \par RT Dr Patel: 2/2019 [de-identified] : She continues to feel chronic fatigue even after supplementation with thyroid medication. She saw endocrine and had TFT checked and WNL. She denies any OSMAN and able to go up flight of stairs and walk 1 block. She sleeps 5 hours/ night and still feels tired upon waking.  has not noticed any new issues with her sleep but she does snore occasionally. She saw Dr Lorne Lowe and had blood work done along with stress test and CT chest that was WNL. Denies any new breast pain or chest wall changes. No back pain, cough or HA. Has bursitis over the hip which improved after cortisol injection. \par

## 2023-06-19 NOTE — ASSESSMENT
[FreeTextEntry1] : 71 y/o  BRCA negative F with left clinical Stage II to III breast cancer that is ER negative, VA 3%, and Waw4skf negative. She completed AC x 4 Taxol X 4 10/25/18. She had pathologic CR with her triple negative breast cancer. She completed RT to the breast and did not tolerate trial of anastrozole for ER/ VA positive for DCIS. She remains on surveillance: last L breast mammogram was in March and will be having B mammogram / sonogram in December. Will obtain blood work to further evaluate chronic fatigue. She will monitor sleep pattern to see if any correlation or potential sleep apnea. Will obtain copy of work up done with cardiologist: Dr Lowe. We reviewed low fat diet and exercise daily to help improve breast cancer survival as per WINS study. Next follow up in 6 months but earlier if any new symptoms.\par

## 2023-06-20 LAB
ALBUMIN SERPL ELPH-MCNC: 4.1 G/DL
ALP BLD-CCNC: 84 U/L
ALT SERPL-CCNC: 8 U/L
ANION GAP SERPL CALC-SCNC: 11 MMOL/L
AST SERPL-CCNC: 15 U/L
BILIRUB SERPL-MCNC: 0.3 MG/DL
BUN SERPL-MCNC: 15 MG/DL
CALCIUM SERPL-MCNC: 9.4 MG/DL
CANCER AG27-29 SERPL-ACNC: 28 U/ML
CEA SERPL-MCNC: 1.7 NG/ML
CHLORIDE SERPL-SCNC: 108 MMOL/L
CO2 SERPL-SCNC: 25 MMOL/L
CREAT SERPL-MCNC: 0.93 MG/DL
EGFR: 66 ML/MIN/1.73M2
FOLATE SERPL-MCNC: 10 NG/ML
GLUCOSE SERPL-MCNC: 82 MG/DL
IRON SERPL-MCNC: 90 UG/DL
LDH SERPL-CCNC: 196 U/L
NT-PROBNP SERPL-MCNC: 127 PG/ML
POTASSIUM SERPL-SCNC: 4.8 MMOL/L
PROT SERPL-MCNC: 6.4 G/DL
SODIUM SERPL-SCNC: 144 MMOL/L
VIT B12 SERPL-MCNC: 331 PG/ML

## 2023-08-18 ENCOUNTER — APPOINTMENT (OUTPATIENT)
Dept: ORTHOPEDIC SURGERY | Facility: CLINIC | Age: 73
End: 2023-08-18

## 2023-08-22 NOTE — HISTORY OF PRESENT ILLNESS
[Disease: _____________________] : Disease: [unfilled] [AJCC Stage: ____] : AJCC Stage: [unfilled] [de-identified] : Age 67: left breast cancer \par She had noticed left breast pain: burning sensation and felt lump. She had left breast 11:00 biopsy in 4/2018 which showed high grade malignant neoplasm that was JYOTSNA 3 positive, ER negative, MI 3%, and Kll7adz negative Ki 67 of 70%. She had MRI of the breast that showed satellite lesion in the left upper outer quadrant which showed DCIS cribiform pattern with low grade nuclear atypia that was ER >98% and MI 80%. Since her initial mammogram and MRI, she feels the breast mass is growing larger and more tender. She saw Dr Rossi who has referred her for neoadjuvant chemotherapy prior to breast conservation surgery. She had Myriad genetic testing that did not show any actionable mutations. She consented to dose dense ACT neoadjuvant chemotherapy and started on 7/12/18. She tolerated AC well but developed presyncopal event and neutropenia Grade 3 after 1st cycle of paclitaxel. She completed neoadjuvant chemotherapy 10/25/18. On 11/28/18, she underwent left lumpectomy with sentinel lymph node biopsy with Dr Rossi. The pathology showed no residual invasive carcinoma, biopsy changes, and negative 7 lymph nodes.  [de-identified] : ER negative, NH 70%, Cbl6jio negative  [de-identified] : Genetic testing: Myriad: no actionable mutations \par dose dense AC 7/12/18 to 8/23/18 \par dose dense paclitaxel: 9/6/18 -10/25\par 11/28/18- Dr. Rossi lumpectomy and sentinel lymph node biopsy  [de-identified] : She is completing her radiation over the next week. She tolerated RT well so far and looking forward to going to [place] for few days. She feels energy is better but has not returned to exercise yet. She denies any worsening numbness or tingling of the hands or feet. Some unsteadiness and does feel her gait is wider to help stabilize. She denies any nausea or arthralgias or SOB. She denies any new medication. Continues with PPI for GERD. She has concerns about ASA study due to prior gastric sleeve. She is willing to consider Be Well Study.  Nasal Turnover Hinge Flap Text: The defect edges were debeveled with a #15 scalpel blade.  Given the size, depth, location of the defect and the defect being full thickness a nasal turnover hinge flap was deemed most appropriate.  Using a sterile surgical marker, an appropriate hinge flap was drawn incorporating the defect. The area thus outlined was incised with a #15 scalpel blade. The flap was designed to recreate the nasal mucosal lining and the alar rim. The skin margins were undermined to an appropriate distance in all directions utilizing iris scissors.

## 2023-09-21 ENCOUNTER — APPOINTMENT (OUTPATIENT)
Dept: ULTRASOUND IMAGING | Facility: IMAGING CENTER | Age: 73
End: 2023-09-21
Payer: COMMERCIAL

## 2023-09-21 ENCOUNTER — APPOINTMENT (OUTPATIENT)
Dept: MAMMOGRAPHY | Facility: IMAGING CENTER | Age: 73
End: 2023-09-21
Payer: COMMERCIAL

## 2023-09-21 ENCOUNTER — OUTPATIENT (OUTPATIENT)
Dept: OUTPATIENT SERVICES | Facility: HOSPITAL | Age: 73
LOS: 1 days | End: 2023-09-21
Payer: COMMERCIAL

## 2023-09-21 DIAGNOSIS — Z98.890 OTHER SPECIFIED POSTPROCEDURAL STATES: Chronic | ICD-10-CM

## 2023-09-21 DIAGNOSIS — Z90.3 ACQUIRED ABSENCE OF STOMACH [PART OF]: Chronic | ICD-10-CM

## 2023-09-21 DIAGNOSIS — Z00.8 ENCOUNTER FOR OTHER GENERAL EXAMINATION: ICD-10-CM

## 2023-09-21 DIAGNOSIS — Z90.49 ACQUIRED ABSENCE OF OTHER SPECIFIED PARTS OF DIGESTIVE TRACT: Chronic | ICD-10-CM

## 2023-09-21 DIAGNOSIS — Z98.89 OTHER SPECIFIED POSTPROCEDURAL STATES: Chronic | ICD-10-CM

## 2023-09-21 DIAGNOSIS — C50.919 MALIGNANT NEOPLASM OF UNSPECIFIED SITE OF UNSPECIFIED FEMALE BREAST: Chronic | ICD-10-CM

## 2023-09-21 PROCEDURE — G0279: CPT | Mod: 26

## 2023-09-21 PROCEDURE — 77066 DX MAMMO INCL CAD BI: CPT | Mod: 26

## 2023-09-21 PROCEDURE — 76641 ULTRASOUND BREAST COMPLETE: CPT | Mod: 26,50

## 2023-09-21 PROCEDURE — G0279: CPT

## 2023-09-21 PROCEDURE — 77066 DX MAMMO INCL CAD BI: CPT

## 2023-09-21 PROCEDURE — 76641 ULTRASOUND BREAST COMPLETE: CPT

## 2023-10-18 ENCOUNTER — NON-APPOINTMENT (OUTPATIENT)
Age: 73
End: 2023-10-18

## 2023-10-20 ENCOUNTER — APPOINTMENT (OUTPATIENT)
Dept: ENDOCRINOLOGY | Facility: CLINIC | Age: 73
End: 2023-10-20
Payer: COMMERCIAL

## 2023-10-20 VITALS
DIASTOLIC BLOOD PRESSURE: 76 MMHG | HEART RATE: 71 BPM | BODY MASS INDEX: 39.01 KG/M2 | SYSTOLIC BLOOD PRESSURE: 140 MMHG | HEIGHT: 62 IN | OXYGEN SATURATION: 97 % | WEIGHT: 212 LBS

## 2023-10-20 DIAGNOSIS — Z13.820 ENCOUNTER FOR SCREENING FOR OSTEOPOROSIS: ICD-10-CM

## 2023-10-20 PROCEDURE — 99213 OFFICE O/P EST LOW 20 MIN: CPT

## 2023-12-07 ENCOUNTER — OUTPATIENT (OUTPATIENT)
Dept: OUTPATIENT SERVICES | Facility: HOSPITAL | Age: 73
LOS: 1 days | Discharge: HOME | End: 2023-12-07

## 2023-12-07 DIAGNOSIS — Z90.49 ACQUIRED ABSENCE OF OTHER SPECIFIED PARTS OF DIGESTIVE TRACT: Chronic | ICD-10-CM

## 2023-12-07 DIAGNOSIS — Z90.3 ACQUIRED ABSENCE OF STOMACH [PART OF]: Chronic | ICD-10-CM

## 2023-12-07 DIAGNOSIS — Z98.890 OTHER SPECIFIED POSTPROCEDURAL STATES: Chronic | ICD-10-CM

## 2023-12-07 DIAGNOSIS — Z98.89 OTHER SPECIFIED POSTPROCEDURAL STATES: Chronic | ICD-10-CM

## 2023-12-07 DIAGNOSIS — C50.919 MALIGNANT NEOPLASM OF UNSPECIFIED SITE OF UNSPECIFIED FEMALE BREAST: Chronic | ICD-10-CM

## 2023-12-07 DIAGNOSIS — C50.919 MALIGNANT NEOPLASM OF UNSPECIFIED SITE OF UNSPECIFIED FEMALE BREAST: ICD-10-CM

## 2023-12-22 ENCOUNTER — RESULT REVIEW (OUTPATIENT)
Age: 73
End: 2023-12-22

## 2023-12-22 ENCOUNTER — APPOINTMENT (OUTPATIENT)
Dept: HEMATOLOGY ONCOLOGY | Facility: CLINIC | Age: 73
End: 2023-12-22
Payer: COMMERCIAL

## 2023-12-22 VITALS
SYSTOLIC BLOOD PRESSURE: 132 MMHG | BODY MASS INDEX: 38.01 KG/M2 | TEMPERATURE: 97.2 F | HEART RATE: 88 BPM | RESPIRATION RATE: 16 BRPM | OXYGEN SATURATION: 95 % | HEIGHT: 61.97 IN | WEIGHT: 206.57 LBS | DIASTOLIC BLOOD PRESSURE: 82 MMHG

## 2023-12-22 DIAGNOSIS — C50.912 MALIGNANT NEOPLASM OF UNSPECIFIED SITE OF LEFT FEMALE BREAST: ICD-10-CM

## 2023-12-22 LAB
BASOPHILS # BLD AUTO: 0.06 K/UL — SIGNIFICANT CHANGE UP (ref 0–0.2)
BASOPHILS # BLD AUTO: 0.06 K/UL — SIGNIFICANT CHANGE UP (ref 0–0.2)
BASOPHILS NFR BLD AUTO: 0.8 % — SIGNIFICANT CHANGE UP (ref 0–2)
BASOPHILS NFR BLD AUTO: 0.8 % — SIGNIFICANT CHANGE UP (ref 0–2)
EOSINOPHIL # BLD AUTO: 0.2 K/UL — SIGNIFICANT CHANGE UP (ref 0–0.5)
EOSINOPHIL # BLD AUTO: 0.2 K/UL — SIGNIFICANT CHANGE UP (ref 0–0.5)
EOSINOPHIL NFR BLD AUTO: 2.6 % — SIGNIFICANT CHANGE UP (ref 0–6)
EOSINOPHIL NFR BLD AUTO: 2.6 % — SIGNIFICANT CHANGE UP (ref 0–6)
HCT VFR BLD CALC: 38.7 % — SIGNIFICANT CHANGE UP (ref 34.5–45)
HCT VFR BLD CALC: 38.7 % — SIGNIFICANT CHANGE UP (ref 34.5–45)
HGB BLD-MCNC: 12.8 G/DL — SIGNIFICANT CHANGE UP (ref 11.5–15.5)
HGB BLD-MCNC: 12.8 G/DL — SIGNIFICANT CHANGE UP (ref 11.5–15.5)
IMM GRANULOCYTES NFR BLD AUTO: 0.5 % — SIGNIFICANT CHANGE UP (ref 0–0.9)
IMM GRANULOCYTES NFR BLD AUTO: 0.5 % — SIGNIFICANT CHANGE UP (ref 0–0.9)
LYMPHOCYTES # BLD AUTO: 1.38 K/UL — SIGNIFICANT CHANGE UP (ref 1–3.3)
LYMPHOCYTES # BLD AUTO: 1.38 K/UL — SIGNIFICANT CHANGE UP (ref 1–3.3)
LYMPHOCYTES # BLD AUTO: 17.7 % — SIGNIFICANT CHANGE UP (ref 13–44)
LYMPHOCYTES # BLD AUTO: 17.7 % — SIGNIFICANT CHANGE UP (ref 13–44)
MCHC RBC-ENTMCNC: 30.3 PG — SIGNIFICANT CHANGE UP (ref 27–34)
MCHC RBC-ENTMCNC: 30.3 PG — SIGNIFICANT CHANGE UP (ref 27–34)
MCHC RBC-ENTMCNC: 33.1 G/DL — SIGNIFICANT CHANGE UP (ref 32–36)
MCHC RBC-ENTMCNC: 33.1 G/DL — SIGNIFICANT CHANGE UP (ref 32–36)
MCV RBC AUTO: 91.7 FL — SIGNIFICANT CHANGE UP (ref 80–100)
MCV RBC AUTO: 91.7 FL — SIGNIFICANT CHANGE UP (ref 80–100)
MONOCYTES # BLD AUTO: 0.71 K/UL — SIGNIFICANT CHANGE UP (ref 0–0.9)
MONOCYTES # BLD AUTO: 0.71 K/UL — SIGNIFICANT CHANGE UP (ref 0–0.9)
MONOCYTES NFR BLD AUTO: 9.1 % — SIGNIFICANT CHANGE UP (ref 2–14)
MONOCYTES NFR BLD AUTO: 9.1 % — SIGNIFICANT CHANGE UP (ref 2–14)
NEUTROPHILS # BLD AUTO: 5.41 K/UL — SIGNIFICANT CHANGE UP (ref 1.8–7.4)
NEUTROPHILS # BLD AUTO: 5.41 K/UL — SIGNIFICANT CHANGE UP (ref 1.8–7.4)
NEUTROPHILS NFR BLD AUTO: 69.3 % — SIGNIFICANT CHANGE UP (ref 43–77)
NEUTROPHILS NFR BLD AUTO: 69.3 % — SIGNIFICANT CHANGE UP (ref 43–77)
NRBC # BLD: 0 /100 WBCS — SIGNIFICANT CHANGE UP (ref 0–0)
NRBC # BLD: 0 /100 WBCS — SIGNIFICANT CHANGE UP (ref 0–0)
PLATELET # BLD AUTO: 301 K/UL — SIGNIFICANT CHANGE UP (ref 150–400)
PLATELET # BLD AUTO: 301 K/UL — SIGNIFICANT CHANGE UP (ref 150–400)
RBC # BLD: 4.22 M/UL — SIGNIFICANT CHANGE UP (ref 3.8–5.2)
RBC # BLD: 4.22 M/UL — SIGNIFICANT CHANGE UP (ref 3.8–5.2)
RBC # FLD: 13.2 % — SIGNIFICANT CHANGE UP (ref 10.3–14.5)
RBC # FLD: 13.2 % — SIGNIFICANT CHANGE UP (ref 10.3–14.5)
WBC # BLD: 7.8 K/UL — SIGNIFICANT CHANGE UP (ref 3.8–10.5)
WBC # BLD: 7.8 K/UL — SIGNIFICANT CHANGE UP (ref 3.8–10.5)
WBC # FLD AUTO: 7.8 K/UL — SIGNIFICANT CHANGE UP (ref 3.8–10.5)
WBC # FLD AUTO: 7.8 K/UL — SIGNIFICANT CHANGE UP (ref 3.8–10.5)

## 2023-12-22 PROCEDURE — 99213 OFFICE O/P EST LOW 20 MIN: CPT

## 2023-12-22 NOTE — REVIEW OF SYSTEMS
[Diarrhea: Grade 0] : Diarrhea: Grade 0 [Joint Pain] : joint pain [Joint Stiffness] : no joint stiffness [Muscle Pain] : no muscle pain [Muscle Weakness] : no muscle weakness [Negative] : Allergic/Immunologic [FreeTextEntry9] : B knee pain

## 2023-12-22 NOTE — ASSESSMENT
[FreeTextEntry1] : 74 y/o  BRCA negative F with left clinical Stage II to III breast cancer that is ER negative, DC 3%, and Mao7phh negative. She completed AC x 4 Taxol X 4 10/25/18. She had pathologic CR with her triple negative breast cancer. She completed RT to the breast and did not tolerate trial of anastrozole for ER/ DC positive for DCIS. She has completed 5 years of surveillance: likelihood of TNBC recurring after 5 years is low; will change to annual evaluation. She remains on surveillance: breast imaging done in September 2023 reviewed. She will follow up with Dr Rossi. We reviewed signs and symptoms of breast cancer recurrence. No new signs or symptoms of recurrence. Will obtain interval blood work: tumor marker and blood counts. Will add thyroid function for endocrinologist. Questions answered to her satisfaction. She is agreeable with plan. Next follow up in 1 year but earlier if any new symptoms.

## 2023-12-22 NOTE — CONSULT LETTER
[Dear  ___] : Dear  [unfilled], [Courtesy Letter:] : I had the pleasure of seeing your patient, [unfilled], in my office today. [Please see my note below.] : Please see my note below. [Consult Closing:] : Thank you very much for allowing me to participate in the care of this patient.  If you have any questions, please do not hesitate to contact me. [Sincerely,] : Sincerely, [FreeTextEntry2] : Sara Rossi  [facility] [address]  [place] [address] [FreeTextEntry3] : Arian Hussein MD Attending [facility]

## 2023-12-22 NOTE — HISTORY OF PRESENT ILLNESS
[Disease: _____________________] : Disease: [unfilled] [AJCC Stage: ____] : AJCC Stage: [unfilled] [de-identified] : Age 67: left breast cancer \par  She had noticed left breast pain: burning sensation and felt lump. She had left breast 11:00 biopsy in 4/2018 which showed high grade malignant neoplasm that was JYOTSNA 3 positive, ER negative, SD 3%, and Kbk6wnb negative Ki 67 of 70%. She had MRI of the breast that showed satellite lesion in the left upper outer quadrant which showed DCIS cribiform pattern with low grade nuclear atypia that was ER >98% and SD 80%. Since her initial mammogram and MRI, she feels the breast mass is growing larger and more tender. She saw Dr Rossi who has referred her for neoadjuvant chemotherapy prior to breast conservation surgery. She had Myriad genetic testing that did not show any actionable mutations. She consented to dose dense ACT neoadjuvant chemotherapy and started on 7/12/18. She tolerated AC well but developed presyncopal event and neutropenia Grade 3 after 1st cycle of paclitaxel. She completed neoadjuvant chemotherapy 10/25/18. On 11/28/18, she underwent left lumpectomy with sentinel lymph node biopsy with Dr Rossi. The pathology showed no residual invasive carcinoma, biopsy changes, and negative 7 lymph nodes. Had mild elevation in CA 27-29 and had CT and bone scan done 12/2021 that showed no disease.  [de-identified] : ER negative, [place] 3%, Ubd8tlx negative  [de-identified] : Genetic testing: Myriad: no actionable mutations \par  dose dense AC 7/12/18 to 8/23/18 \par  dose dense paclitaxel: 9/6/18 -10/25\par  11/28/18- Dr. Rossi lumpectomy and sentinel lymph node biopsy \par  RT Dr Patel: 2/2019 [de-identified] : Since last evaluation, she saw her endocrinologist who is monitoring her thyroid. She denies any new health changes. Had seen ortho for knee pain and will be having follow up. She had her mammogram/ sonogram done in September: L upper outer scar site no longer hurting as much but will be seeing Dr Rossi and may have area excised.

## 2023-12-22 NOTE — PHYSICAL EXAM
[Fully active, able to carry on all pre-disease performance without restriction] : Status 0 - Fully active, able to carry on all pre-disease performance without restriction [Normal] : affect appropriate [de-identified] : lumpectomy and sentinel lymph node site with left surgical defect over the upper outer left breast with induration but no calor or erythema ; no palpable lymph nodes or new nodules

## 2023-12-26 LAB
ALBUMIN SERPL ELPH-MCNC: 4.4 G/DL
ALP BLD-CCNC: 78 U/L
ALT SERPL-CCNC: 8 U/L
ANION GAP SERPL CALC-SCNC: 12 MMOL/L
AST SERPL-CCNC: 16 U/L
BILIRUB SERPL-MCNC: 0.4 MG/DL
BUN SERPL-MCNC: 21 MG/DL
CALCIUM SERPL-MCNC: 9.6 MG/DL
CANCER AG27-29 SERPL-ACNC: 24.5 U/ML
CHLORIDE SERPL-SCNC: 103 MMOL/L
CO2 SERPL-SCNC: 24 MMOL/L
CREAT SERPL-MCNC: 0.9 MG/DL
EGFR: 68 ML/MIN/1.73M2
GLUCOSE SERPL-MCNC: 88 MG/DL
POTASSIUM SERPL-SCNC: 4.6 MMOL/L
PROT SERPL-MCNC: 6.8 G/DL
SODIUM SERPL-SCNC: 140 MMOL/L
T3FREE SERPL-MCNC: 2.24 PG/ML
T4 FREE SERPL-MCNC: 1.3 NG/DL
TSH SERPL-ACNC: 1.37 UIU/ML

## 2024-01-18 ENCOUNTER — OUTPATIENT (OUTPATIENT)
Dept: OUTPATIENT SERVICES | Facility: HOSPITAL | Age: 74
LOS: 1 days | End: 2024-01-18

## 2024-01-18 VITALS
WEIGHT: 199.96 LBS | RESPIRATION RATE: 16 BRPM | HEIGHT: 61 IN | TEMPERATURE: 97 F | OXYGEN SATURATION: 97 % | SYSTOLIC BLOOD PRESSURE: 116 MMHG | DIASTOLIC BLOOD PRESSURE: 81 MMHG | HEART RATE: 79 BPM

## 2024-01-18 DIAGNOSIS — Z98.890 OTHER SPECIFIED POSTPROCEDURAL STATES: Chronic | ICD-10-CM

## 2024-01-18 DIAGNOSIS — C50.919 MALIGNANT NEOPLASM OF UNSPECIFIED SITE OF UNSPECIFIED FEMALE BREAST: Chronic | ICD-10-CM

## 2024-01-18 DIAGNOSIS — Z98.89 OTHER SPECIFIED POSTPROCEDURAL STATES: Chronic | ICD-10-CM

## 2024-01-18 DIAGNOSIS — L92.3 FOREIGN BODY GRANULOMA OF THE SKIN AND SUBCUTANEOUS TISSUE: ICD-10-CM

## 2024-01-18 DIAGNOSIS — Z90.3 ACQUIRED ABSENCE OF STOMACH [PART OF]: Chronic | ICD-10-CM

## 2024-01-18 DIAGNOSIS — E03.9 HYPOTHYROIDISM, UNSPECIFIED: ICD-10-CM

## 2024-01-18 DIAGNOSIS — Z90.49 ACQUIRED ABSENCE OF OTHER SPECIFIED PARTS OF DIGESTIVE TRACT: Chronic | ICD-10-CM

## 2024-01-18 RX ORDER — OMEPRAZOLE 10 MG/1
1 CAPSULE, DELAYED RELEASE ORAL
Qty: 0 | Refills: 0 | DISCHARGE

## 2024-01-18 RX ORDER — PROCHLORPERAZINE MALEATE 5 MG
1 TABLET ORAL
Qty: 0 | Refills: 0 | DISCHARGE

## 2024-01-18 NOTE — H&P PST ADULT - HISTORY OF PRESENT ILLNESS
73 yr old female presents for PST evaluation for removal of foreign body of left breast, patient reports developing a lump after radiation therapy, doesn't believe "coil dissolved", reports occasional tenderness, redness and itch.

## 2024-01-18 NOTE — H&P PST ADULT - PROBLEM SELECTOR PLAN 1
Patient scheduled for surgery on: 1/26/24  Provided with verbal and written presurgical instructions  Verbalized understanding  with teach back on the following: Famotidine for GI prophylaxis and chlorhexidine wash    Lab results in chart      Hx of lymph node removal , left arm precautions

## 2024-01-18 NOTE — H&P PST ADULT - NSICDXPASTMEDICALHX_GEN_ALL_CORE_FT
PAST MEDICAL HISTORY:  Anemia with chemotherapy    Asthma prior to gastric sleeve/ no meds    Breast cancer, left dx 5-2018  laine  6-2018 x 2 chemotherapy from 5-2018-10-25-18    Chronic fatigue syndrome prior to gastric sleeve    Esophagitis     Foreign body granuloma of the skin and subcutaneous tissue     GERD (gastroesophageal reflux disease) diet-controlled    Hiatal hernia s/p repair with gastric sleeve    History of chemotherapy     Hypothyroidism     Leukocytosis with chemotherapy    Malignant neoplasm of breast left, diagnosed 4/2018    Migraine last 5-2018    Morbid obesity with BMI of 40.0-44.9, adult s/p sleeve gastrectomy 2017 pt lost 65 pounds    MVP (mitral valve prolapse) recent echo 6/27/18    S/P radiation therapy     Shingles outbreak left orbital area 5/7/14

## 2024-01-18 NOTE — H&P PST ADULT - NSICDXFAMILYHX_GEN_ALL_CORE_FT
FAMILY HISTORY:  Father  Still living? No  Family history of renal cancer, Age at diagnosis: Age Unknown  FH: heart disease, Age at diagnosis: Age Unknown    Mother  Still living? No  FH: breast cancer, Age at diagnosis: Age Unknown    Sibling  Still living? No  FH: breast cancer, Age at diagnosis: Age Unknown

## 2024-01-18 NOTE — H&P PST ADULT - SKIN/BREAST COMMENTS
History of left breast cancer in 2018, s/p lumpectomy, chemo and RT, reports after radiation ended in 2019- gradually developed left breast mass, with intermittent redness and tenderness

## 2024-01-18 NOTE — H&P PST ADULT - NSICDXPASTSURGICALHX_GEN_ALL_CORE_FT
PAST SURGICAL HISTORY:  H/O hernia repair Jrgagkenia, 2014    History of sleeve gastrectomy 2017 with repair of hiatal hernia    S/P appendectomy     S/P breast biopsy, left left breast cancer     S/P  x3 (, , )    S/P laparoscopic cholecystectomy 3/8/18    S/P laparoscopy repair of diaphragmatic hernia     S/P lumpectomy, left breast

## 2024-01-25 ENCOUNTER — TRANSCRIPTION ENCOUNTER (OUTPATIENT)
Age: 74
End: 2024-01-25

## 2024-01-26 ENCOUNTER — OUTPATIENT (OUTPATIENT)
Dept: OUTPATIENT SERVICES | Facility: HOSPITAL | Age: 74
LOS: 1 days | Discharge: HOME | End: 2024-01-26
Payer: COMMERCIAL

## 2024-01-26 ENCOUNTER — TRANSCRIPTION ENCOUNTER (OUTPATIENT)
Age: 74
End: 2024-01-26

## 2024-01-26 VITALS
TEMPERATURE: 98 F | HEART RATE: 70 BPM | RESPIRATION RATE: 16 BRPM | OXYGEN SATURATION: 95 % | WEIGHT: 199.96 LBS | HEIGHT: 61 IN | SYSTOLIC BLOOD PRESSURE: 126 MMHG | DIASTOLIC BLOOD PRESSURE: 71 MMHG

## 2024-01-26 VITALS
DIASTOLIC BLOOD PRESSURE: 58 MMHG | OXYGEN SATURATION: 100 % | RESPIRATION RATE: 16 BRPM | TEMPERATURE: 98 F | SYSTOLIC BLOOD PRESSURE: 103 MMHG | HEART RATE: 66 BPM

## 2024-01-26 DIAGNOSIS — Z98.890 OTHER SPECIFIED POSTPROCEDURAL STATES: Chronic | ICD-10-CM

## 2024-01-26 DIAGNOSIS — Z98.89 OTHER SPECIFIED POSTPROCEDURAL STATES: Chronic | ICD-10-CM

## 2024-01-26 DIAGNOSIS — L92.3 FOREIGN BODY GRANULOMA OF THE SKIN AND SUBCUTANEOUS TISSUE: ICD-10-CM

## 2024-01-26 DIAGNOSIS — Z90.49 ACQUIRED ABSENCE OF OTHER SPECIFIED PARTS OF DIGESTIVE TRACT: Chronic | ICD-10-CM

## 2024-01-26 DIAGNOSIS — Z90.3 ACQUIRED ABSENCE OF STOMACH [PART OF]: Chronic | ICD-10-CM

## 2024-01-26 PROCEDURE — 88300 SURGICAL PATH GROSS: CPT | Mod: 26

## 2024-01-26 RX ORDER — LEVOTHYROXINE SODIUM 125 MCG
1 TABLET ORAL
Refills: 0 | DISCHARGE

## 2024-01-26 NOTE — BRIEF OPERATIVE NOTE - NSICDXBRIEFPOSTOP_GEN_ALL_CORE_FT
POST-OP DIAGNOSIS:  Foreign body granuloma of the skin and subcutaneous tissue 26-Jan-2024 13:36:47  Janessa Odom

## 2024-01-26 NOTE — ASU PREOP CHECKLIST - NS PREOP CHK MONITOR ANESTHESIA CONSENT
Dr. Andrei Pacheco and Malgorzata WINSTON at bedside for assessment.      Ángel Francois RN  07/11/20 193 done

## 2024-01-26 NOTE — ASU PREOP CHECKLIST - SIDE RAILS UP
I will SWITCH the dose or number of times a day I take the medications listed below when I get home from the hospital:  None n/a

## 2024-01-26 NOTE — ASU DISCHARGE PLAN (ADULT/PEDIATRIC) - PAIN MANAGEMENT
Take over the counter pain medication if needed, next Tylenol at 6:45pm./Take over the counter pain medication

## 2024-01-26 NOTE — ASU DISCHARGE PLAN (ADULT/PEDIATRIC) - CARE PROVIDER_API CALL
Brenda Rossiam  Surgery  [address] [place] [address], [address]  [place] [address]  Phone: [phone]  Fax: [phone]  Follow Up Time: 2 weeks

## 2024-01-26 NOTE — ASU DISCHARGE PLAN (ADULT/PEDIATRIC) - ASU DC SPECIAL INSTRUCTIONSFT
Breast Foreign Body Excision Post-operative Instructions  1.	Supportive bra- Please bring a sports/athletic bra with you on the day of your surgery.  You will wear it home from the hospital.  You should wear the supportive bra continuously for 48 hours after the procedure, including wearing it to sleep.  Thereafter, you may wear your regular bra.  You may remove the bra to shower.  The sports bra will provide support, decrease the amount of swelling at the biopsy site, and make your recovery more comfortable.    2.	Wound dressing- There are white surgical tapes (called steri strips) which are directly adherent to the skin.  These should remain on the skin, and will peel off naturally.  All of the stitches are “internal” and will dissolve naturally.    3.	Showering/Bathing- You may shower over the dressing the very next day after surgery.  Allow the water to run over the dressing, but don not scrub the area.  It is best not to sit in a bathtub or swimming pool for at least one week after surgery.    4.	Activity level- You may resume most normal daily activity as tolerated, but avoid strenuous activities such as aerobics, jogging, exercising or heavy lifting for at least 1 week after surgery.  You may return to work in 1-2 days after surgery.  You may drive as long as you are not taking any prescription pain medication.    5.	Pain Medication- You may take extra-strength Tylenol as needed.  Please don't take aspirin, Motrin, Advil or any other anti-inflammatory medications, as these medications may cause bleeding or bruising.    6.	Follow-up Appointment- Please call the office to schedule your post-operative appointment which should be approximately 10 days after your surgery. Office [phone]    7.	Bruising/Bleeding/Swelling- It is normal for there to be some bruising and swelling at the breast biopsy site, and there may be some staining of blood on to the dressing.  Some discomfort at the surgical site is expected.  If your symptoms seem excessive, or if you have any question or concerns, please call the office.      Anesthesia Precautions:  For the next 12 hours do not:   •	drive a car,  •	drink alcohol, beer, or wine,   •	make important personal or business decisions  Diet:   •	Progress diet slowly unless otherwise indicated    Physician Notification  •	Any Prescription issues  •	Bleeding that does not stop  •	Persistent nausea and vomiting  •	Pain not relieved by medications  •	Fever greater than 101®F  •	Inability to tolerate liquids or foods  •	Unable to urinate after 8 hours  Discharge and Disposition  •	Discharge to home  •	Accompanied by Family/Spouse/ Parents/ Significant Other/ Friend/ and or Caregiver    Follow Up Care:  •	In the event that you develop a complication and you are unable to reach your own physician, you may contact:  [phone] or go to the nearest Emergency Room.   •	Please call your surgeon to schedule your follow up appointment [phone]

## 2024-01-26 NOTE — BRIEF OPERATIVE NOTE - NSICDXBRIEFPREOP_GEN_ALL_CORE_FT
PRE-OP DIAGNOSIS:  Foreign body granuloma of the skin and subcutaneous tissue 26-Jan-2024 13:36:25  Janessa Odom

## 2024-01-26 NOTE — ASU DISCHARGE PLAN (ADULT/PEDIATRIC) - NS MD DC FALL RISK RISK
For information on Fall & Injury Prevention, visit: [email or web address] OR  [email or web address] OR  https://www.cdc.gov/steadi/patient.html

## 2024-01-26 NOTE — BRIEF OPERATIVE NOTE - OPERATION/FINDINGS
Incision was made along the edge of the L areola. The scar tissue was dissected and released. The foreign body granuloma was removed with electrocautery and scissors. Hemostasis was achieved. The incision was closed with sutures and covered with steris.

## 2024-01-26 NOTE — BRIEF OPERATIVE NOTE - NSICDXBRIEFPROCEDURE_GEN_ALL_CORE_FT
PROCEDURES:  Simple removal, foreign body 26-Jan-2024 13:35:41 Foreign body removal from left breast Janessa Odom

## 2024-01-31 LAB — SURGICAL PATHOLOGY STUDY: SIGNIFICANT CHANGE UP

## 2024-02-01 PROBLEM — Z92.3 PERSONAL HISTORY OF IRRADIATION: Chronic | Status: ACTIVE | Noted: 2024-01-18

## 2024-02-01 PROBLEM — E03.9 HYPOTHYROIDISM, UNSPECIFIED: Chronic | Status: ACTIVE | Noted: 2024-01-18

## 2024-02-01 PROBLEM — L92.3 FOREIGN BODY GRANULOMA OF THE SKIN AND SUBCUTANEOUS TISSUE: Chronic | Status: ACTIVE | Noted: 2024-01-18

## 2024-02-01 PROBLEM — Z92.21 PERSONAL HISTORY OF ANTINEOPLASTIC CHEMOTHERAPY: Chronic | Status: ACTIVE | Noted: 2024-01-18

## 2024-02-05 ENCOUNTER — APPOINTMENT (OUTPATIENT)
Dept: RADIOLOGY | Facility: IMAGING CENTER | Age: 74
End: 2024-02-05
Payer: COMMERCIAL

## 2024-02-05 ENCOUNTER — OUTPATIENT (OUTPATIENT)
Dept: OUTPATIENT SERVICES | Facility: HOSPITAL | Age: 74
LOS: 1 days | End: 2024-02-05
Payer: COMMERCIAL

## 2024-02-05 DIAGNOSIS — Z00.8 ENCOUNTER FOR OTHER GENERAL EXAMINATION: ICD-10-CM

## 2024-02-05 DIAGNOSIS — Z98.89 OTHER SPECIFIED POSTPROCEDURAL STATES: Chronic | ICD-10-CM

## 2024-02-05 DIAGNOSIS — Z90.3 ACQUIRED ABSENCE OF STOMACH [PART OF]: Chronic | ICD-10-CM

## 2024-02-05 DIAGNOSIS — Z90.49 ACQUIRED ABSENCE OF OTHER SPECIFIED PARTS OF DIGESTIVE TRACT: Chronic | ICD-10-CM

## 2024-02-05 DIAGNOSIS — Z98.890 OTHER SPECIFIED POSTPROCEDURAL STATES: Chronic | ICD-10-CM

## 2024-02-05 PROCEDURE — 77080 DXA BONE DENSITY AXIAL: CPT

## 2024-02-05 PROCEDURE — 77080 DXA BONE DENSITY AXIAL: CPT | Mod: 26

## 2024-04-05 ENCOUNTER — APPOINTMENT (OUTPATIENT)
Dept: ENDOCRINOLOGY | Facility: CLINIC | Age: 74
End: 2024-04-05

## 2024-04-09 ENCOUNTER — APPOINTMENT (OUTPATIENT)
Dept: ENDOCRINOLOGY | Facility: CLINIC | Age: 74
End: 2024-04-09
Payer: COMMERCIAL

## 2024-04-09 VITALS
WEIGHT: 211 LBS | HEART RATE: 76 BPM | DIASTOLIC BLOOD PRESSURE: 81 MMHG | OXYGEN SATURATION: 94 % | BODY MASS INDEX: 39.84 KG/M2 | HEIGHT: 61 IN | SYSTOLIC BLOOD PRESSURE: 135 MMHG

## 2024-04-09 DIAGNOSIS — E03.9 HYPOTHYROIDISM, UNSPECIFIED: ICD-10-CM

## 2024-04-09 DIAGNOSIS — M85.89 OTHER SPECIFIED DISORDERS OF BONE DENSITY AND STRUCTURE, MULTIPLE SITES: ICD-10-CM

## 2024-04-09 PROCEDURE — 99214 OFFICE O/P EST MOD 30 MIN: CPT | Mod: 25

## 2024-04-09 PROCEDURE — 36415 COLL VENOUS BLD VENIPUNCTURE: CPT

## 2024-04-09 NOTE — HISTORY OF PRESENT ILLNESS
[FreeTextEntry1] : ROX SOMMER  is a 72 yo female with past medical history of obesity s/p gastric sleeve in 2017, breast cancer, hypothyroidism, GERD, and asthma who presents for hypothyroidism follow up  Patient was diagnosed with hypothyroidism on routine bloodwork in April/May 2022.  She is on levothyroxine 88mcg daily as per last endocrine visit in Oct 2023.  She has history of breast cancer, she notes she is not on chemotherapy or immunotherapy, s/p RT in Feb 2019, she notes she had 1 month of RT sessions. She denies dyspnea, dysphagia, dysphonia, denies changes in bowel habits including diarrhea or constipation.

## 2024-04-09 NOTE — ASSESSMENT
[Levothyroxine] : The patient was instructed to take Levothyroxine on an empty stomach, separate from vitamins, and wait at least 30 minutes before eating [FreeTextEntry1] : History of hypothyroidism Has h/o breast cancer, but denies h/o recent chemotherapy or immunotherapy, admitted to [facility] in 2019. Continue levothyroxine 88mcg daily Patient is clinically euthyroid today, will repeat TFTs today Bloodwork was collected in office today, will f/u results accordingly.   Osteopenia Patient admits to history of recent fall , no fractures, has noted bursitis of right leg Reviewed bone density from Feb 2024 with patient today, noted osteopenia of spine and femoral neck. Noted 10 yr FRAX score is 12% of any major osteoporotic fracture and 10 yr risk of hip fracture is 2.5% Not currently on therapy   Answered all questions today; patient verbalized understanding of the above RTO in 6 months.

## 2024-04-12 LAB
T4 FREE SERPL-MCNC: 1.8 NG/DL
TSH SERPL-ACNC: 0.12 UIU/ML

## 2024-07-02 ENCOUNTER — RX RENEWAL (OUTPATIENT)
Age: 74
End: 2024-07-02

## 2024-09-20 ENCOUNTER — APPOINTMENT (OUTPATIENT)
Dept: ORTHOPEDIC SURGERY | Facility: CLINIC | Age: 74
End: 2024-09-20
Payer: COMMERCIAL

## 2024-09-20 DIAGNOSIS — M47.816 SPONDYLOSIS W/OUT MYELOPATHY OR RADICULOPATHY, LUMBAR REGION: ICD-10-CM

## 2024-09-20 DIAGNOSIS — M79.10 MYALGIA, UNSPECIFIED SITE: ICD-10-CM

## 2024-09-20 DIAGNOSIS — M70.62 TROCHANTERIC BURSITIS, LEFT HIP: ICD-10-CM

## 2024-09-20 DIAGNOSIS — M79.18 MYALGIA, OTHER SITE: ICD-10-CM

## 2024-09-20 DIAGNOSIS — M70.61 TROCHANTERIC BURSITIS, RIGHT HIP: ICD-10-CM

## 2024-09-20 DIAGNOSIS — M54.16 RADICULOPATHY, LUMBAR REGION: ICD-10-CM

## 2024-09-20 PROCEDURE — 20552 NJX 1/MLT TRIGGER POINT 1/2: CPT

## 2024-09-20 PROCEDURE — 72170 X-RAY EXAM OF PELVIS: CPT

## 2024-09-20 PROCEDURE — 72110 X-RAY EXAM L-2 SPINE 4/>VWS: CPT

## 2024-09-20 PROCEDURE — J3490M: CUSTOM

## 2024-09-20 PROCEDURE — 99214 OFFICE O/P EST MOD 30 MIN: CPT | Mod: 25

## 2024-09-20 RX ORDER — METHYLPREDNISOLONE 4 MG/1
4 TABLET ORAL
Qty: 1 | Refills: 1 | Status: ACTIVE | COMMUNITY
Start: 2024-09-20 | End: 1900-01-01

## 2024-09-20 NOTE — HISTORY OF PRESENT ILLNESS
[de-identified] : 09/20/2024: 73 year old female presents today with complaints of left sided low back pain and spasm with Left anterior thigh pain. She reports symptoms started 3 weeks ago with no acute trauma or injury. Worsens with ambulating -mostly back.  She also reports Hx of R GT bursitis and burning sensation in anterior thigh. Has Had GT CSI with short term relief.   Currently taken tylenol dual action prn. Doing yoga for relief.   PmHx: Hx of Breast Ca-Left-s/p Chemo/RT 2018 ([facility]), hypothyroid All: NKDA  xrays today: L spine - lumbar scoliosis, spondylosis (compared to older xrays from 2022 and there is no change) ap pelvis - NEGATIVE     [FreeTextEntry1] : lower back pain

## 2024-09-20 NOTE — PHYSICAL EXAM
[Bilateral] : hip bilaterally [] : negative straight leg raise [FreeTextEntry3] : VERY TENDER over the greater troch

## 2024-09-20 NOTE — DISCUSSION/SUMMARY
[Medication Risks Reviewed] : Medication risks reviewed [de-identified] : reviewed the case and the imaging with the patient  LUMBAR SPONDYLOSIS AND hip bursitiist discussion of the condition and treatment options cautions discussed questions answered discussion of natural history of the condition and what the next step would be TPi/PT/mdp

## 2024-09-20 NOTE — PROCEDURE
[Trigger point 1-2 muscle groups] : trigger point 1-2 muscle groups [Lumbar paraspinal muscle] : lumbar paraspinal muscle [Pain] : pain [Alcohol] : alcohol [Betadine] : betadine [Ethyl Chloride sprayed topically] : ethyl chloride sprayed topically [___ cc    1%] : Lidocaine ~Vcc of 1%  [___ cc    0.25%] : Bupivacaine (Marcaine) ~Vcc of 0.25%  [___ cc    10mg] : Triamcinolone (Kenalog) ~Vcc of 10 mg  [Risks, benefits, alternatives discussed / Verbal consent obtained] : the risks benefits, and alternatives have been discussed, and verbal consent was obtained [FreeTextEntry3] : the pain was 8 prior the injection and 4 after

## 2024-10-09 ENCOUNTER — APPOINTMENT (OUTPATIENT)
Dept: ENDOCRINOLOGY | Facility: CLINIC | Age: 74
End: 2024-10-09
Payer: COMMERCIAL

## 2024-10-09 VITALS
OXYGEN SATURATION: 93 % | DIASTOLIC BLOOD PRESSURE: 83 MMHG | HEIGHT: 61 IN | HEART RATE: 90 BPM | SYSTOLIC BLOOD PRESSURE: 150 MMHG | BODY MASS INDEX: 39.27 KG/M2 | WEIGHT: 208 LBS

## 2024-10-09 DIAGNOSIS — E03.9 HYPOTHYROIDISM, UNSPECIFIED: ICD-10-CM

## 2024-10-09 DIAGNOSIS — R73.03 PREDIABETES.: ICD-10-CM

## 2024-10-09 DIAGNOSIS — M85.89 OTHER SPECIFIED DISORDERS OF BONE DENSITY AND STRUCTURE, MULTIPLE SITES: ICD-10-CM

## 2024-10-09 PROCEDURE — 99214 OFFICE O/P EST MOD 30 MIN: CPT

## 2024-10-30 ENCOUNTER — OUTPATIENT (OUTPATIENT)
Dept: OUTPATIENT SERVICES | Facility: HOSPITAL | Age: 74
LOS: 1 days | End: 2024-10-30
Payer: COMMERCIAL

## 2024-10-30 ENCOUNTER — APPOINTMENT (OUTPATIENT)
Dept: ULTRASOUND IMAGING | Facility: IMAGING CENTER | Age: 74
End: 2024-10-30
Payer: COMMERCIAL

## 2024-10-30 ENCOUNTER — APPOINTMENT (OUTPATIENT)
Dept: MAMMOGRAPHY | Facility: IMAGING CENTER | Age: 74
End: 2024-10-30
Payer: COMMERCIAL

## 2024-10-30 DIAGNOSIS — Z98.890 OTHER SPECIFIED POSTPROCEDURAL STATES: Chronic | ICD-10-CM

## 2024-10-30 DIAGNOSIS — Z90.49 ACQUIRED ABSENCE OF OTHER SPECIFIED PARTS OF DIGESTIVE TRACT: Chronic | ICD-10-CM

## 2024-10-30 DIAGNOSIS — Z00.8 ENCOUNTER FOR OTHER GENERAL EXAMINATION: ICD-10-CM

## 2024-10-30 DIAGNOSIS — Z90.3 ACQUIRED ABSENCE OF STOMACH [PART OF]: Chronic | ICD-10-CM

## 2024-10-30 DIAGNOSIS — Z98.89 OTHER SPECIFIED POSTPROCEDURAL STATES: Chronic | ICD-10-CM

## 2024-10-30 PROCEDURE — 77066 DX MAMMO INCL CAD BI: CPT

## 2024-10-30 PROCEDURE — 76641 ULTRASOUND BREAST COMPLETE: CPT | Mod: 26,50

## 2024-10-30 PROCEDURE — G0279: CPT | Mod: 26

## 2024-10-30 PROCEDURE — 77066 DX MAMMO INCL CAD BI: CPT | Mod: 26

## 2024-10-30 PROCEDURE — 76641 ULTRASOUND BREAST COMPLETE: CPT

## 2024-10-30 PROCEDURE — G0279: CPT

## 2024-11-29 ENCOUNTER — OUTPATIENT (OUTPATIENT)
Dept: OUTPATIENT SERVICES | Facility: HOSPITAL | Age: 74
LOS: 1 days | Discharge: HOME | End: 2024-11-29

## 2024-11-29 DIAGNOSIS — C50.919 MALIGNANT NEOPLASM OF UNSPECIFIED SITE OF UNSPECIFIED FEMALE BREAST: ICD-10-CM

## 2024-11-29 DIAGNOSIS — Z90.49 ACQUIRED ABSENCE OF OTHER SPECIFIED PARTS OF DIGESTIVE TRACT: Chronic | ICD-10-CM

## 2024-11-29 DIAGNOSIS — Z98.89 OTHER SPECIFIED POSTPROCEDURAL STATES: Chronic | ICD-10-CM

## 2024-11-29 DIAGNOSIS — Z98.890 OTHER SPECIFIED POSTPROCEDURAL STATES: Chronic | ICD-10-CM

## 2024-11-29 DIAGNOSIS — Z90.3 ACQUIRED ABSENCE OF STOMACH [PART OF]: Chronic | ICD-10-CM

## 2024-12-09 ENCOUNTER — RESULT REVIEW (OUTPATIENT)
Age: 74
End: 2024-12-09

## 2024-12-09 ENCOUNTER — LABORATORY RESULT (OUTPATIENT)
Age: 74
End: 2024-12-09

## 2024-12-09 ENCOUNTER — APPOINTMENT (OUTPATIENT)
Dept: HEMATOLOGY ONCOLOGY | Facility: CLINIC | Age: 74
End: 2024-12-09
Payer: COMMERCIAL

## 2024-12-09 VITALS
HEART RATE: 69 BPM | TEMPERATURE: 97.1 F | WEIGHT: 209.88 LBS | DIASTOLIC BLOOD PRESSURE: 83 MMHG | RESPIRATION RATE: 17 BRPM | BODY MASS INDEX: 39.66 KG/M2 | SYSTOLIC BLOOD PRESSURE: 131 MMHG | OXYGEN SATURATION: 97 %

## 2024-12-09 DIAGNOSIS — C50.912 MALIGNANT NEOPLASM OF UNSPECIFIED SITE OF LEFT FEMALE BREAST: ICD-10-CM

## 2024-12-09 LAB
ALBUMIN SERPL ELPH-MCNC: 4.1 G/DL
ALP BLD-CCNC: 84 U/L
ALT SERPL-CCNC: 7 U/L
ANION GAP SERPL CALC-SCNC: 15 MMOL/L
AST SERPL-CCNC: 15 U/L
BASOPHILS # BLD AUTO: 0.07 K/UL — SIGNIFICANT CHANGE UP (ref 0–0.2)
BASOPHILS NFR BLD AUTO: 1.1 % — SIGNIFICANT CHANGE UP (ref 0–2)
BILIRUB SERPL-MCNC: 0.4 MG/DL
BUN SERPL-MCNC: 21 MG/DL
CALCIUM SERPL-MCNC: 9.7 MG/DL
CEA SERPL-MCNC: 1.8 NG/ML
CHLORIDE SERPL-SCNC: 105 MMOL/L
CO2 SERPL-SCNC: 22 MMOL/L
CREAT SERPL-MCNC: 0.98 MG/DL
EGFR: 61 ML/MIN/1.73M2
EOSINOPHIL # BLD AUTO: 0.19 K/UL — SIGNIFICANT CHANGE UP (ref 0–0.5)
EOSINOPHIL NFR BLD AUTO: 3 % — SIGNIFICANT CHANGE UP (ref 0–6)
GLUCOSE SERPL-MCNC: 88 MG/DL
HCT VFR BLD CALC: 35.8 % — SIGNIFICANT CHANGE UP (ref 34.5–45)
HGB BLD-MCNC: 11.6 G/DL — SIGNIFICANT CHANGE UP (ref 11.5–15.5)
IMM GRANULOCYTES NFR BLD AUTO: 0.2 % — SIGNIFICANT CHANGE UP (ref 0–0.9)
LYMPHOCYTES # BLD AUTO: 1.42 K/UL — SIGNIFICANT CHANGE UP (ref 1–3.3)
LYMPHOCYTES # BLD AUTO: 22 % — SIGNIFICANT CHANGE UP (ref 13–44)
MCHC RBC-ENTMCNC: 30 PG — SIGNIFICANT CHANGE UP (ref 27–34)
MCHC RBC-ENTMCNC: 32.4 G/DL — SIGNIFICANT CHANGE UP (ref 32–36)
MCV RBC AUTO: 92.5 FL — SIGNIFICANT CHANGE UP (ref 80–100)
MONOCYTES # BLD AUTO: 0.84 K/UL — SIGNIFICANT CHANGE UP (ref 0–0.9)
MONOCYTES NFR BLD AUTO: 13 % — SIGNIFICANT CHANGE UP (ref 2–14)
NEUTROPHILS # BLD AUTO: 3.91 K/UL — SIGNIFICANT CHANGE UP (ref 1.8–7.4)
NEUTROPHILS NFR BLD AUTO: 60.7 % — SIGNIFICANT CHANGE UP (ref 43–77)
NRBC # BLD: 0 /100 WBCS — SIGNIFICANT CHANGE UP (ref 0–0)
NRBC BLD-RTO: 0 /100 WBCS — SIGNIFICANT CHANGE UP (ref 0–0)
PLATELET # BLD AUTO: 292 K/UL — SIGNIFICANT CHANGE UP (ref 150–400)
POTASSIUM SERPL-SCNC: 4.7 MMOL/L
PROT SERPL-MCNC: 6.8 G/DL
RBC # BLD: 3.87 M/UL — SIGNIFICANT CHANGE UP (ref 3.8–5.2)
RBC # FLD: 13.2 % — SIGNIFICANT CHANGE UP (ref 10.3–14.5)
SODIUM SERPL-SCNC: 143 MMOL/L
TSH SERPL-ACNC: 0.01 UIU/ML
WBC # BLD: 6.44 K/UL — SIGNIFICANT CHANGE UP (ref 3.8–10.5)
WBC # FLD AUTO: 6.44 K/UL — SIGNIFICANT CHANGE UP (ref 3.8–10.5)

## 2024-12-09 PROCEDURE — G2211 COMPLEX E/M VISIT ADD ON: CPT | Mod: NC

## 2024-12-09 PROCEDURE — 99214 OFFICE O/P EST MOD 30 MIN: CPT

## 2024-12-17 ENCOUNTER — NON-APPOINTMENT (OUTPATIENT)
Age: 74
End: 2024-12-17

## 2024-12-17 LAB — CANCER AG27-29 SERPL-ACNC: 27.2 U/ML

## 2024-12-23 NOTE — ED PROVIDER NOTE - CROS ED GU ALL NEG
Nerve Block    Date/Time: 12/23/2024 12:27 PM    Performed by: Marvin Stockton MD  Authorized by: Marvin Stockton MD    Block Type:supraclavicular  Laterality:  Left  Patient Location:  Pre-op  Indication: post-op pain management at surgeon's request    Preanesthetic Checklist Patient Identified (2 criteria), Block Plan Confirmed, Resuscitation Equipment Available, Supplemental O2 (if needed), Allergies Confirmed, Block Site Marked (if applicable), Monitors Applied, Aseptic Technique, Coagulation Status, Necessary Block Equipment Present, Timeout Performed, IV Access Functioning, Consent Verified, Drugs/Solutions Labeled and Sedation Given (if needed)    Patient Position:  Supine  Prep:  Chlorhexidine gluconate (CHG)  Max Sterile Barrier Technique:  Hand washing, cap/mask, sterile gloves, sterile towel drapes, sterile probe cover and sterile gel  Monitoring:  Continuous pulse oximetry, blood pressure and EKG  Injection Technique:  Single-shot  Procedures: ultrasound guided and ultrasound permanent image saved    Needle Gauge:  22 G  Needle Length:  5 cm  Needle Localization:  Ultrasound guidance   in-plane  Physical status during block:  Awake  Medications Administered  ropivacaine (NAROPIN) 0.5 % - Infiltration   30 mL - 12/23/2024 12:39:00 PM  Injection Assessment:  Negative aspiration for heme, no resistance to injection, no paresthesia on injection, incremental injection and local visualized surrounding nerve on ultrasound  Patient Condition:  Tolerated well, no immediate complications  Paresthesia Pain:  None  Slowly Injected: Yes    Start Time:12/23/2024 12:27 PM  Stop Time: 12/23/2024 12:39 PM       negative...

## 2024-12-31 ENCOUNTER — RX RENEWAL (OUTPATIENT)
Age: 74
End: 2024-12-31

## 2025-03-10 ENCOUNTER — APPOINTMENT (OUTPATIENT)
Dept: ENDOCRINOLOGY | Facility: CLINIC | Age: 75
End: 2025-03-10

## 2025-04-22 NOTE — REVIEW OF SYSTEMS
Anesthesia Post-op Note    Patient: Sonia Reis  Procedure(s) Performed: REVISION/LOOSEN URETHRAL SLING, VAGINAL APPROACH (Urethra)  Anesthesia type: General    Vitals Value Taken Time   Temp 36.5 °C (97.7 °F) 04/22/25 1129   Pulse 75 04/22/25 1129   Resp 16 04/22/25 1129   SpO2 100 % 04/22/25 1129   /58 04/22/25 1129         Patient Location: PACU Phase 1  Post-op Vital Signs:stable  Level of Consciousness: awake and alert  Respiratory Status: spontaneous ventilation  Cardiovascular stable  Hydration: euvolemic  Pain Management: adequately controlled  Handoff: Handoff to receiving clinician was performed and questions were answered  Vomiting: none  Nausea: None  Airway Patency:patent  Post-op Assessment: no complications and patient tolerated procedure well      There were no known notable events for this encounter.                       [Negative] : Allergic/Immunologic [Dysphagia] : no dysphagia [Loss of Hearing] : no loss of hearing [Nosebleeds] : no nosebleeds [Hoarseness] : no hoarseness [Odynophagia] : no odynophagia [Mucosal Pain] : no mucosal pain [Abdominal Pain] : no abdominal pain [Vomiting] : no vomiting [Constipation] : no constipation [Diarrhea] : no diarrhea [FreeTextEntry4] : gums burn occasionally with eating  [FreeTextEntry7] : loose stool

## 2025-05-05 ENCOUNTER — OUTPATIENT (OUTPATIENT)
Dept: OUTPATIENT SERVICES | Facility: HOSPITAL | Age: 75
LOS: 1 days | End: 2025-05-05
Payer: COMMERCIAL

## 2025-05-05 ENCOUNTER — APPOINTMENT (OUTPATIENT)
Dept: MAMMOGRAPHY | Facility: IMAGING CENTER | Age: 75
End: 2025-05-05
Payer: COMMERCIAL

## 2025-05-05 ENCOUNTER — APPOINTMENT (OUTPATIENT)
Dept: ULTRASOUND IMAGING | Facility: IMAGING CENTER | Age: 75
End: 2025-05-05
Payer: COMMERCIAL

## 2025-05-05 DIAGNOSIS — Z90.49 ACQUIRED ABSENCE OF OTHER SPECIFIED PARTS OF DIGESTIVE TRACT: Chronic | ICD-10-CM

## 2025-05-05 DIAGNOSIS — Z98.890 OTHER SPECIFIED POSTPROCEDURAL STATES: Chronic | ICD-10-CM

## 2025-05-05 DIAGNOSIS — Z90.3 ACQUIRED ABSENCE OF STOMACH [PART OF]: Chronic | ICD-10-CM

## 2025-05-05 DIAGNOSIS — Z00.8 ENCOUNTER FOR OTHER GENERAL EXAMINATION: ICD-10-CM

## 2025-05-05 DIAGNOSIS — Z98.89 OTHER SPECIFIED POSTPROCEDURAL STATES: Chronic | ICD-10-CM

## 2025-05-05 PROCEDURE — 77065 DX MAMMO INCL CAD UNI: CPT

## 2025-05-05 PROCEDURE — 77065 DX MAMMO INCL CAD UNI: CPT | Mod: 26,LT

## 2025-05-05 PROCEDURE — G0279: CPT

## 2025-05-05 PROCEDURE — 76641 ULTRASOUND BREAST COMPLETE: CPT | Mod: 26,LT

## 2025-05-05 PROCEDURE — 76641 ULTRASOUND BREAST COMPLETE: CPT

## 2025-05-05 PROCEDURE — G0279: CPT | Mod: 26

## 2025-07-08 ENCOUNTER — APPOINTMENT (OUTPATIENT)
Dept: ENDOCRINOLOGY | Facility: CLINIC | Age: 75
End: 2025-07-08

## 2025-08-27 ENCOUNTER — APPOINTMENT (OUTPATIENT)
Dept: ORTHOPEDIC SURGERY | Facility: CLINIC | Age: 75
End: 2025-08-27
Payer: COMMERCIAL

## 2025-08-27 VITALS — WEIGHT: 209 LBS | HEIGHT: 61 IN | BODY MASS INDEX: 39.46 KG/M2

## 2025-08-27 DIAGNOSIS — M17.11 UNILATERAL PRIMARY OSTEOARTHRITIS, RIGHT KNEE: ICD-10-CM

## 2025-08-27 PROCEDURE — J3490M: CUSTOM

## 2025-08-27 PROCEDURE — 99204 OFFICE O/P NEW MOD 45 MIN: CPT | Mod: 25

## 2025-08-27 PROCEDURE — 20611 DRAIN/INJ JOINT/BURSA W/US: CPT | Mod: RT

## 2025-08-27 PROCEDURE — 73564 X-RAY EXAM KNEE 4 OR MORE: CPT | Mod: RT

## 2025-08-27 RX ORDER — CELECOXIB 200 MG/1
200 CAPSULE ORAL
Qty: 30 | Refills: 0 | Status: ACTIVE | COMMUNITY
Start: 2025-08-27 | End: 1900-01-01

## (undated) DEVICE — GOWN LG

## (undated) DEVICE — ELCTR BOVIE TIP BLADE INSULATED 4" EDGE

## (undated) DEVICE — CAM-ESU 1501228: Type: DURABLE MEDICAL EQUIPMENT

## (undated) DEVICE — LAP PAD W RING 18 X 18"

## (undated) DEVICE — SUT MONOCRYL 4-0 27" PS-2 UNDYED

## (undated) DEVICE — RADIOGRAPHY DVC SPEC TRANSPEC

## (undated) DEVICE — ELCTR ROCKER SWITCH PENCIL BLUE 10FT

## (undated) DEVICE — DRAPE LAPAROTOMY TRANSVERSE

## (undated) DEVICE — DRSG MASTISOL

## (undated) DEVICE — DRAPE TOWEL BLUE 17" X 24"

## (undated) DEVICE — POSITIONER FOAM EGG CRATE ULNAR 2PCS (PINK)

## (undated) DEVICE — DRSG BENZOIN 0.6CC

## (undated) DEVICE — SOL IRR POUR H2O 500ML

## (undated) DEVICE — DRSG TEGADERM 4X4.75"

## (undated) DEVICE — PACK MINOR NO DRAPE

## (undated) DEVICE — NDL HYPO SAFE 25G X 1" (ORANGE)

## (undated) DEVICE — BLADE PHOTON 7.5IN / 10.5IN

## (undated) DEVICE — PREP BETADINE KIT

## (undated) DEVICE — SUT VICRYL 2-0 27" SH UNDYED

## (undated) DEVICE — SOL IRR POUR NS 0.9% 500ML

## (undated) DEVICE — POSITIONER PATIENT SAFETY STRAP 3X60"

## (undated) DEVICE — GLV 6.5 PROTEXIS (WHITE)

## (undated) DEVICE — VENODYNE/SCD SLEEVE CALF MEDIUM

## (undated) DEVICE — SUT VICRYL 4-0 27" SH UNDYED

## (undated) DEVICE — DRSG STERISTRIPS 0.5 X 4"

## (undated) DEVICE — ELCTR GROUNDING PAD ADULT COVIDIEN

## (undated) DEVICE — SUT VICRYL 0 27" CT-1 UNDYED

## (undated) DEVICE — WARMING BLANKET LOWER ADULT